# Patient Record
Sex: FEMALE | Race: WHITE | Employment: OTHER | URBAN - METROPOLITAN AREA
[De-identification: names, ages, dates, MRNs, and addresses within clinical notes are randomized per-mention and may not be internally consistent; named-entity substitution may affect disease eponyms.]

---

## 2017-01-26 ENCOUNTER — HOSPITAL ENCOUNTER (OUTPATIENT)
Dept: LAB | Age: 67
Discharge: HOME | End: 2017-01-26

## 2017-01-26 PROCEDURE — 88305 TISSUE EXAM BY PATHOLOGIST: CPT | Performed by: INTERNAL MEDICINE

## 2017-02-01 ENCOUNTER — HOSPITAL ENCOUNTER (OUTPATIENT)
Dept: INFUSION THERAPY | Age: 67
Discharge: HOME | End: 2017-02-01
Payer: MEDICARE

## 2017-02-01 VITALS
RESPIRATION RATE: 18 BRPM | TEMPERATURE: 98.2 F | DIASTOLIC BLOOD PRESSURE: 74 MMHG | OXYGEN SATURATION: 98 % | HEART RATE: 65 BPM | SYSTOLIC BLOOD PRESSURE: 130 MMHG

## 2017-02-01 LAB
CALCIUM SERPL-MCNC: 9.3 MG/DL (ref 8.3–10.4)
CREAT SERPL-MCNC: 1.16 MG/DL (ref 0.6–1)

## 2017-02-01 PROCEDURE — 36415 COLL VENOUS BLD VENIPUNCTURE: CPT | Performed by: PHYSICIAN ASSISTANT

## 2017-02-01 PROCEDURE — 82310 ASSAY OF CALCIUM: CPT | Performed by: PHYSICIAN ASSISTANT

## 2017-02-01 PROCEDURE — 96372 THER/PROPH/DIAG INJ SC/IM: CPT

## 2017-02-01 PROCEDURE — 74011250636 HC RX REV CODE- 250/636: Performed by: PHYSICIAN ASSISTANT

## 2017-02-01 PROCEDURE — 82565 ASSAY OF CREATININE: CPT | Performed by: PHYSICIAN ASSISTANT

## 2017-02-01 RX ADMIN — DENOSUMAB 60 MG: 60 INJECTION SUBCUTANEOUS at 16:51

## 2017-02-01 NOTE — PROGRESS NOTES
Arrived to the [facility]. Prolia completed. Patient tolerated without problems.  Patient had Prolia previously at a different facility  Any issues or concerns during appointment: no  Patient aware of next infusion appointment on to be scheduled after PCP appointment   Discharged ambulatory

## 2017-02-09 ENCOUNTER — HOSPITAL ENCOUNTER (OUTPATIENT)
Dept: SURGERY | Age: 67
Discharge: HOME | End: 2017-02-09

## 2017-02-09 VITALS
BODY MASS INDEX: 22.71 KG/M2 | OXYGEN SATURATION: 95 % | HEIGHT: 64 IN | SYSTOLIC BLOOD PRESSURE: 134 MMHG | RESPIRATION RATE: 16 BRPM | DIASTOLIC BLOOD PRESSURE: 58 MMHG | HEART RATE: 65 BPM | TEMPERATURE: 97.8 F | WEIGHT: 133 LBS

## 2017-02-09 RX ORDER — LEVOMEFOLATE/ALGAL OIL 15-90.314
1 CAPSULE ORAL
COMMUNITY
End: 2017-04-11 | Stop reason: SDUPTHER

## 2017-02-09 RX ORDER — CLONAZEPAM 1 MG/1
1 TABLET ORAL
COMMUNITY
End: 2017-04-11 | Stop reason: SDUPTHER

## 2017-02-09 RX ORDER — CHOLECALCIFEROL TAB 125 MCG (5000 UNIT) 125 MCG
5000 TAB ORAL
COMMUNITY

## 2017-02-09 RX ORDER — CLONAZEPAM 1 MG/1
1 TABLET ORAL AS NEEDED
COMMUNITY
End: 2017-04-11 | Stop reason: ALTCHOICE

## 2017-02-09 RX ORDER — DEXLANSOPRAZOLE 60 MG/1
60 CAPSULE, DELAYED RELEASE ORAL
COMMUNITY
End: 2017-04-11

## 2017-02-09 RX ORDER — GLUCOSAMINE/CHONDR SU A SOD 750-600 MG
1 TABLET ORAL
COMMUNITY

## 2017-02-09 RX ORDER — ASPIRIN 81 MG/1
81 TABLET ORAL
COMMUNITY

## 2017-02-09 RX ORDER — VILAZODONE HYDROCHLORIDE 40 MG/1
40 TABLET ORAL
COMMUNITY
End: 2017-04-11 | Stop reason: SDUPTHER

## 2017-02-09 NOTE — PERIOP NOTES
Patient verified name, , and surgery as listed in [facility]. TYPE  CASE:1B  Orders per surgeon: New Orders required  Labs per surgeon:none  Labs per anesthesia protocol: none   EKG  :  NA      Patient provided with handouts including guide to surgery , transfusions, pain management and hand hygiene for the family and community. Pt verbalizes understanding of all pre-op instructions . Instructed that family must be present in building at all times. Hibiclens and instructions given per hospital policy. Instructed patient to continue  previous medications as prescribed prior to surgery and  to take the following medications the day of surgery according to anesthesia guidelines : Zyrtec,Synthroid,Viibryd,Dexilant       Original medication prescription bottles where not visualized during patient appointment. Medication profile updated and reviewed with patient. Continue all previous medications unless otherwise directed. Instructed patient to hold  the following medications prior to surgery: Wilhelmina Brittle D      Patient verbalized understanding of all instructions and provided all medical/health information to the best of their ability.

## 2017-02-09 NOTE — PERIOP NOTES
Spoke with Yazmin Doll in scheduling, informed that pt states her upcoming surg is on her Left achilles.

## 2017-02-16 ENCOUNTER — HOSPITAL ENCOUNTER (OUTPATIENT)
Age: 67
Setting detail: OUTPATIENT SURGERY
Discharge: HOME | End: 2017-02-16
Attending: ORTHOPAEDIC SURGERY | Admitting: ORTHOPAEDIC SURGERY
Payer: MEDICARE

## 2017-02-16 ENCOUNTER — ANESTHESIA EVENT (OUTPATIENT)
Dept: SURGERY | Age: 67
End: 2017-02-16
Payer: MEDICARE

## 2017-02-16 ENCOUNTER — ANESTHESIA (OUTPATIENT)
Dept: SURGERY | Age: 67
End: 2017-02-16
Payer: MEDICARE

## 2017-02-16 VITALS
HEART RATE: 62 BPM | TEMPERATURE: 97.8 F | SYSTOLIC BLOOD PRESSURE: 116 MMHG | DIASTOLIC BLOOD PRESSURE: 56 MMHG | OXYGEN SATURATION: 100 % | RESPIRATION RATE: 16 BRPM

## 2017-02-16 PROCEDURE — 74011000250 HC RX REV CODE- 250

## 2017-02-16 PROCEDURE — 77030002982 HC SUT POLYSRB J&J -A: Performed by: ORTHOPAEDIC SURGERY

## 2017-02-16 PROCEDURE — 77030025281 HC SPLNT ORTHGLS 1 BSNM -B: Performed by: ORTHOPAEDIC SURGERY

## 2017-02-16 PROCEDURE — 77030020753 HC CUF TRNQT 1BLA STRY -B: Performed by: ORTHOPAEDIC SURGERY

## 2017-02-16 PROCEDURE — 74011250636 HC RX REV CODE- 250/636: Performed by: ANESTHESIOLOGY

## 2017-02-16 PROCEDURE — 76060000033 HC ANESTHESIA 1 TO 1.5 HR: Performed by: ORTHOPAEDIC SURGERY

## 2017-02-16 PROCEDURE — 76942 ECHO GUIDE FOR BIOPSY: CPT | Performed by: ORTHOPAEDIC SURGERY

## 2017-02-16 PROCEDURE — 74011250636 HC RX REV CODE- 250/636

## 2017-02-16 PROCEDURE — 77030003602 HC NDL NRV BLK BBMI -B: Performed by: ANESTHESIOLOGY

## 2017-02-16 PROCEDURE — 76010000160 HC OR TIME 0.5 TO 1 HR INTENSV-TIER 1: Performed by: ORTHOPAEDIC SURGERY

## 2017-02-16 PROCEDURE — 76010010054 HC POST OP PAIN BLOCK: Performed by: ORTHOPAEDIC SURGERY

## 2017-02-16 PROCEDURE — 77030002933 HC SUT MCRYL J&J -A: Performed by: ORTHOPAEDIC SURGERY

## 2017-02-16 PROCEDURE — 77030020754 HC CUF TRNQT 2BLA STRY -B: Performed by: ORTHOPAEDIC SURGERY

## 2017-02-16 PROCEDURE — 77030002916 HC SUT ETHLN J&J -A: Performed by: ORTHOPAEDIC SURGERY

## 2017-02-16 PROCEDURE — 76210000020 HC REC RM PH II FIRST 0.5 HR: Performed by: ORTHOPAEDIC SURGERY

## 2017-02-16 PROCEDURE — 77030013921: Performed by: ORTHOPAEDIC SURGERY

## 2017-02-16 PROCEDURE — 77030011640 HC PAD GRND REM COVD -A: Performed by: ORTHOPAEDIC SURGERY

## 2017-02-16 PROCEDURE — C1713 ANCHOR/SCREW BN/BN,TIS/BN: HCPCS | Performed by: ORTHOPAEDIC SURGERY

## 2017-02-16 PROCEDURE — 77030013789 HC KT REP BIO TEND ARTH -C: Performed by: ORTHOPAEDIC SURGERY

## 2017-02-16 PROCEDURE — 76210000063 HC OR PH I REC FIRST 0.5 HR: Performed by: ORTHOPAEDIC SURGERY

## 2017-02-16 PROCEDURE — 77030019940 HC BLNKT HYPOTHRM STRY -B: Performed by: ANESTHESIOLOGY

## 2017-02-16 PROCEDURE — 74011250636 HC RX REV CODE- 250/636: Performed by: ORTHOPAEDIC SURGERY

## 2017-02-16 DEVICE — SCREW INTFR L15MM DIA6.25MM BIOCOMPOSITE FACILITATE IORT: Type: IMPLANTABLE DEVICE | Site: ACHILLES TENDON | Status: FUNCTIONAL

## 2017-02-16 DEVICE — IMPLANT SYS BIOCOMP ACHILLES SUTRBRIDGE: Type: IMPLANTABLE DEVICE | Site: ACHILLES TENDON | Status: FUNCTIONAL

## 2017-02-16 RX ORDER — FENTANYL CITRATE 50 UG/ML
100 INJECTION, SOLUTION INTRAMUSCULAR; INTRAVENOUS ONCE
Status: DISCONTINUED | OUTPATIENT
Start: 2017-02-16 | End: 2017-02-16 | Stop reason: HOSPADM

## 2017-02-16 RX ORDER — LIDOCAINE HYDROCHLORIDE 10 MG/ML
0.1 INJECTION INFILTRATION; PERINEURAL AS NEEDED
Status: DISCONTINUED | OUTPATIENT
Start: 2017-02-16 | End: 2017-02-16 | Stop reason: HOSPADM

## 2017-02-16 RX ORDER — LIDOCAINE HYDROCHLORIDE 20 MG/ML
INJECTION, SOLUTION EPIDURAL; INFILTRATION; INTRACAUDAL; PERINEURAL AS NEEDED
Status: DISCONTINUED | OUTPATIENT
Start: 2017-02-16 | End: 2017-02-16 | Stop reason: HOSPADM

## 2017-02-16 RX ORDER — SODIUM CHLORIDE, SODIUM LACTATE, POTASSIUM CHLORIDE, CALCIUM CHLORIDE 600; 310; 30; 20 MG/100ML; MG/100ML; MG/100ML; MG/100ML
100 INJECTION, SOLUTION INTRAVENOUS CONTINUOUS
Status: CANCELLED | OUTPATIENT
Start: 2017-02-16

## 2017-02-16 RX ORDER — NALOXONE HYDROCHLORIDE 0.4 MG/ML
0.1 INJECTION, SOLUTION INTRAMUSCULAR; INTRAVENOUS; SUBCUTANEOUS AS NEEDED
Status: CANCELLED | OUTPATIENT
Start: 2017-02-16

## 2017-02-16 RX ORDER — CEFAZOLIN SODIUM IN 0.9 % NACL 2 G/50 ML
2 INTRAVENOUS SOLUTION, PIGGYBACK (ML) INTRAVENOUS ONCE
Status: COMPLETED | OUTPATIENT
Start: 2017-02-16 | End: 2017-02-16

## 2017-02-16 RX ORDER — BUPIVACAINE HYDROCHLORIDE AND EPINEPHRINE 5; 5 MG/ML; UG/ML
INJECTION, SOLUTION EPIDURAL; INTRACAUDAL; PERINEURAL AS NEEDED
Status: DISCONTINUED | OUTPATIENT
Start: 2017-02-16 | End: 2017-02-16 | Stop reason: HOSPADM

## 2017-02-16 RX ORDER — ONDANSETRON 2 MG/ML
4 INJECTION INTRAMUSCULAR; INTRAVENOUS ONCE
Status: CANCELLED | OUTPATIENT
Start: 2017-02-16 | End: 2017-02-16

## 2017-02-16 RX ORDER — ROPIVACAINE HYDROCHLORIDE 5 MG/ML
INJECTION, SOLUTION EPIDURAL; INFILTRATION; PERINEURAL AS NEEDED
Status: DISCONTINUED | OUTPATIENT
Start: 2017-02-16 | End: 2017-02-16 | Stop reason: HOSPADM

## 2017-02-16 RX ORDER — OXYCODONE HYDROCHLORIDE 5 MG/1
10 TABLET ORAL
Status: CANCELLED | OUTPATIENT
Start: 2017-02-16

## 2017-02-16 RX ORDER — SODIUM CHLORIDE, SODIUM LACTATE, POTASSIUM CHLORIDE, CALCIUM CHLORIDE 600; 310; 30; 20 MG/100ML; MG/100ML; MG/100ML; MG/100ML
100 INJECTION, SOLUTION INTRAVENOUS CONTINUOUS
Status: DISCONTINUED | OUTPATIENT
Start: 2017-02-16 | End: 2017-02-16 | Stop reason: HOSPADM

## 2017-02-16 RX ORDER — HYDROMORPHONE HYDROCHLORIDE 2 MG/ML
0.5 INJECTION, SOLUTION INTRAMUSCULAR; INTRAVENOUS; SUBCUTANEOUS
Status: CANCELLED | OUTPATIENT
Start: 2017-02-16

## 2017-02-16 RX ORDER — RANITIDINE HCL 75 MG
75 TABLET ORAL DAILY
COMMUNITY
End: 2017-04-11 | Stop reason: DRUGHIGH

## 2017-02-16 RX ORDER — ALBUTEROL SULFATE 0.83 MG/ML
2.5 SOLUTION RESPIRATORY (INHALATION) AS NEEDED
Status: CANCELLED | OUTPATIENT
Start: 2017-02-16

## 2017-02-16 RX ORDER — PROPOFOL 10 MG/ML
INJECTION, EMULSION INTRAVENOUS AS NEEDED
Status: DISCONTINUED | OUTPATIENT
Start: 2017-02-16 | End: 2017-02-16 | Stop reason: HOSPADM

## 2017-02-16 RX ORDER — PROPOFOL 10 MG/ML
INJECTION, EMULSION INTRAVENOUS
Status: DISCONTINUED | OUTPATIENT
Start: 2017-02-16 | End: 2017-02-16 | Stop reason: HOSPADM

## 2017-02-16 RX ORDER — MIDAZOLAM HYDROCHLORIDE 1 MG/ML
2 INJECTION, SOLUTION INTRAMUSCULAR; INTRAVENOUS ONCE
Status: COMPLETED | OUTPATIENT
Start: 2017-02-16 | End: 2017-02-16

## 2017-02-16 RX ORDER — DIPHENHYDRAMINE HYDROCHLORIDE 50 MG/ML
12.5 INJECTION, SOLUTION INTRAMUSCULAR; INTRAVENOUS
Status: CANCELLED | OUTPATIENT
Start: 2017-02-16

## 2017-02-16 RX ORDER — ONDANSETRON 2 MG/ML
INJECTION INTRAMUSCULAR; INTRAVENOUS AS NEEDED
Status: DISCONTINUED | OUTPATIENT
Start: 2017-02-16 | End: 2017-02-16 | Stop reason: HOSPADM

## 2017-02-16 RX ORDER — MIDAZOLAM HYDROCHLORIDE 1 MG/ML
2 INJECTION, SOLUTION INTRAMUSCULAR; INTRAVENOUS
Status: DISCONTINUED | OUTPATIENT
Start: 2017-02-16 | End: 2017-02-16 | Stop reason: HOSPADM

## 2017-02-16 RX ADMIN — PROPOFOL 120 MCG/KG/MIN: 10 INJECTION, EMULSION INTRAVENOUS at 13:16

## 2017-02-16 RX ADMIN — LIDOCAINE HYDROCHLORIDE 40 MG: 20 INJECTION, SOLUTION EPIDURAL; INFILTRATION; INTRACAUDAL; PERINEURAL at 13:16

## 2017-02-16 RX ADMIN — CEFAZOLIN 2 G: 1 INJECTION, POWDER, FOR SOLUTION INTRAMUSCULAR; INTRAVENOUS; PARENTERAL at 13:12

## 2017-02-16 RX ADMIN — ONDANSETRON 4 MG: 2 INJECTION INTRAMUSCULAR; INTRAVENOUS at 13:59

## 2017-02-16 RX ADMIN — MIDAZOLAM HYDROCHLORIDE 2 MG: 1 INJECTION, SOLUTION INTRAMUSCULAR; INTRAVENOUS at 11:00

## 2017-02-16 RX ADMIN — SODIUM CHLORIDE, SODIUM LACTATE, POTASSIUM CHLORIDE, AND CALCIUM CHLORIDE 100 ML/HR: 600; 310; 30; 20 INJECTION, SOLUTION INTRAVENOUS at 11:00

## 2017-02-16 RX ADMIN — ROPIVACAINE HYDROCHLORIDE 15 ML: 5 INJECTION, SOLUTION EPIDURAL; INFILTRATION; PERINEURAL at 11:16

## 2017-02-16 RX ADMIN — BUPIVACAINE HYDROCHLORIDE AND EPINEPHRINE 30 ML: 5; 5 INJECTION, SOLUTION EPIDURAL; INTRACAUDAL; PERINEURAL at 11:16

## 2017-02-16 RX ADMIN — PROPOFOL 20 MG: 10 INJECTION, EMULSION INTRAVENOUS at 13:16

## 2017-02-16 NOTE — IP AVS SNAPSHOT
Jean Claude Clonts 
 
 
 [address] [facility] 
[phone] Patient: Chuck Tineo MRN: [id number] You are allergic to the following Allergen Reactions Morphine Other (comments) \"I almost checked out\"-Trendelenburg Nitrofurantoin Diarrhea And Nausea Oxycodone-Acetaminophen Diarrhea Propranolol Other (comments) \"Didn't sleep for 2 days and  I saw green splotches on 2 different days \"  
    
 Sulfa (Sulfonamide Antibiotics) Other (comments) As a child/ unknown reactions Recent Documentation OB Status Smoking Status Hysterectomy Never Smoker Emergency Contacts Name Discharge Info Relation Home Work Mobile [address] CAREGIVER [3] Spouse [3] [phone] About your hospitalization You were admitted on:  February 16, 2017 [facility] You were discharged on:  February 16, 2017 Unit phone number:  [phone] Why you were hospitalized Your primary diagnosis was:  Not on File Providers Seen During Your Hospitalizations Provider Role Specialty Primary office phone Catie Barnes MD Attending Provider Orthopedic Surgery [phone] Your Primary Care Physician (PCP) Primary Care Physician Office Phone Office Fax Kareem Scott [phone] Follow-up Information None Your Appointments Monday March 27, 2017  4:10 PM EDT  
DEXA BONE DENSITY STDY AXIAL with SFE DEXA BI GE LUNAR DEXA [address] [facility] ([address] [place] [address]) [address] [address]  
[phone]  MEDICATIONS -  Patient must bring a complete list of all medications currently taking to include prescriptions, over-the-counter meds, herbals, vitamins & any dietary supplements -  Patient must discontinue use of calcium, vitamins, or calcium supplements including antacids (calcium based) 24 hours before scan. GENERAL INSTRUCTIONS - Men should wear a jogging suit with NO metal.  Women should wear a sports bra with NO metal as well as clothes with no metal or buttons. PATIENT ARRIVAL -  Please report 15 minutes early to check in  
  
    
 Monday March 27, 2017  4:45 PM EDT  
[facility] MAMMO SCREENING with SFE [facility] BI ROOM 1 [address] [place] [address] Breast Health ([facility] [address]) [address] [place] [address]  
[phone] ***** NOTE: Appointments for the Mobile Mammography UNIT CANNOT be made on My Chart *****  PATIENT ARRIVAL - Please report 30 minutes early to check in. GENERAL INSTRUCTIONS -  On the day of your exam do not use any bath powder, deodorant or lotions on the chest or armpit area. Wear two-piece outfit for ease of changing. Allow at least 1 hour for test.  
  
    
  
  
 
  
  
  
Current Discharge Medication List  
  
ASK your doctor about these medications Dose & Instructions Dispensing Information Comments Morning Noon Evening Bedtime  
 aspirin delayed-release 81 mg tablet Your next dose is: Today, Tomorrow Other:  _________ Dose:  81 mg Take 81 mg by mouth nightly. Refills:  0 Biotin 2,500 mcg Cap Your next dose is: Today, Tomorrow Other:  _________ Dose:  1 Tab Take 1 Tab by mouth as needed. Refills:  0  
     
   
   
   
  
 CALCIUM 600 + D 600-125 mg-unit Tab Generic drug:  calcium-cholecalciferol (d3) Your next dose is: Today, Tomorrow Other:  _________ Dose:  1 Tab Take 1 Tab by mouth two (2) times a day. Refills:  0 CENTRUM SILVER PO Your next dose is: Today, Tomorrow Other:  _________ Dose:  1 Tab Take 1 Tab by mouth every morning. Refills:  0  
     
   
   
   
  
 cholecalciferol (VITAMIN D3) 5,000 unit Tab tablet Commonly known as:  VITAMIN D3 Your next dose is: Today, Tomorrow Other:  _________ Dose:  5000 Units Take 5,000 Units by mouth every Monday, Wednesday, Friday. Refills:  0  
     
   
   
   
  
 clotrimazole-betamethasone topical cream  
Commonly known as:  Tucker Scriver Your next dose is: Today, Tomorrow Other:  _________ Apply to affected area bid Quantity:  15 g Refills:  0  
     
   
   
   
  
 denosumab 60 mg/mL injection Commonly known as:  Elaina Vishal Your next dose is: Today, Tomorrow Other:  _________ Dose:  60 mg  
1 mL by SubCUTAneous route every 6 months. Quantity:  1 Syringe Refills:  1 DEPLIN (ALGAL OIL) 15-90.314 mg Cap Generic drug:  levomefolate-algal oil Your next dose is: Today, Tomorrow Other:  _________ Dose:  1 Cap Take 1 Cap by mouth every morning. Refills:  0 DEXILANT 60 mg Cpdb Generic drug:  Dexlansoprazole Your next dose is: Today, Tomorrow Other:  _________ Dose:  60 mg Take 60 mg by mouth every morning. Refills:  0  
     
   
   
   
  
 * KlonoPIN 1 mg tablet Generic drug:  clonazePAM  
   
Your next dose is: Today, Tomorrow Other:  _________ Dose:  1 mg Take 1 mg by mouth as needed. Refills:  0  
     
   
   
   
  
 * KlonoPIN 1 mg tablet Generic drug:  clonazePAM  
   
Your next dose is: Today, Tomorrow Other:  _________ Dose:  1 mg Take 1 mg by mouth nightly. Refills:  0 KRILL OIL PO Your next dose is: Today, Tomorrow Other:  _________ Dose:  1 Tab Take 1 Tab by mouth daily (with lunch). Refills:  0  
     
   
   
   
  
 synthroid 50 mcg tablet Generic drug:  levothyroxine Your next dose is: Today, Tomorrow Other:  _________ Dose:  50 mcg Take 1 Tab by mouth Daily (before breakfast). Brand medically necessary. Quantity:  30 Tab Refills:  5 VIIBRYD 40 mg Tab tablet Generic drug:  vilazodone Your next dose is: Today, Tomorrow Other:  _________ Dose:  40 mg Take 40 mg by mouth every morning. Refills:  0  
     
   
   
   
  
 ZANTAC 75 75 mg tablet Generic drug:  raNITIdine Your next dose is: Today, Tomorrow Other:  _________ Dose:  75 mg Take 75 mg by mouth daily. Refills:  0 ZyrTEC 10 mg Cap Generic drug:  Cetirizine Your next dose is: Today, Tomorrow Other:  _________ Dose:  1 Cap Take 1 Cap by mouth every morning. Refills:  0  
     
   
   
   
  
 * Notice: This list has 2 medication(s) that are the same as other medications prescribed for you. Read the directions carefully, and ask your doctor or other care provider to review them with you. Discharge Instructions INSTRUCTIONS FOLLOWING FOOT SURGERY 
 
ACTIVITY Elevate foot. No Ice Get up and out of bed frequently. Move the legs as much as possible while in bed. No weight bearing. Use crutches or knee walker until seen in follow up appointment Take one 325mg aspirin daily if okay with your medical doctor and you have no GI ulcer. Get up and out of bed frequently. While in bed move the legs as much as possible) DIET Clear liquids until no nausea or vomiting; then light diet for the first day. Advance to regular diet on second day, unless your doctor orders otherwise. PAIN Take pain medications as directed by your doctor. Call your doctor if pain is NOT relieved by medication. DRESSING CARE Keep dry and in place until follow up appointment CALL YOUR DOCTOR IF YOU HAVE 
 Excessive bleeding that does not stop after holding mild pressure over the area. Temperature of 101 degrees or above. Redness, excessive swelling or bruising, and/or green or yellow, smelly discharge from incision. Loss of sensation - cold, white or blue toes. AFTER ANESTHESIA For the first 24 hours and while taking narcotics for pain: DO NOT Drive, Drink Alcoholic beverages, or make important Decisions. Be aware of dizziness following anesthesia and while taking pain medication. Preventing Infection at Home We care about preventing infection and avoiding the spread of germs  not only when you are in the hospital but also when you return home. When you return home from the hospital, its important to take the following steps to help prevent infection and avoid spreading germs that could infect you and others. Ask everyone in your home to follow these guidelines, too. Clean Your Hands · Clean your hands whenever your hands are visibly dirty, before you eat, before or after touching your mouth, nose or eyes, and before preparing food. Clean them after contact with body fluids, using the restroom, touching animals or changing diapers. · When washing hands, wet them with warm water and work up a lather. Rub hands for at least 15 seconds, then rinse them and pat them dry with a clean towel or paper towel. · When using hand sanitizers, it should take about 15 seconds to rub your hands dry. If not, you probably didnt apply enough . Cover Your Sneeze or Cough Germs are released into the air whenever you sneeze or cough. To prevent the spread of infection: · Turn away from other people before coughing or sneezing. · Cover your mouth or nose with a tissue when you cough or sneeze. Put the tissue in the trash. · If you dont have a tissue, cough or sneeze into your upper sleeve, not your hands. · Always clean your hands after coughing or sneezing. Care for Wounds Your skin is your bodys first line of defense against germs, but an open wound leaves an easy way for germs to enter your body. To prevent infection: · Clean your hands before and after changing wound dressings, and wear gloves to change dressings if recommended by your doctor. · Take special care with IV lines or other devices inserted into the body. If you must touch them, clean your hands first. 
· Follow any specific instructions from your doctor to care for your wounds. Contact your doctor if you experience any signs of infection, such as fever or increased redness at the surgical or wound site. Keep a [address] [address] · Clean or wipe commonly touched hard surfaces like door handles, sinks, tabletops, phones and TV remotes. · Use products labeled disinfectant to kill harmful bacteria and viruses. · Use a clean cloth or paper towel to clean and dry surfaces. Wiping surfaces with a dirty dishcloth, sponge or towel will only spread germs. · Never share toothbrushes, stern, drinking glasses, utensils, razor blades, face cloths or bath towels to avoid spreading germs. · Be sure that the linens that you sleep on are clean. · Keep pets away from wounds and wash your hands after touching pets, their toys or bedding. We care about you and your health. Remember, preventing infections is a team effort between you, your family, friends and health care providers. APPOINTMENT DATE/TIME Keep as scheduled YOUR DOCTOR'S PHONE NUMBER [phone] DISCHARGE SUMMARY from Nurse PATIENT INSTRUCTIONS: 
 
After general anesthesia or intravenous sedation, for 24 hours or while taking prescription Narcotics: · Limit your activities · Do not drive and operate hazardous machinery · Do not make important personal or business decisions · Do  not drink alcoholic beverages · If you have not urinated within 8 hours after discharge, please contact your surgeon on call. *  Please give a list of your current medications to your Primary Care Provider. *  Please update this list whenever your medications are discontinued, doses are 
    changed, or new medications (including over-the-counter products) are added. *  Please carry medication information at all times in case of emergency situations. These are general instructions for a healthy lifestyle: No smoking/ No tobacco products/ Avoid exposure to second hand smoke Surgeon General's Warning:  Quitting smoking now greatly reduces serious risk to your health. Obesity, smoking, and sedentary lifestyle greatly increases your risk for illness A healthy diet, regular physical exercise & weight monitoring are important for maintaining a healthy lifestyle You may be retaining fluid if you have a history of heart failure or if you experience any of the following symptoms:  Weight gain of 3 pounds or more overnight or 5 pounds in a week, increased swelling in our hands or feet or shortness of breath while lying flat in bed. Please call your doctor as soon as you notice any of these symptoms; do not wait until your next office visit. Recognize signs and symptoms of STROKE: 
 
F-face looks uneven A-arms unable to move or move unevenly S-speech slurred or non-existent T-time-call 911 as soon as signs and symptoms begin-DO NOT go Back to bed or wait to see if you get better-TIME IS BRAIN. Discharge Orders None Introducing [facility]! Yanira Mcgowan introduces [employer] patient portal. Now you can access parts of your medical record, email your doctor's office, and request medication refills online. 1. In your internet browser, go to [email or web address] 2. Click on the First Time User? Click Here link in the Sign In box. You will see the New Member Sign Up page. 3. Enter your [employer] Access Code exactly as it appears below.  You will not need to use this code after youve completed the sign-up process. If you do not sign up before the expiration date, you must request a new code. · [employer] Access Code: [email or web address] Expires: 5/7/2017 10:16 AM 
 
4. Enter the last four digits of your Social Security Number (xxxx) and Date of Birth (mm/dd/yyyy) as indicated and click Submit. You will be taken to the next sign-up page. 5. Create a [employer] ID. This will be your [employer] login ID and cannot be changed, so think of one that is secure and easy to remember. 6. Create a [employer] password. You can change your password at any time. 7. Enter your Password Reset Question and Answer. This can be used at a later time if you forget your password. 8. Enter your e-mail address. You will receive e-mail notification when new information is available in [address] 9. Click Sign Up. You can now view and download portions of your medical record. 10. Click the Download Summary menu link to download a portable copy of your medical information. If you have questions, please visit the Frequently Asked Questions section of the [employer] website. Remember, [employer] is NOT to be used for urgent needs. For medical emergencies, dial 911. Now available from your iPhone and Android! General Information Please provide this summary of care documentation to your next provider. Patient Signature:  ____________________________________________________________ Date:  ____________________________________________________________  
  
Samson Chin Provider Signature:  ____________________________________________________________ Date:  ____________________________________________________________

## 2017-02-16 NOTE — ANESTHESIA POSTPROCEDURE EVALUATION
Post-Anesthesia Evaluation and Assessment    Patient: Mich Raya MRN: [id number]  SSN: [id number]    YOB: 1950  Age: 69 y.o. Sex: female       Cardiovascular Function/Vital Signs  Visit Vitals    /49    Pulse 87    Temp 36.6 °C (97.8 °F)    Resp 12    SpO2 97%       Patient is status post total IV anesthesia anesthesia for Procedure(s):  LEFT  ACHILLES TENDON REPAIR /  FHL TRANSFER / Lavanda People / GASTROC RECESSION . Nausea/Vomiting: None    Postoperative hydration reviewed and adequate. Pain:  Pain Scale 1: (P) Numeric (0 - 10) (02/16/17 1412)  Pain Intensity 1: (P) 0 (02/16/17 1412)   Managed    Neurological Status:   Neuro (WDL): (P) Exceptions to WDL (02/16/17 1412)  Neuro  Neurologic State: (P) Drowsy (02/16/17 1412)  LUE Motor Response: (P) Purposeful (02/16/17 1412)  LLE Motor Response: (P) Pharmacologically paralyzed;Numbness (02/16/17 1412)  RUE Motor Response: (P) Purposeful (02/16/17 1412)  RLE Motor Response: (P) Purposeful (02/16/17 1412)   At baseline    Mental Status and Level of Consciousness: Arousable    Pulmonary Status:   O2 Device: Nasal cannula (02/16/17 1412)   Adequate oxygenation and airway patent    Complications related to anesthesia: None    Post-anesthesia assessment completed.  No concerns    Signed By: Raymundo Gunderson MD     February 16, 2017

## 2017-02-16 NOTE — DISCHARGE INSTRUCTIONS
INSTRUCTIONS FOLLOWING FOOT SURGERY    ACTIVITY  Elevate foot. No Ice  Get up and out of bed frequently. Move the legs as much as possible while in bed. No weight bearing. Use crutches or knee walker until seen in follow up appointment  Take one 325mg aspirin daily if okay with your medical doctor and you have no GI ulcer. Get up and out of bed frequently. While in bed move the legs as much as possible)    DIET  Clear liquids until no nausea or vomiting; then light diet for the first day. Advance to regular diet on second day, unless your doctor orders otherwise. PAIN  Take pain medications as directed by your doctor. Call your doctor if pain is NOT relieved by medication. DRESSING CARE Keep dry and in place until follow up appointment    CALL YOUR DOCTOR IF YOU HAVE  Excessive bleeding that does not stop after holding mild pressure over the area. Temperature of 101 degrees or above. Redness, excessive swelling or bruising, and/or green or yellow, smelly discharge from incision. Loss of sensation - cold, white or blue toes. AFTER ANESTHESIA  For the first 24 hours and while taking narcotics for pain: DO NOT Drive, Drink Alcoholic beverages, or make important Decisions. Be aware of dizziness following anesthesia and while taking pain medication. Preventing Infection at Home  We care about preventing infection and avoiding the spread of germs - not only when you are in the hospital but also when you return home. When you return home from the hospital, its important to take the following steps to help prevent infection and avoid spreading germs that could infect you and others. Ask everyone in your home to follow these guidelines, too. Clean Your Hands  · Clean your hands whenever your hands are visibly dirty, before you eat, before or after touching your mouth, nose or eyes, and before preparing food.  Clean them after contact with body fluids, using the restroom, touching animals or changing diapers. · When washing hands, wet them with warm water and work up a lather. Rub hands for at least 15 seconds, then rinse them and pat them dry with a clean towel or paper towel. · When using hand sanitizers, it should take about 15 seconds to rub your hands dry. If not, you probably didnt apply enough . Cover Your Sneeze or Cough  Germs are released into the air whenever you sneeze or cough. To prevent the spread of infection:  · Turn away from other people before coughing or sneezing. · Cover your mouth or nose with a tissue when you cough or sneeze. Put the tissue in the trash. · If you dont have a tissue, cough or sneeze into your upper sleeve, not your hands. · Always clean your hands after coughing or sneezing. Care for Wounds  Your skin is your bodys first line of defense against germs, but an open wound leaves an easy way for germs to enter your body. To prevent infection:  · Clean your hands before and after changing wound dressings, and wear gloves to change dressings if recommended by your doctor. · Take special care with IV lines or other devices inserted into the body. If you must touch them, clean your hands first.  · Follow any specific instructions from your doctor to care for your wounds. Contact your doctor if you experience any signs of infection, such as fever or increased redness at the surgical or wound site. Keep a Clean Home  · Clean or wipe commonly touched hard surfaces like door handles, sinks, tabletops, phones and TV remotes. · Use products labeled disinfectant to kill harmful bacteria and viruses. · Use a clean cloth or paper towel to clean and dry surfaces. Wiping surfaces with a dirty dishcloth, sponge or towel will only spread germs. · Never share toothbrushes, stern, drinking glasses, utensils, razor blades, face cloths or bath towels to avoid spreading germs. · Be sure that the linens that you sleep on are clean.   · Keep pets away from wounds and wash your hands after touching pets, their toys or bedding. We care about you and your health. Remember, preventing infections is a team effort between you, your family, friends and health care providers. APPOINTMENT DATE/TIME Keep as scheduled    YOUR DOCTOR'S PHONE NUMBER [phone]      DISCHARGE SUMMARY from Nurse    PATIENT INSTRUCTIONS:    After general anesthesia or intravenous sedation, for 24 hours or while taking prescription Narcotics:  · Limit your activities  · Do not drive and operate hazardous machinery  · Do not make important personal or business decisions  · Do  not drink alcoholic beverages  · If you have not urinated within 8 hours after discharge, please contact your surgeon on call. *  Please give a list of your current medications to your Primary Care Provider. *  Please update this list whenever your medications are discontinued, doses are      changed, or new medications (including over-the-counter products) are added. *  Please carry medication information at all times in case of emergency situations. These are general instructions for a healthy lifestyle:    No smoking/ No tobacco products/ Avoid exposure to second hand smoke    Surgeon General's Warning:  Quitting smoking now greatly reduces serious risk to your health. Obesity, smoking, and sedentary lifestyle greatly increases your risk for illness    A healthy diet, regular physical exercise & weight monitoring are important for maintaining a healthy lifestyle    You may be retaining fluid if you have a history of heart failure or if you experience any of the following symptoms:  Weight gain of 3 pounds or more overnight or 5 pounds in a week, increased swelling in our hands or feet or shortness of breath while lying flat in bed. Please call your doctor as soon as you notice any of these symptoms; do not wait until your next office visit.     Recognize signs and symptoms of STROKE:    F-face looks uneven    A-arms unable to move or move unevenly    S-speech slurred or non-existent    T-time-call 911 as soon as signs and symptoms begin-DO NOT go       Back to bed or wait to see if you get better-TIME IS BRAIN.

## 2017-02-16 NOTE — ANESTHESIA PROCEDURE NOTES
Peripheral Block    Start time: 2/16/2017 11:15 AM  End time: 2/16/2017 11:16 AM  Performed by: Deejay Mcclendon  Authorized by: Deejay Mcclendon       Pre-procedure: Indications: at surgeon's request and post-op pain management    Preanesthetic Checklist: patient identified, risks and benefits discussed, site marked, timeout performed, anesthesia consent given and patient being monitored    Timeout Time: 11:15          Block Type:   Block Type:   Adductor canal  Laterality:  Left  Monitoring:  Standard ASA monitoring, continuous pulse ox, frequent vital sign checks, heart rate, oxygen and responsive to questions  Injection Technique:  Single shot  Procedures: ultrasound guided    Patient Position: supine  Prep: chlorhexidine    Location:  Mid thigh  Needle Type:  Stimuplex  Needle Gauge:  21 G  Needle Localization:  Ultrasound guidance and anatomical landmarks  Medication Injected:  0.5%  ropivacaine  Volume (mL):  15    Assessment:  Number of attempts:  1  Injection Assessment:  Incremental injection every 5 mL, local visualized surrounding nerve on ultrasound, negative aspiration for blood, no paresthesia, no intravascular symptoms and ultrasound image on chart  Patient tolerance:  Patient tolerated the procedure well with no immediate complications

## 2017-02-16 NOTE — ANESTHESIA PROCEDURE NOTES
Peripheral Block    Start time: 2/16/2017 11:12 AM  End time: 2/16/2017 11:15 AM  Performed by: Cammie Davila  Authorized by: Cammie Davila       Pre-procedure:    Indications: at surgeon's request and post-op pain management    Preanesthetic Checklist: patient identified, risks and benefits discussed, site marked, timeout performed, anesthesia consent given and patient being monitored    Timeout Time: 11:12          Block Type:   Block Type:  Popliteal  Laterality:  Left  Monitoring:  Standard ASA monitoring, continuous pulse ox, frequent vital sign checks, heart rate, oxygen and responsive to questions  Injection Technique:  Single shot  Procedures: ultrasound guided    Prep: chlorhexidine    Needle Type:  Stimuplex  Needle Gauge:  21 G  Needle Localization:  Ultrasound guidance and anatomical landmarks  Medication Injected:  0.5%  bupivacaine  Adds:  Epi 1:200K  Volume (mL):  30    Assessment:  Number of attempts:  1  Injection Assessment:  Incremental injection every 5 mL, local visualized surrounding nerve on ultrasound, negative aspiration for blood, no paresthesia, no intravascular symptoms and ultrasound image on chart  Patient tolerance:  Patient tolerated the procedure well with no immediate complications

## 2017-02-16 NOTE — BRIEF OP NOTE
BRIEF OPERATIVE NOTE    Date of Procedure: 2/16/2017   Preoperative Diagnosis: Achilles bursitis of left lower extremity [M76.62]  Contracture of ankle, left [M24.572]  Postoperative Diagnosis: Achilles bursitis of left lower extremity [M76.62]  Contracture of ankle, left [M24.572]    Procedure(s):  LEFT  ACHILLES TENDON REPAIR /  FHL TRANSFER / Joshua El / GASTROC RECESSION   Surgeon(s) and Role:     * Alyssa Story MD - Primary            Surgical Staff:  Circ-1: Dottie Stewart RN  Scrub Tech-1: Zaina He  Scrub Tech-2: Klarissa Leonardo  Event Time In   Incision Start 1331   Incision Close 1400     Anesthesia: General   Estimated Blood Loss: min  Specimens: * No specimens in log *   Findings: no  Complications: no  Implants:   Implant Name Type Inv.  Item Serial No.  Lot No. LRB No. Used Action   ANCHOR SUT ACHILLES SUTBRIDGE -- AR[employer]RE[employer] - [id number]  ANCHOR SUT ACHILLES SUTBRIDGE -- Lenoria Latin [id number] Left 1 Implanted   SCR INTFR BIOTENDSIS [id number] --  - [id number]   SCR INTFR BIOTENDSIS [id number] --    ARTHREX [id number] 1 Implanted

## 2017-02-16 NOTE — ANESTHESIA PREPROCEDURE EVALUATION
Anesthetic History     PONV          Review of Systems / Medical History  Patient summary reviewed, nursing notes reviewed and pertinent labs reviewed    Pulmonary  Within defined limits                 Neuro/Psych   Within defined limits           Cardiovascular                  Exercise tolerance: >4 METS     GI/Hepatic/Renal     GERD: well controlled           Endo/Other      Hypothyroidism: well controlled       Other Findings              Physical Exam    Airway  Mallampati: II  TM Distance: 4 - 6 cm  Neck ROM: normal range of motion   Mouth opening: Normal     Cardiovascular    Rhythm: regular  Rate: normal         Dental  No notable dental hx       Pulmonary  Breath sounds clear to auscultation               Abdominal         Other Findings            Anesthetic Plan    ASA: 2  Anesthesia type: total IV anesthesia      Post-op pain plan if not by surgeon: peripheral nerve block single    Induction: Intravenous  Anesthetic plan and risks discussed with: Patient and Spouse

## 2017-02-17 NOTE — OP NOTES
[facility]   OPERATIVE REPORT       Name:  Jazz Nash   MR#:  [id number]   :  1950   Account #:  [de-identified]   Date of Adm:  2017       DATE OF PROCEDURE: 2017     PREOPERATIVE DIAGNOSIS: Left insertional Achilles tendinitis. POSTOPERATIVE DIAGNOSIS: Left insertional Achilles tendinitis. PROCEDURE PERFORMED   1. Left secondary repair of Achilles with debridement. 2. Left flexor hallucis longus tendon transfer. 3. Left gastrocnemius recession. 4. Left Kandice deformity excision of calcaneus. SURGEON: Lily Rivas MD    ANESTHESIA: Popliteal block with monitored anesthesia care. ESTIMATED BLOOD LOSS: Minimal.    TOURNIQUET TIME: Twenty-eight minutes at 250 mmHg. ANTIBIOTIC PROPHYLAXIS: Ancef given prior to procedure. INDICATIONS FOR PROCEDURE: Ms. Roly Bustamante is a 49-year-old, white   female with symptomatic left insertional Achilles tendinitis who   has failed conservative therapy and desires surgical treatment. Risks and benefits of procedure including but not limited to   risk of anesthetic complications including myocardial   infarction, stroke, and death, as well as surgical complications   including damage to nerves and blood vessels, risk of infection,   incomplete pain relief, need for additional surgery have been   discussed at length with the patient. She understands the risks   and wishes to proceed with surgery at this time. DETAILS OF PROCEDURE: The patient's operative site was marked   with indelible ink in the preoperative holding area. A block was   placed by the Department of Anesthesia. The patient was brought   to the operating table and placed prone on well-padded chest   rolls. During a preop surgical timeout, the left lower extremity   was identified as the correct surgical site, prepped and draped   in a standard sterile fashion using ChloraPrep solution.  A   gastrocnemius recession was performed through a posterior approach to the Achilles, taking care to protect the sural   nerve, and then repaired using Monocryl and nylon sutures. A   posterior approach to the heel was then performed at that time. A central splitting approach to the Achilles was performed. Approximately 50% to 60% of the distal Achilles was debrided due   to tendinosis. Kandice deformity of the calcaneus was excised   using an osteotome and a power rasp. The deep fascia of the leg   was then incised. The FHL tendon was identified in the fiber   osseous tunnel and harvested, taking care to protect the   neurovascular bundles, brought down a drill hole in the   calcaneus and secured using an interference screw. An Arthrex   SutureBridge was used to reattach the Achilles to the calcaneus   without difficulty. The wound was then irrigated and closed   using Vicryl in the tendon, followed by Monocryl and nylon   sutures on the skin. A sterile dressing was then applied,   followed by well-padded posterior splint. Anesthesia was   discontinued. The patient was subsequently transferred back to   recovery room bed, taken to the recovery room in satisfactory   condition. She appeared to tolerate the procedure well. There   were no apparent surgical or anesthetic complications. All   needle and sponge counts were correct.         MD WINNIE Copeland Caro / ALIA   D:  02/16/2017   19:03   T:  02/17/2017   09:58   Job #:  [id number]

## 2017-03-27 ENCOUNTER — HOSPITAL ENCOUNTER (OUTPATIENT)
Dept: MAMMOGRAPHY | Age: 67
Discharge: HOME | End: 2017-03-27
Attending: PHYSICIAN ASSISTANT
Payer: MEDICARE

## 2017-03-27 DIAGNOSIS — M89.9 BONE DISORDER: ICD-10-CM

## 2017-03-27 DIAGNOSIS — Z12.31 ENCOUNTER FOR SCREENING MAMMOGRAM FOR BREAST CANCER: ICD-10-CM

## 2017-03-27 DIAGNOSIS — M85.80 OSTEOPENIA: ICD-10-CM

## 2017-03-27 PROCEDURE — 77080 DXA BONE DENSITY AXIAL: CPT

## 2017-03-27 PROCEDURE — 77067 SCR MAMMO BI INCL CAD: CPT

## 2017-03-28 NOTE — PROGRESS NOTES
Bone density results show persistent osteopenia but improving bone density in all areas so Prolia appears to be doing well for her. Continue these injections every 6 months and plan to repeat this test in 2 years.

## 2017-03-29 ENCOUNTER — HOSPITAL ENCOUNTER (OUTPATIENT)
Dept: PHYSICAL THERAPY | Age: 67
Discharge: HOME | End: 2017-03-29
Payer: MEDICARE

## 2017-03-29 PROCEDURE — 97162 PT EVAL MOD COMPLEX 30 MIN: CPT

## 2017-03-29 PROCEDURE — G8979 MOBILITY GOAL STATUS: HCPCS

## 2017-03-29 PROCEDURE — G8978 MOBILITY CURRENT STATUS: HCPCS

## 2017-03-29 PROCEDURE — 97110 THERAPEUTIC EXERCISES: CPT

## 2017-03-29 NOTE — PROGRESS NOTES
Ambulatory/Rehab Services H2 Model Falls Risk Assessment    Risk Factor Pts. ·   Confusion/Disorientation/Impulsivity  []    4 ·   Symptomatic Depression  []   2 ·   Altered Elimination  []   1 ·   Dizziness/Vertigo  []   1 ·   Gender (Male)  []   1 ·   Any administered antiepileptics (anticonvulsants):  []   2 ·   Any administered benzodiazepines:  []   1 ·   Visual Impairment (specify):  []   1 ·   Portable Oxygen Use  []   1 ·   Orthostatic ? BP  []   1 ·   History of Recent Falls (within 3 mos.)  []   5     Ability to Rise from Chair (choose one) Pts. ·   Ability to rise in a single movement  []   0 ·   Pushes up, successful in one attempt  [x]   1 ·   Multiple attempts, but successful  []   3 ·   Unable to rise without assistance  []   4   Total: (5 or greater = High Risk) 1     Falls Prevention Plan:   []                Physical Limitations to Exercise (specify):   []                Mobility Assistance Device (type):   []                Exercise/Equipment Adaptation (specify):    ©2010 [facility] [address] [place] [address] Patent #[id number].  Federal Law prohibits the replication, distribution or use without written permission from [facility] [employer]

## 2017-03-29 NOTE — PROGRESS NOTES
Timru Hodgson  : 1950 [facility]  [address], [address] [address],[address], [address] [facility]  [phone]   [phone]            OUTPATIENT PHYSICAL THERAPY:Initial Assessment 3/29/2017    ICD-10: Treatment Diagnosis: Difficulty in walking, not elsewhere classified (R26.2)  Precautions/Allergies:   Morphine; Nitrofurantoin; Oxycodone-acetaminophen; Propranolol; and Sulfa (sulfonamide antibiotics)   Fall Risk Score: 1 (? 5 = High Risk)  MD Orders: eval and treat MEDICAL/REFERRING DIAGNOSIS:  Achilles tendinitis, left leg [M76.62]  Contracture, left ankle [M24.572]   DATE OF ONSET: 3-16-17  REFERRING PHYSICIAN: Herve Ledesma MD  RETURN PHYSICIAN APPOINTMENT: unsure date     INITIAL ASSESSMENT:  Ms. Jacqui Navarrete presents s/p LEFT ACHILLES TENDON REPAIR / Vincenzo Tinsley / Kirsten Haines / Sunny Rondon . she will benefit from skilled PT to assist with pain control and ease of function. PROBLEM LIST (Impacting functional limitations):  1. Decreased Strength  2. Decreased ADL/Functional Activities  3. Decreased Balance  4. Increased Pain  5. Decreased Flexibility/Joint Mobility  6. Decreased Dickenson with Home Exercise Program INTERVENTIONS PLANNED:  1. Cold  2. Cryotherapy  3. Electrical Stimulation  4. Gait Training  5. Heat  6. Home Exercise Program (HEP)  7. Manual Therapy  8. Range of Motion (ROM)  9. Therapeutic Activites  10. Therapeutic Exercise/Strengthening   TREATMENT PLAN:  Effective Dates: 3-29-17 TO 17. Frequency/Duration: 2 times a week for 8 weeks  GOALS: (Goals have been discussed and agreed upon with patient.)  Short-Term Functional Goals: Time Frame: 4 weeks  1. Pt will be I with phase appropriate HEP to assist with ROM and strength foot. 2. Pt will improve dorsiflexion to 15 degrees to improve length of Achilles involved foot. Discharge Goals: Time Frame: 8 weeks  1.  Pt will improve strength involved foot to demonstrate 10 single leg heel raises in standing. 2. Pt will ambulate with non-antalgic gait pattern. 3. Pt will report ability to walk as long as desired without increased foot pain. Rehabilitation Potential For Stated Goals: Good  Regarding Hollis Araujo's therapy, I certify that the treatment plan above will be carried out by a therapist or under their direction. Thank you for this referral,  Wes Roman, PT     Referring Physician Signature: [place], MD              Date                    The information in this section was collected on 3/29/17 (except where otherwise noted). HISTORY:   History of Present Injury/Illness (Reason for Referral):  LEFT ACHILLES TENDON REPAIR / FHL TRANSFER / Viky Walsh / Patricia Brar. Pt reports this was all from bone spur. She was just able to get out of the boot on 3/28/17 and is walking with the aid of a single crutch. Past Medical History/Comorbidities:   Ms. Mary Ellen Sadler  has a past medical history of Acoustic neuroma ([facility] [address] (2011); Basal cell carcinoma; Benign essential tremor; Depression; Environmental allergies; Epiploic appendagitis (Dec 2014); Exposure to TB; Frequent UTI; GERD (gastroesophageal reflux disease); Hiatal hernia (01/26/2017); Hypothyroidism (acquired); Insomnia; Internal hemorrhoids (01/26/2017); Mixed hyperlipidemia; Neurogenic bladder; Osteopenia (2012); PONV (postoperative nausea and vomiting); Psoriasis; Situational anxiety; Symptomatic menopausal or female climacteric states; and Vitamin D deficiency. She also has no past medical history of Adverse effect of anesthesia; Difficult intubation; Malignant hyperthermia due to anesthesia; or Pseudocholinesterase deficiency. Ms. Mary Ellen Sadler  has a past surgical history that includes colonoscopy (4/2005 & 1/26/17); malignant skin lesion excision (10/2010); orthopaedic (Right, 8/27/15); orthopaedic (Right, 9/8/15); endoscopy (01/26/2017); and holly and bso (1994).   Social History/Living Environment:     lives in private residence  Prior Level of Function/Work/Activity:  I all activities. Like to walk and bike outdoors. Personal Factors:          Sex:  female        Age:  69 y.o. Current Medications:       Current Outpatient Prescriptions:     raNITIdine (ZANTAC 75) 75 mg tablet, Take 75 mg by mouth daily. , Disp: , Rfl:     Biotin 2,500 mcg cap, Take 1 Tab by mouth as needed. , Disp: , Rfl:     calcium-cholecalciferol, d3, (CALCIUM 600 + D) 600-125 mg-unit tab, Take 1 Tab by mouth two (2) times a day., Disp: , Rfl:     cholecalciferol, VITAMIN D3, (VITAMIN D3) 5,000 unit tab tablet, Take 5,000 Units by mouth every Monday, Wednesday, Friday., Disp: , Rfl:     Cetirizine (ZYRTEC) 10 mg cap, Take 1 Cap by mouth every morning., Disp: , Rfl:     clonazePAM (KLONOPIN) 1 mg tablet, Take 1 mg by mouth as needed. , Disp: , Rfl:     clonazePAM (KLONOPIN) 1 mg tablet, Take 1 mg by mouth nightly., Disp: , Rfl:     aspirin delayed-release 81 mg tablet, Take 81 mg by mouth nightly., Disp: , Rfl:     vilazodone (VIIBRYD) 40 mg tab tablet, Take 40 mg by mouth every morning., Disp: , Rfl:     levomefolate-algal oil (DEPLIN, ALGAL OIL,) 15-90.314 mg cap, Take 1 Cap by mouth every morning., Disp: , Rfl:     Dexlansoprazole (DEXILANT) 60 mg CpDB, Take 60 mg by mouth every morning., Disp: , Rfl:     FOLIC ACID/MULTIVIT-MIN/LUTEIN (CENTRUM SILVER PO), Take 1 Tab by mouth every morning., Disp: , Rfl:     KRILL OIL PO, Take 1 Tab by mouth daily (with lunch). , Disp: , Rfl:     SYNTHROID 50 mcg tablet, Take 1 Tab by mouth Daily (before breakfast). Brand medically necessary. , Disp: 30 Tab, Rfl: 5    clotrimazole-betamethasone (LOTRISONE) topical cream, Apply to affected area bid, Disp: 15 g, Rfl: 0    Denosumab (PROLIA) 60 mg/mL injection, 1 mL by SubCUTAneous route every 6 months. (Patient taking differently: 60 mg by SubCUTAneous route every 6 months.  Last injection 1/2017), Disp: 1 Syringe, Rfl: 1   Date Last Reviewed: 3/29/2017    Number of Personal Factors/Comorbidities that affect the Plan of Care: 1-2: MODERATE COMPLEXITY   EXAMINATION:   Observation/Orthostatic Postural Assessment:          notable swelling through L foot and ankle. ROM:  With knee bent         R foot:DF 15, PF 60, inv 45, eversion 20        L foot: DF 5, PF 30, inv 45, eversion 15  Strength:          R foot: 5/5 mid range throughout        L foot 4- to 4/5 throughout mid range  Functional Mobility:         Gait/Ambulation:  Use if single crutch, antalgic gait pattern        Stairs:  Not tested today  Balance:          Single leg balance. Unable test L today   Body Structures Involved:  1. Bones  2. Joints  3. Muscles  4. Ligaments Body Functions Affected:  1. Neuromusculoskeletal Activities and Participation Affected:  1. Learning and Applying Knowledge  2. Communication  3. Mobility  4. Self Care  5. Community, Social and [place]   Number of elements (examined above) that affect the Plan of Care: 3: MODERATE COMPLEXITY   CLINICAL PRESENTATION:   Presentation: Evolving clinical presentation with changing clinical characteristics: MODERATE COMPLEXITY   CLINICAL DECISION MAKING:   Outcome Measure: Tool Used: PT/OT FOOT AND ANKLE ABILITY MEASURE  Score:  Initial: 38 Most Recent: X (Date: -- )   Interpretation of Score: For the \"Activities of Daily Living\", there are 21 questions each scored on a 5 point scale with 0 representing \"Unable to do\" and 4 representing \"No difficulty\". The lower the score, the greater the functional disability. 84/84 represents no disability. Minimal detectable change is 5.7 points. With the addition of the 8 questions in the \"Sports Subscale,\" there are 29 questions, each scored on a 5 point scale with 0 representing \"Unable to do\" and 4 representing \"No difficulty\". The lower the score, the greater the functional disability. 116/116 represents no disability. Minimal detectable change is 12.3 points.     Activities of Daily Living:  Score 84 83-68 67-51 50-34 33-18 17-1 0   Modifier CH CI CJ CK CL CM CN     Activities of Daily Living + Sports Subscale:  Score 116 115-94 93-71 70-47 46-24 23-1 0   Modifier CH CI CJ CK CL CM CN     ? Mobility - Walking and Moving Around:     - CURRENT STATUS: CK - 40%-59% impaired, limited or restricted    - GOAL STATUS: CI - 1%-19% impaired, limited or restricted    - D/C STATUS:  ---------------To be determined---------------    Medical Necessity:   · Patient is expected to demonstrate progress in strength, range of motion and balance to increase ease of functional mobility. Reason for Services/Other Comments:  · Patient continues to require skilled intervention due to achillies tendonitis. Use of outcome tool(s) and clinical judgement create a POC that gives a: Questionable prediction of patient's progress: MODERATE COMPLEXITY            TREATMENT:   (In addition to Assessment/Re-Assessment sessions the following treatments were rendered)  Pre-treatment Symptoms/Complaints:  5/10 L foot pain and stiffness  Pain: Initial:     5/10 L foot Post Session:  5/10 L foot     THERAPEUTIC EXERCISE: (15 minutes):  Exercises per grid below to improve mobility, strength and balance. Required minimal verbal and tactile cues to promote proper body alignment. Progressed resistance, range and repetitions as indicated. Date:  3-29-17 Date:   Date:     Activity/Exercise Parameters Parameters Parameters   alphabet x1     Towel scrunch x10     Seated dorsiflexion/plantarflexion x20                                  Treatment/Session Assessment:    · Response to Treatment:  Tolerated well. Verbalizes understanding HEP. · Compliance with Program/Exercises: Will assess as treatment progresses. · Recommendations/Intent for next treatment session: \"Next visit will focus on advancements to more challenging activities\".   Total Treatment Duration:  PT Patient Time In/Time Out  Time In: 1030  Time Out: [address] [place]

## 2017-04-04 ENCOUNTER — HOSPITAL ENCOUNTER (OUTPATIENT)
Dept: PHYSICAL THERAPY | Age: 67
Discharge: HOME | End: 2017-04-04
Payer: MEDICARE

## 2017-04-04 PROCEDURE — 97016 VASOPNEUMATIC DEVICE THERAPY: CPT

## 2017-04-04 PROCEDURE — 97110 THERAPEUTIC EXERCISES: CPT

## 2017-04-04 PROCEDURE — 97140 MANUAL THERAPY 1/> REGIONS: CPT

## 2017-04-04 NOTE — PROGRESS NOTES
Larry Neal  : 1950 [facility] [place] [address], [place] [address].  [phone]   [phone]            OUTPATIENT PHYSICAL THERAPY:Daily Note 2017    ICD-10: Treatment Diagnosis: Difficulty in walking, not elsewhere classified (R26.2)  Precautions/Allergies:   Morphine; Nitrofurantoin; Oxycodone-acetaminophen; Propranolol; and Sulfa (sulfonamide antibiotics)   Fall Risk Score: 1 (? 5 = High Risk)  MD Orders: eval and treat MEDICAL/REFERRING DIAGNOSIS:  Achilles tendinitis, left leg [M76.62]  Contracture, left ankle [M24.572]   DATE OF ONSET: 3-16-17  REFERRING PHYSICIAN: Dominga Patterson MD  RETURN PHYSICIAN APPOINTMENT: unsure date     INITIAL ASSESSMENT:  Ms. Servando Ortiz presents s/p LEFT ACHILLES TENDON REPAIR / Linda Yadiel / Remi Innocent / [facility] . she will benefit from skilled PT to assist with pain control and ease of function. PROBLEM LIST (Impacting functional limitations):  1. Decreased Strength  2. Decreased ADL/Functional Activities  3. Decreased Balance  4. Increased Pain  5. Decreased Flexibility/Joint Mobility  6. Decreased [place] with Home Exercise Program INTERVENTIONS PLANNED:  1. Cold  2. Cryotherapy  3. Electrical Stimulation  4. Gait Training  5. Heat  6. Home Exercise Program (HEP)  7. Manual Therapy  8. Range of Motion (ROM)  9. Therapeutic Activites  10. Therapeutic Exercise/Strengthening   TREATMENT PLAN:  Effective Dates: 3-29-17 TO 17. Frequency/Duration: 2 times a week for 8 weeks  GOALS: (Goals have been discussed and agreed upon with patient.)  Short-Term Functional Goals: Time Frame: 4 weeks  1. Pt will be I with phase appropriate HEP to assist with ROM and strength foot. 2. Pt will improve dorsiflexion to 15 degrees to improve length of Achilles involved foot. Discharge Goals: Time Frame: 8 weeks  1.  Pt will improve strength involved foot to demonstrate 10 single leg heel raises in standing. 2. Pt will ambulate with non-antalgic gait pattern. 3. Pt will report ability to walk as long as desired without increased foot pain. Rehabilitation Potential For Stated Goals: Good  Regarding Eliecer Araujo's therapy, I certify that the treatment plan above will be carried out by a therapist or under their direction. Thank you for this referral,  Brian Lerma PTA     Referring Physician Signature: Hamida Morel MD              Date                    The information in this section was collected on 3/29/17 (except where otherwise noted). HISTORY:   History of Present Injury/Illness (Reason for Referral):  LEFT ACHILLES TENDON REPAIR / FHL TRANSFER / [place] / Triston Gent. Pt reports this was all from bone spur. She was just able to get out of the boot on 3/28/17 and is walking with the aid of a single crutch. Past Medical History/Comorbidities:   Ms. Angie Reddy  has a past medical history of Acoustic neuroma ([facility] [address] (2011); Basal cell carcinoma; Benign essential tremor; Depression; Environmental allergies; Epiploic appendagitis (Dec 2014); Exposure to TB; Frequent UTI; GERD (gastroesophageal reflux disease); Hiatal hernia (01/26/2017); Hypothyroidism (acquired); Insomnia; Internal hemorrhoids (01/26/2017); Mixed hyperlipidemia; Neurogenic bladder; Osteopenia (2012); PONV (postoperative nausea and vomiting); Psoriasis; Situational anxiety; Symptomatic menopausal or female climacteric states; and Vitamin D deficiency. She also has no past medical history of Adverse effect of anesthesia; Difficult intubation; Malignant hyperthermia due to anesthesia; or Pseudocholinesterase deficiency. Ms. Angie Reddy  has a past surgical history that includes colonoscopy (4/2005 & 1/26/17); malignant skin lesion excision (10/2010); orthopaedic (Right, 8/27/15); orthopaedic (Right, 9/8/15); endoscopy (01/26/2017); and holly and bso (1994).   Social History/Living Environment:     lives in private residence  Prior Level of Function/Work/Activity:  I all activities. Like to walk and bike outdoors. Personal Factors:          Sex:  female        Age:  69 y.o. Current Medications:       Current Outpatient Prescriptions:     raNITIdine (ZANTAC 75) 75 mg tablet, Take 75 mg by mouth daily. , Disp: , Rfl:     Biotin 2,500 mcg cap, Take 1 Tab by mouth as needed. , Disp: , Rfl:     calcium-cholecalciferol, d3, (CALCIUM 600 + D) 600-125 mg-unit tab, Take 1 Tab by mouth two (2) times a day., Disp: , Rfl:     cholecalciferol, VITAMIN D3, (VITAMIN D3) 5,000 unit tab tablet, Take 5,000 Units by mouth every Monday, Wednesday, Friday., Disp: , Rfl:     Cetirizine (ZYRTEC) 10 mg cap, Take 1 Cap by mouth every morning., Disp: , Rfl:     clonazePAM (KLONOPIN) 1 mg tablet, Take 1 mg by mouth as needed. , Disp: , Rfl:     clonazePAM (KLONOPIN) 1 mg tablet, Take 1 mg by mouth nightly., Disp: , Rfl:     aspirin delayed-release 81 mg tablet, Take 81 mg by mouth nightly., Disp: , Rfl:     vilazodone (VIIBRYD) 40 mg tab tablet, Take 40 mg by mouth every morning., Disp: , Rfl:     levomefolate-algal oil (DEPLIN, ALGAL OIL,) 15-90.314 mg cap, Take 1 Cap by mouth every morning., Disp: , Rfl:     Dexlansoprazole (DEXILANT) 60 mg CpDB, Take 60 mg by mouth every morning., Disp: , Rfl:     FOLIC ACID/MULTIVIT-MIN/LUTEIN (CENTRUM SILVER PO), Take 1 Tab by mouth every morning., Disp: , Rfl:     KRILL OIL PO, Take 1 Tab by mouth daily (with lunch). , Disp: , Rfl:     SYNTHROID 50 mcg tablet, Take 1 Tab by mouth Daily (before breakfast). Brand medically necessary. , Disp: 30 Tab, Rfl: 5    clotrimazole-betamethasone (LOTRISONE) topical cream, Apply to affected area bid, Disp: 15 g, Rfl: 0    Denosumab (PROLIA) 60 mg/mL injection, 1 mL by SubCUTAneous route every 6 months. (Patient taking differently: 60 mg by SubCUTAneous route every 6 months.  Last injection 1/2017), Disp: 1 Syringe, Rfl: 1   Date Last Reviewed:  4/4/2017 Number of Personal Factors/Comorbidities that affect the Plan of Care: 1-2: MODERATE COMPLEXITY   EXAMINATION:   Observation/Orthostatic Postural Assessment:          notable swelling through L foot and ankle. ROM:  With knee bent         R foot:DF 15, PF 60, inv 45, eversion 20        L foot: DF 5, PF 30, inv 45, eversion 15  Strength:          R foot: 5/5 mid range throughout        L foot 4- to 4/5 throughout mid range  Functional Mobility:         Gait/Ambulation:  Use if single crutch, antalgic gait pattern        Stairs:  Not tested today  Balance:          Single leg balance. Unable test L today   Body Structures Involved:  1. Bones  2. Joints  3. Muscles  4. Ligaments Body Functions Affected:  1. Neuromusculoskeletal Activities and Participation Affected:  1. Learning and Applying Knowledge  2. Communication  3. Mobility  4. Self Care  5. Community, Social and [place]   Number of elements (examined above) that affect the Plan of Care: 3: MODERATE COMPLEXITY   CLINICAL PRESENTATION:   Presentation: Evolving clinical presentation with changing clinical characteristics: MODERATE COMPLEXITY   CLINICAL DECISION MAKING:   Outcome Measure: Tool Used: PT/OT FOOT AND ANKLE ABILITY MEASURE  Score:  Initial: 38 Most Recent: X (Date: -- )   Interpretation of Score: For the \"Activities of Daily Living\", there are 21 questions each scored on a 5 point scale with 0 representing \"Unable to do\" and 4 representing \"No difficulty\". The lower the score, the greater the functional disability. 84/84 represents no disability. Minimal detectable change is 5.7 points. With the addition of the 8 questions in the \"Sports Subscale,\" there are 29 questions, each scored on a 5 point scale with 0 representing \"Unable to do\" and 4 representing \"No difficulty\". The lower the score, the greater the functional disability. 116/116 represents no disability. Minimal detectable change is 12.3 points.     Activities of Daily Living:  Score 84 83-68 67-51 50-34 33-18 17-1 0   Modifier CH CI CJ CK CL CM CN     Activities of Daily Living + Sports Subscale:  Score 116 115-94 93-71 70-47 46-24 23-1 0   Modifier CH CI CJ CK CL CM CN     ? Mobility - Walking and Moving Around:     - CURRENT STATUS: CK - 40%-59% impaired, limited or restricted    - GOAL STATUS: CI - 1%-19% impaired, limited or restricted    - D/C STATUS:  ---------------To be determined---------------    Medical Necessity:   · Patient is expected to demonstrate progress in strength, range of motion and balance to increase ease of functional mobility. Reason for Services/Other Comments:  · Patient continues to require skilled intervention due to achillies tendonitis. Use of outcome tool(s) and clinical judgement create a POC that gives a: Questionable prediction of patient's progress: MODERATE COMPLEXITY            TREATMENT:         (In addition to Assessment/Re-Assessment sessions the following treatments were rendered)  Pre-treatment Symptoms/Complaints: I have been doing my exercises  Pain: Initial:     5/10 L foot Post Session:  3/10 L foot     THERAPEUTIC EXERCISE: (30 minutes):  Exercises per grid below to improve mobility. Required minimal verbal and tactile cues to promote proper body alignment. Progressed resistance, range and repetitions as indicated. MANUAL THERAPY: (10 effluarage massage for swelling-light to Left foot minutes): Soft tissue mobilization was utilized and necessary because of the patient's restricted motion of soft tissue.    MODALITIES: (vasocompression ice machine to Left foot for swelling x 10 minutes minutes):      Ice-machine    Date:  4-4-17 Date:   Date:     Activity/Exercise Parameters Parameters Parameters   alphabet x1     Towel scrunch x10     Seated dorsiflexion/plantarflexion x20     Nu-Step  X 8 min Level 4     [place]  One set     Yellow Tband all directions Yellow x 15 reps each way     Light Stretch off step 10 sec hold x 3 reps HEP (gentle)     Toe stretch HEP     Rocker Board Fwd -bwd x 15 reps     DF-PF  IV ER X 10 each          Treatment/Session Assessment:       · Response to Treatment:   Patient did well today, added a few more exercises and stretching on today. Encouraged patient to ambulate heel toe push off. She is not able to wear a regular shoe at this time secondary to rubbing on achillies. · Compliance with Program/Exercises: Compliant  · Recommendations/Intent for next treatment session: \"Next visit will focus on advancements to more challenging activities\".   Total Treatment Duration:  PT Patient Time In/Time Out  Time In: 1025  Time Out: [address], PTA

## 2017-04-06 ENCOUNTER — HOSPITAL ENCOUNTER (OUTPATIENT)
Dept: PHYSICAL THERAPY | Age: 67
Discharge: HOME | End: 2017-04-06
Payer: MEDICARE

## 2017-04-06 PROCEDURE — 97110 THERAPEUTIC EXERCISES: CPT

## 2017-04-06 PROCEDURE — 97140 MANUAL THERAPY 1/> REGIONS: CPT

## 2017-04-06 PROCEDURE — 97016 VASOPNEUMATIC DEVICE THERAPY: CPT

## 2017-04-06 NOTE — PROGRESS NOTES
Stephanie Hughes  : 1950 [facility] [place] [address], [place] [address].  [phone]   [phone]            OUTPATIENT PHYSICAL THERAPY:Daily Note 2017    ICD-10: Treatment Diagnosis: Difficulty in walking, not elsewhere classified (R26.2)  Precautions/Allergies:   Morphine; Nitrofurantoin; Oxycodone-acetaminophen; Propranolol; and Sulfa (sulfonamide antibiotics)   Fall Risk Score: 1 (? 5 = High Risk)  MD Orders: eval and treat MEDICAL/REFERRING DIAGNOSIS:  Achilles tendinitis, left leg [M76.62]  Contracture, left ankle [M24.572]   DATE OF ONSET: 3-16-17  REFERRING PHYSICIAN: Romulo Barron MD  RETURN PHYSICIAN APPOINTMENT: unsure date     INITIAL ASSESSMENT:  Ms. Miller Lopez presents s/p LEFT ACHILLES TENDON REPAIR / Aubery [place] / Martha Garsia / Yony Interiano . she will benefit from skilled PT to assist with pain control and ease of function. PROBLEM LIST (Impacting functional limitations):  1. Decreased Strength  2. Decreased ADL/Functional Activities  3. Decreased Balance  4. Increased Pain  5. Decreased Flexibility/Joint Mobility  6. Decreased [place] with Home Exercise Program INTERVENTIONS PLANNED:  1. Cold  2. Cryotherapy  3. Electrical Stimulation  4. Gait Training  5. Heat  6. Home Exercise Program (HEP)  7. Manual Therapy  8. Range of Motion (ROM)  9. Therapeutic Activites  10. Therapeutic Exercise/Strengthening   TREATMENT PLAN:  Effective Dates: 3-29-17 TO 17. Frequency/Duration: 2 times a week for 8 weeks  GOALS: (Goals have been discussed and agreed upon with patient.)  Short-Term Functional Goals: Time Frame: 4 weeks  1. Pt will be I with phase appropriate HEP to assist with ROM and strength foot. 2. Pt will improve dorsiflexion to 15 degrees to improve length of Achilles involved foot. Discharge Goals: Time Frame: 8 weeks  1.  Pt will improve strength involved foot to demonstrate 10 single leg heel raises in standing. 2. Pt will ambulate with non-antalgic gait pattern. 3. Pt will report ability to walk as long as desired without increased foot pain. Rehabilitation Potential For Stated Goals: Good  Regarding Maria Guadalupe Araujo's therapy, I certify that the treatment plan above will be carried out by a therapist or under their direction. Thank you for this referral,  Marino Ackerman PTA     Referring Physician Signature: Marianela Brown MD              Date                    The information in this section was collected on 3/29/17 (except where otherwise noted). HISTORY:   History of Present Injury/Illness (Reason for Referral):  LEFT ACHILLES TENDON REPAIR / FHL TRANSFER / Barbra Viramontes / Julius Xiong. Pt reports this was all from bone spur. She was just able to get out of the boot on 3/28/17 and is walking with the aid of a single crutch. Past Medical History/Comorbidities:   Ms. Isabella Peterson  has a past medical history of Acoustic neuroma ([facility] [address] (2011); Basal cell carcinoma; Benign essential tremor; Depression; Environmental allergies; Epiploic appendagitis (Dec 2014); Exposure to TB; Frequent UTI; GERD (gastroesophageal reflux disease); Hiatal hernia (01/26/2017); Hypothyroidism (acquired); Insomnia; Internal hemorrhoids (01/26/2017); Mixed hyperlipidemia; Neurogenic bladder; Osteopenia (2012); PONV (postoperative nausea and vomiting); Psoriasis; Situational anxiety; Symptomatic menopausal or female climacteric states; and Vitamin D deficiency. She also has no past medical history of Adverse effect of anesthesia; Difficult intubation; Malignant hyperthermia due to anesthesia; or Pseudocholinesterase deficiency. Ms. Isabella Peterson  has a past surgical history that includes colonoscopy (4/2005 & 1/26/17); malignant skin lesion excision (10/2010); orthopaedic (Right, 8/27/15); orthopaedic (Right, 9/8/15); endoscopy (01/26/2017); and holly and bso (1994).   Social History/Living Environment:     lives in private residence  Prior Level of Function/Work/Activity:  I all activities. Like to walk and bike outdoors. Personal Factors:          Sex:  female        Age:  69 y.o. Current Medications:       Current Outpatient Prescriptions:     raNITIdine (ZANTAC 75) 75 mg tablet, Take 75 mg by mouth daily. , Disp: , Rfl:     Biotin 2,500 mcg cap, Take 1 Tab by mouth as needed. , Disp: , Rfl:     calcium-cholecalciferol, d3, (CALCIUM 600 + D) 600-125 mg-unit tab, Take 1 Tab by mouth two (2) times a day., Disp: , Rfl:     cholecalciferol, VITAMIN D3, (VITAMIN D3) 5,000 unit tab tablet, Take 5,000 Units by mouth every Monday, Wednesday, Friday., Disp: , Rfl:     Cetirizine (ZYRTEC) 10 mg cap, Take 1 Cap by mouth every morning., Disp: , Rfl:     clonazePAM (KLONOPIN) 1 mg tablet, Take 1 mg by mouth as needed. , Disp: , Rfl:     clonazePAM (KLONOPIN) 1 mg tablet, Take 1 mg by mouth nightly., Disp: , Rfl:     aspirin delayed-release 81 mg tablet, Take 81 mg by mouth nightly., Disp: , Rfl:     vilazodone (VIIBRYD) 40 mg tab tablet, Take 40 mg by mouth every morning., Disp: , Rfl:     levomefolate-algal oil (DEPLIN, ALGAL OIL,) 15-90.314 mg cap, Take 1 Cap by mouth every morning., Disp: , Rfl:     Dexlansoprazole (DEXILANT) 60 mg CpDB, Take 60 mg by mouth every morning., Disp: , Rfl:     FOLIC ACID/MULTIVIT-MIN/LUTEIN (CENTRUM SILVER PO), Take 1 Tab by mouth every morning., Disp: , Rfl:     KRILL OIL PO, Take 1 Tab by mouth daily (with lunch). , Disp: , Rfl:     SYNTHROID 50 mcg tablet, Take 1 Tab by mouth Daily (before breakfast). Brand medically necessary. , Disp: 30 Tab, Rfl: 5    clotrimazole-betamethasone (LOTRISONE) topical cream, Apply to affected area bid, Disp: 15 g, Rfl: 0    Denosumab (PROLIA) 60 mg/mL injection, 1 mL by SubCUTAneous route every 6 months. (Patient taking differently: 60 mg by SubCUTAneous route every 6 months.  Last injection 1/2017), Disp: 1 Syringe, Rfl: 1   Date Last Reviewed:  4/6/2017 Number of Personal Factors/Comorbidities that affect the Plan of Care: 1-2: MODERATE COMPLEXITY   EXAMINATION:   Observation/Orthostatic Postural Assessment:          notable swelling through L foot and ankle. ROM:  With knee bent         R foot:DF 15, PF 60, inv 45, eversion 20        L foot: DF 5, PF 30, inv 45, eversion 15  Strength:          R foot: 5/5 mid range throughout        L foot 4- to 4/5 throughout mid range  Functional Mobility:         Gait/Ambulation:  Use if single crutch, antalgic gait pattern        Stairs:  Not tested today  Balance:          Single leg balance. Unable test L today   Body Structures Involved:  1. Bones  2. Joints  3. Muscles  4. Ligaments Body Functions Affected:  1. Neuromusculoskeletal Activities and Participation Affected:  1. Learning and Applying Knowledge  2. Communication  3. Mobility  4. Self Care  5. Community, Social and [place]   Number of elements (examined above) that affect the Plan of Care: 3: MODERATE COMPLEXITY   CLINICAL PRESENTATION:   Presentation: Evolving clinical presentation with changing clinical characteristics: MODERATE COMPLEXITY   CLINICAL DECISION MAKING:   Outcome Measure: Tool Used: PT/OT FOOT AND ANKLE ABILITY MEASURE  Score:  Initial: 38 Most Recent: X (Date: -- )   Interpretation of Score: For the \"Activities of Daily Living\", there are 21 questions each scored on a 5 point scale with 0 representing \"Unable to do\" and 4 representing \"No difficulty\". The lower the score, the greater the functional disability. 84/84 represents no disability. Minimal detectable change is 5.7 points. With the addition of the 8 questions in the \"Sports Subscale,\" there are 29 questions, each scored on a 5 point scale with 0 representing \"Unable to do\" and 4 representing \"No difficulty\". The lower the score, the greater the functional disability. 116/116 represents no disability. Minimal detectable change is 12.3 points.     Activities of Daily Living:  Score 84 83-68 67-51 50-34 33-18 17-1 0   Modifier CH CI CJ CK CL CM CN     Activities of Daily Living + Sports Subscale:  Score 116 115-94 93-71 70-47 46-24 23-1 0   Modifier CH CI CJ CK CL CM CN     ? Mobility - Walking and Moving Around:     - CURRENT STATUS: CK - 40%-59% impaired, limited or restricted    - GOAL STATUS: CI - 1%-19% impaired, limited or restricted    - D/C STATUS:  ---------------To be determined---------------    Medical Necessity:   · Patient is expected to demonstrate progress in strength, range of motion and balance to increase ease of functional mobility. Reason for Services/Other Comments:  · Patient continues to require skilled intervention due to achillies tendonitis. Use of outcome tool(s) and clinical judgement create a POC that gives a: Questionable prediction of patient's progress: MODERATE COMPLEXITY            TREATMENT:         (In addition to Assessment/Re-Assessment sessions the following treatments were rendered)  Pre-treatment Symptoms/Complaints \" Look, no crutch\"  Pain: Initial:     3/10 L foot Post Session:  3/10 L foot     THERAPEUTIC EXERCISE: (30 minutes):  Exercises per grid below to improve strength. Required minimal verbal and tactile cues to promote proper body alignment. Progressed resistance, range and repetitions as indicated. MANUAL THERAPY: (10 effluarage massage for swelling-light to Left foot and scar tissue massage  minutes): Soft tissue mobilization was utilized and necessary because of the patient's restricted motion of soft tissue.    MODALITIES: (vasocompression ice machine to Left foot for swelling x 10 minutes minutes):      Ice-machine    Date:  4-6-17 Date:   Date:     Activity/Exercise Parameters Parameters Parameters   alphabet x1     Towel scrunch x10     Seated dorsiflexion/plantarflexion x20     Nu-Step  X 8 min Level 4     [place]  One set     RedTband all directions Red x 15 reps each way     Light Stretch off step 10 sec hold x 3 reps HEP (gentle)     Toe stretch HEP     Rocker Board Fwd -bwd x 15 reps     DF-PF  IV ER X 10 each          Treatment/Session Assessment:       · Response to Treatment:   Patient ambulated into clinic without a crutch, slow cadance but feeling better. She continue's to be sensitive in the achillie's region. Unable to wear regular shoe with a back because of tenderness in heel region. Patient seeing improvement overall. ·   · Compliance with Program/Exercises: Compliant  · Recommendations/Intent for next treatment session: \"Next visit will focus on advancements to more challenging activities\".   Total Treatment Duration:  PT Patient Time In/Time Out  Time In: 1300  Time Out: [address] [facility], [facility]

## 2017-04-10 ENCOUNTER — HOSPITAL ENCOUNTER (OUTPATIENT)
Dept: PHYSICAL THERAPY | Age: 67
Discharge: HOME | End: 2017-04-10
Payer: MEDICARE

## 2017-04-10 PROCEDURE — 97110 THERAPEUTIC EXERCISES: CPT

## 2017-04-10 PROCEDURE — 97140 MANUAL THERAPY 1/> REGIONS: CPT

## 2017-04-10 PROCEDURE — 97016 VASOPNEUMATIC DEVICE THERAPY: CPT

## 2017-04-10 NOTE — PROGRESS NOTES
Isaac Mcfarlane  : 1950 [facility] [place] [address], [place] [address].  [phone]   [phone]            OUTPATIENT PHYSICAL THERAPY:Daily Note 4/10/2017    ICD-10: Treatment Diagnosis: Difficulty in walking, not elsewhere classified (R26.2)  Precautions/Allergies:   Morphine; Nitrofurantoin; Oxycodone-acetaminophen; Propranolol; and Sulfa (sulfonamide antibiotics)   Fall Risk Score: 1 (? 5 = High Risk)  MD Orders: eval and treat MEDICAL/REFERRING DIAGNOSIS:  Achilles tendinitis, left leg [M76.62]  Contracture, left ankle [M24.572]   DATE OF ONSET: 3-16-17  REFERRING PHYSICIAN: Rosaline Hodges MD  RETURN PHYSICIAN APPOINTMENT: unsure date     INITIAL ASSESSMENT:  Ms. Cinda Tejeda presents s/p LEFT ACHILLES TENDON REPAIR / Dinh Bliss / [facility] / Bharati Stovall . she will benefit from skilled PT to assist with pain control and ease of function. PROBLEM LIST (Impacting functional limitations):  1. Decreased Strength  2. Decreased ADL/Functional Activities  3. Decreased Balance  4. Increased Pain  5. Decreased Flexibility/Joint Mobility  6. Decreased [place] with Home Exercise Program INTERVENTIONS PLANNED:  1. Cold  2. Cryotherapy  3. Electrical Stimulation  4. Gait Training  5. Heat  6. Home Exercise Program (HEP)  7. Manual Therapy  8. Range of Motion (ROM)  9. Therapeutic Activites  10. Therapeutic Exercise/Strengthening   TREATMENT PLAN:  Effective Dates: 3-29-17 TO 17. Frequency/Duration: 2 times a week for 8 weeks  GOALS: (Goals have been discussed and agreed upon with patient.)  Short-Term Functional Goals: Time Frame: 4 weeks  1. Pt will be I with phase appropriate HEP to assist with ROM and strength foot. 2. Pt will improve dorsiflexion to 15 degrees to improve length of Achilles involved foot. Discharge Goals: Time Frame: 8 weeks  1.  Pt will improve strength involved foot to demonstrate 10 single leg heel raises in standing. 2. Pt will ambulate with non-antalgic gait pattern. 3. Pt will report ability to walk as long as desired without increased foot pain. Rehabilitation Potential For Stated Goals: Good  Regarding Eliecer Araujo's therapy, I certify that the treatment plan above will be carried out by a therapist or under their direction. Thank you for this referral,  Isaac Sun PT     Referring Physician Signature: Hamida Morel MD              Date                    The information in this section was collected on 3/29/17 (except where otherwise noted). HISTORY:   History of Present Injury/Illness (Reason for Referral):  LEFT ACHILLES TENDON REPAIR / FHL TRANSFER / [place] Sago / Triston Gent. Pt reports this was all from bone spur. She was just able to get out of the boot on 3/28/17 and is walking with the aid of a single crutch. Past Medical History/Comorbidities:   Ms. Angie Reddy  has a past medical history of Acoustic neuroma ([facility] [address] (2011); Basal cell carcinoma; Benign essential tremor; Depression; Environmental allergies; Epiploic appendagitis (Dec 2014); Exposure to TB; Frequent UTI; GERD (gastroesophageal reflux disease); Hiatal hernia (01/26/2017); Hypothyroidism (acquired); Insomnia; Internal hemorrhoids (01/26/2017); Mixed hyperlipidemia; Neurogenic bladder; Osteopenia (2012); PONV (postoperative nausea and vomiting); Psoriasis; Situational anxiety; Symptomatic menopausal or female climacteric states; and Vitamin D deficiency. She also has no past medical history of Adverse effect of anesthesia; Difficult intubation; Malignant hyperthermia due to anesthesia; or Pseudocholinesterase deficiency. Ms. Angie Reddy  has a past surgical history that includes colonoscopy (4/2005 & 1/26/17); malignant skin lesion excision (10/2010); orthopaedic (Right, 8/27/15); orthopaedic (Right, 9/8/15); endoscopy (01/26/2017); and holly and bso (1994).   Social History/Living Environment:     lives in private residence  Prior Level of Function/Work/Activity:  I all activities. Like to walk and bike outdoors. Personal Factors:          Sex:  female        Age:  69 y.o. Current Medications:       Current Outpatient Prescriptions:     raNITIdine (ZANTAC 75) 75 mg tablet, Take 75 mg by mouth daily. , Disp: , Rfl:     Biotin 2,500 mcg cap, Take 1 Tab by mouth as needed. , Disp: , Rfl:     calcium-cholecalciferol, d3, (CALCIUM 600 + D) 600-125 mg-unit tab, Take 1 Tab by mouth two (2) times a day., Disp: , Rfl:     cholecalciferol, VITAMIN D3, (VITAMIN D3) 5,000 unit tab tablet, Take 5,000 Units by mouth every Monday, Wednesday, Friday., Disp: , Rfl:     Cetirizine (ZYRTEC) 10 mg cap, Take 1 Cap by mouth every morning., Disp: , Rfl:     clonazePAM (KLONOPIN) 1 mg tablet, Take 1 mg by mouth as needed. , Disp: , Rfl:     clonazePAM (KLONOPIN) 1 mg tablet, Take 1 mg by mouth nightly., Disp: , Rfl:     aspirin delayed-release 81 mg tablet, Take 81 mg by mouth nightly., Disp: , Rfl:     vilazodone (VIIBRYD) 40 mg tab tablet, Take 40 mg by mouth every morning., Disp: , Rfl:     levomefolate-algal oil (DEPLIN, ALGAL OIL,) 15-90.314 mg cap, Take 1 Cap by mouth every morning., Disp: , Rfl:     Dexlansoprazole (DEXILANT) 60 mg CpDB, Take 60 mg by mouth every morning., Disp: , Rfl:     FOLIC ACID/MULTIVIT-MIN/LUTEIN (CENTRUM SILVER PO), Take 1 Tab by mouth every morning., Disp: , Rfl:     KRILL OIL PO, Take 1 Tab by mouth daily (with lunch). , Disp: , Rfl:     SYNTHROID 50 mcg tablet, Take 1 Tab by mouth Daily (before breakfast). Brand medically necessary. , Disp: 30 Tab, Rfl: 5    clotrimazole-betamethasone (LOTRISONE) topical cream, Apply to affected area bid, Disp: 15 g, Rfl: 0    Denosumab (PROLIA) 60 mg/mL injection, 1 mL by SubCUTAneous route every 6 months. (Patient taking differently: 60 mg by SubCUTAneous route every 6 months.  Last injection 1/2017), Disp: 1 Syringe, Rfl: 1   Date Last Reviewed:  4/10/2017 Number of Personal Factors/Comorbidities that affect the Plan of Care: 1-2: MODERATE COMPLEXITY   EXAMINATION:   Observation/Orthostatic Postural Assessment:          notable swelling through L foot and ankle. ROM:  With knee bent         R foot:DF 15, PF 60, inv 45, eversion 20        L foot: DF 5, PF 30, inv 45, eversion 15  Strength:          R foot: 5/5 mid range throughout        L foot 4- to 4/5 throughout mid range  Functional Mobility:         Gait/Ambulation:  Use if single crutch, antalgic gait pattern        Stairs:  Not tested today  Balance:          Single leg balance. Unable test L today   Body Structures Involved:  1. Bones  2. Joints  3. Muscles  4. Ligaments Body Functions Affected:  1. Neuromusculoskeletal Activities and Participation Affected:  1. Learning and Applying Knowledge  2. Communication  3. Mobility  4. Self Care  5. Community, Social and [place]   Number of elements (examined above) that affect the Plan of Care: 3: MODERATE COMPLEXITY   CLINICAL PRESENTATION:   Presentation: Evolving clinical presentation with changing clinical characteristics: MODERATE COMPLEXITY   CLINICAL DECISION MAKING:   Outcome Measure: Tool Used: PT/OT FOOT AND ANKLE ABILITY MEASURE  Score:  Initial: 38 Most Recent: X (Date: -- )   Interpretation of Score: For the \"Activities of Daily Living\", there are 21 questions each scored on a 5 point scale with 0 representing \"Unable to do\" and 4 representing \"No difficulty\". The lower the score, the greater the functional disability. 84/84 represents no disability. Minimal detectable change is 5.7 points. With the addition of the 8 questions in the \"Sports Subscale,\" there are 29 questions, each scored on a 5 point scale with 0 representing \"Unable to do\" and 4 representing \"No difficulty\". The lower the score, the greater the functional disability. 116/116 represents no disability. Minimal detectable change is 12.3 points.     Activities of Daily Living:  Score 84 83-68 67-51 50-34 33-18 17-1 0   Modifier CH CI CJ CK CL CM CN     Activities of Daily Living + Sports Subscale:  Score 116 115-94 93-71 70-47 46-24 23-1 0   Modifier CH CI CJ CK CL CM CN     ? Mobility - Walking and Moving Around:     - CURRENT STATUS: CK - 40%-59% impaired, limited or restricted    - GOAL STATUS: CI - 1%-19% impaired, limited or restricted    - D/C STATUS:  ---------------To be determined---------------    Medical Necessity:   · Patient is expected to demonstrate progress in strength, range of motion and balance to increase ease of functional mobility. Reason for Services/Other Comments:  · Patient continues to require skilled intervention due to achillies tendonitis. Use of outcome tool(s) and clinical judgement create a POC that gives a: Questionable prediction of patient's progress: MODERATE COMPLEXITY            TREATMENT:         (In addition to Assessment/Re-Assessment sessions the following treatments were rendered)  Pre-treatment Symptoms/Complaints \"\"im sore and swollen today\" 4-5/10  Pain: Initial:     3/10 L foot Post Session:  3/10 L foot     THERAPEUTIC EXERCISE: (20 minutes):  Exercises per grid below to improve strength. Required minimal verbal and tactile cues to promote proper body alignment. Progressed resistance, range and repetitions as indicated. MANUAL THERAPY: (10 effluarage massage for swelling-light to Left foot and scar tissue massage  minutes): Soft tissue mobilization was utilized and necessary because of the patient's restricted motion of soft tissue.    MODALITIES: (vasocompression ice machine to Left foot for swelling x 10 minutes minutes):      Ice-machine    Date:  4-6-17 Date:  4-10-17 Date:     Activity/Exercise Parameters Parameters Parameters   alphabet x1 x1    Towel scrunch x10 Seated toe flexion instead today    Seated dorsiflexion/plantarflexion x20 x20    Nu-Step  X 8 min Level 4 8 min L4    [place]  One set -- RedTband all directions Red x 15 reps each way AROM all directions today    Light Stretch off step 10 sec hold x 3 reps HEP (gentle) Incline stretch 15sx2    Toe stretch HEP --    Rocker Board Fwd -bwd x 15 reps --    DF-PF  IV ER X 10 each AROM all planes         Treatment/Session Assessment:       · Response to Treatment:   Patient no longer using crutch. She walked around and had grand kids yesterday. Her foot is really swollen and pretty sore today. We performed a modified exercise routine today with plans to return to regular one next visit. ·   · Compliance with Program/Exercises: Compliant  · Recommendations/Intent for next treatment session: \"Next visit will focus on advancements to more challenging activities\".   Total Treatment Duration:  PT Patient Time In/Time Out  Time In: 1115  Time Out: 1200    Mariah Navarrete PT

## 2017-04-12 ENCOUNTER — HOSPITAL ENCOUNTER (OUTPATIENT)
Dept: PHYSICAL THERAPY | Age: 67
Discharge: HOME | End: 2017-04-12
Payer: MEDICARE

## 2017-04-12 PROCEDURE — 97016 VASOPNEUMATIC DEVICE THERAPY: CPT

## 2017-04-12 PROCEDURE — 97110 THERAPEUTIC EXERCISES: CPT

## 2017-04-12 PROCEDURE — 97140 MANUAL THERAPY 1/> REGIONS: CPT

## 2017-04-12 NOTE — PROGRESS NOTES
Therese Andino  : 1950 [facility] [place] [address] [facility], [place] [address].  [phone]   [phone]            OUTPATIENT PHYSICAL THERAPY:Daily Note 2017    ICD-10: Treatment Diagnosis: Difficulty in walking, not elsewhere classified (R26.2)  Precautions/Allergies:   Morphine; Nitrofurantoin; Oxycodone-acetaminophen; Propranolol; and Sulfa (sulfonamide antibiotics)   Fall Risk Score: 1 (? 5 = High Risk)  MD Orders: eval and treat MEDICAL/REFERRING DIAGNOSIS:  Achilles tendinitis, left leg [M76.62]  Contracture, left ankle [M24.572]   DATE OF ONSET: 3-16-17  REFERRING PHYSICIAN: Belgica Pruitt MD  RETURN PHYSICIAN APPOINTMENT: unsure date     INITIAL ASSESSMENT:  Ms. Micheal Morris presents s/p LEFT ACHILLES TENDON REPAIR / Laine Salts / Jacqulyne Pedro / Glendia Blind . she will benefit from skilled PT to assist with pain control and ease of function. PROBLEM LIST (Impacting functional limitations):  1. Decreased Strength  2. Decreased ADL/Functional Activities  3. Decreased Balance  4. Increased Pain  5. Decreased Flexibility/Joint Mobility  6. Decreased [place] with Home Exercise Program INTERVENTIONS PLANNED:  1. Cold  2. Cryotherapy  3. Electrical Stimulation  4. Gait Training  5. Heat  6. Home Exercise Program (HEP)  7. Manual Therapy  8. Range of Motion (ROM)  9. Therapeutic Activites  10. Therapeutic Exercise/Strengthening   TREATMENT PLAN:  Effective Dates: 3-29-17 TO 17. Frequency/Duration: 2 times a week for 8 weeks  GOALS: (Goals have been discussed and agreed upon with patient.)  Short-Term Functional Goals: Time Frame: 4 weeks  1. Pt will be I with phase appropriate HEP to assist with ROM and strength foot. 2. Pt will improve dorsiflexion to 15 degrees to improve length of Achilles involved foot. Discharge Goals: Time Frame: 8 weeks  1.  Pt will improve strength involved foot to demonstrate 10 single leg heel raises in standing. 2. Pt will ambulate with non-antalgic gait pattern. 3. Pt will report ability to walk as long as desired without increased foot pain. Rehabilitation Potential For Stated Goals: Good  Regarding Meme Araujo's therapy, I certify that the treatment plan above will be carried out by a therapist or under their direction. Thank you for this referral,  Rebeca Isbell PT     Referring Physician Signature: Tracey Rodriguez MD              Date                    The information in this section was collected on 3/29/17 (except where otherwise noted). HISTORY:   History of Present Injury/Illness (Reason for Referral):  LEFT ACHILLES TENDON REPAIR / FHL TRANSFER / Lonnie Meyer / Jennifer Sahu. Pt reports this was all from bone spur. She was just able to get out of the boot on 3/28/17 and is walking with the aid of a single crutch. Past Medical History/Comorbidities:   Ms. Ramiro Kaplan  has a past medical history of Acoustic neuroma ([facility] [address] (2011); Basal cell carcinoma; Benign essential tremor; Depression; Environmental allergies; Epiploic appendagitis (Dec 2014); Exposure to TB; Frequent UTI; GERD (gastroesophageal reflux disease); Hiatal hernia (01/26/2017); Hypothyroidism (acquired); Insomnia; Internal hemorrhoids (01/26/2017); Mixed hyperlipidemia; Neurogenic bladder; Osteopenia (2012); PONV (postoperative nausea and vomiting); Psoriasis; Situational anxiety; Symptomatic menopausal or female climacteric states; and Vitamin D deficiency. She also has no past medical history of Adverse effect of anesthesia; Difficult intubation; Malignant hyperthermia due to anesthesia; or Pseudocholinesterase deficiency. Ms. Ramiro Kaplan  has a past surgical history that includes colonoscopy (4/2005 & 1/26/17); malignant skin lesion excision (10/2010); orthopaedic (Right, 8/27/15); orthopaedic (Right, 9/8/15); endoscopy (01/26/2017); and holly and bso (1994).   Social History/Living Environment:     lives in private residence  Prior Level of Function/Work/Activity:  I all activities. Like to walk and bike outdoors. Personal Factors:          Sex:  female        Age:  69 y.o. Current Medications:       Current Outpatient Prescriptions:     [START ON 6/1/2017] clonazePAM (KLONOPIN) 1 mg tablet, Take 1 Tab by mouth nightly. Max Daily Amount: 1 mg. Take 1 tablet po q AM as needed for anxiety & 1.5 tabets po q hs for sleep. Do not fill until 6/1/17 or after., Disp: 75 Tab, Rfl: 2    vilazodone (VIIBRYD) 40 mg tab tablet, Take 1 Tab by mouth every morning., Disp: 30 Tab, Rfl: 5    levomefolate-algal oil (DEPLIN, ALGAL OIL,) 15-90.314 mg cap, Take 1 Cap by mouth daily. , Disp: 90 Cap, Rfl: 3    raNITIdine (ZANTAC) 300 mg tablet, Take 1 Tab by mouth two (2) times daily (after meals). , Disp: 60 Tab, Rfl: 5    Biotin 2,500 mcg cap, Take 1 Tab by mouth as needed. , Disp: , Rfl:     calcium-cholecalciferol, d3, (CALCIUM 600 + D) 600-125 mg-unit tab, Take 1 Tab by mouth two (2) times a day., Disp: , Rfl:     cholecalciferol, VITAMIN D3, (VITAMIN D3) 5,000 unit tab tablet, Take 5,000 Units by mouth every Monday, Wednesday, Friday., Disp: , Rfl:     Cetirizine (ZYRTEC) 10 mg cap, Take 1 Cap by mouth every morning., Disp: , Rfl:     aspirin delayed-release 81 mg tablet, Take 81 mg by mouth nightly., Disp: , Rfl:     FOLIC ACID/MULTIVIT-MIN/LUTEIN (CENTRUM SILVER PO), Take 1 Tab by mouth every morning., Disp: , Rfl:     KRILL OIL PO, Take 1 Tab by mouth daily (with lunch). , Disp: , Rfl:     SYNTHROID 50 mcg tablet, Take 1 Tab by mouth Daily (before breakfast). Brand medically necessary. , Disp: 30 Tab, Rfl: 5    clotrimazole-betamethasone (LOTRISONE) topical cream, Apply to affected area bid, Disp: 15 g, Rfl: 0    Denosumab (PROLIA) 60 mg/mL injection, 1 mL by SubCUTAneous route every 6 months. (Patient taking differently: 60 mg by SubCUTAneous route every 6 months.  Last injection 1/2017), Disp: 1 Syringe, Rfl: 1   Date Last Reviewed:  4/12/2017    Number of Personal Factors/Comorbidities that affect the Plan of Care: 1-2: MODERATE COMPLEXITY   EXAMINATION:   Observation/Orthostatic Postural Assessment:          notable swelling through L foot and ankle. ROM:  With knee bent         R foot:DF 15, PF 60, inv 45, eversion 20        L foot: DF 5, PF 30, inv 45, eversion 15  Strength:          R foot: 5/5 mid range throughout        L foot 4- to 4/5 throughout mid range  Functional Mobility:         Gait/Ambulation:  Use if single crutch, antalgic gait pattern        Stairs:  Not tested today  Balance:          Single leg balance. Unable test L today   Body Structures Involved:  1. Bones  2. Joints  3. Muscles  4. Ligaments Body Functions Affected:  1. Neuromusculoskeletal Activities and Participation Affected:  1. Learning and Applying Knowledge  2. Communication  3. Mobility  4. Self Care  5. Community, Social and [place] Left [place]   Number of elements (examined above) that affect the Plan of Care: 3: MODERATE COMPLEXITY   CLINICAL PRESENTATION:   Presentation: Evolving clinical presentation with changing clinical characteristics: MODERATE COMPLEXITY   CLINICAL DECISION MAKING:   Outcome Measure: Tool Used: PT/OT FOOT AND ANKLE ABILITY MEASURE  Score:  Initial: 38 Most Recent: X (Date: -- )   Interpretation of Score: For the \"Activities of Daily Living\", there are 21 questions each scored on a 5 point scale with 0 representing \"Unable to do\" and 4 representing \"No difficulty\". The lower the score, the greater the functional disability. 84/84 represents no disability. Minimal detectable change is 5.7 points. With the addition of the 8 questions in the \"Sports Subscale,\" there are 29 questions, each scored on a 5 point scale with 0 representing \"Unable to do\" and 4 representing \"No difficulty\". The lower the score, the greater the functional disability. 116/116 represents no disability.   Minimal detectable change is 12.3 points. Activities of Daily Living:  Score 84 83-68 67-51 50-34 33-18 17-1 0   Modifier CH CI CJ CK CL CM CN     Activities of Daily Living + Sports Subscale:  Score 116 115-94 93-71 70-47 46-24 23-1 0   Modifier CH CI CJ CK CL CM CN     ? Mobility - Walking and Moving Around:     - CURRENT STATUS: CK - 40%-59% impaired, limited or restricted    - GOAL STATUS: CI - 1%-19% impaired, limited or restricted    - D/C STATUS:  ---------------To be determined---------------    Medical Necessity:   · Patient is expected to demonstrate progress in strength, range of motion and balance to increase ease of functional mobility. Reason for Services/Other Comments:  · Patient continues to require skilled intervention due to achillies tendonitis. Use of outcome tool(s) and clinical judgement create a POC that gives a: Questionable prediction of patient's progress: MODERATE COMPLEXITY            TREATMENT:         (In addition to Assessment/Re-Assessment sessions the following treatments were rendered)  Pre-treatment Symptoms/Complaints Pt states her L foot is swollen today and she is having increased pain from the swelling. Pain: Initial:     4-5/10 L foot Post Session:  3/10 L foot     THERAPEUTIC EXERCISE: (20 minutes):  Exercises per grid below to improve strength. Required minimal verbal and tactile cues to promote proper body alignment. Progressed resistance, range and repetitions as indicated. MANUAL THERAPY: (10 effluarage massage for swelling-light to Left foot and scar tissue massage  minutes): Soft tissue mobilization was utilized and necessary because of the patient's restricted motion of soft tissue.    MODALITIES: (vasocompression ice machine to Left foot for swelling x 10 minutes minutes):      Ice-machine    Date:  4-6-17 Date:  4-10-17 Date:  04-12-17   Activity/Exercise Parameters Parameters Parameters   alphabet x1 x1 1 rep  A-Z   Towel scrunch x10 Seated toe flexion instead today Seated dorsiflexion/plantarflexion x20 x20    Nu-Step  X 8 min Level 4 8 min L4 Level 4  8 minutes   [employer]  One set -- 20 reps   RedTband all directions Red x 15 reps each way AROM all directions today AROM all directions  20 reps each   Light Stretch off step 10 sec hold x 3 reps HEP (gentle) Incline stretch 15sx2 Gastroc Stretch with Towel  3 reps  20 sec holds   Toe stretch HEP --    Rocker Board Fwd -bwd x 15 reps --    DF-PF  IV ER X 10 each AROM all planes         Treatment/Session Assessment:       · Response to Treatment:   Pt tolerated session fairly well. She is going to space her therapy sessions apart (2 days in between) because she feels her swelling may be due to therapy sessions being so close together. I also issued pt a piece of \"D\" tubigrip to wear during the day to see if this helps decrease her swelling. Pt stated the tubigrip \"felt good. \"  ·   · Compliance with Program/Exercises: Compliant  · Recommendations/Intent for next treatment session: \"Next visit will focus on advancements to more challenging activities\".   Total Treatment Duration:  40 minutes  PT Patient Time In/Time Out  Time In: 4875  Time Out: [facility] [address] Denise Champion, PT

## 2017-04-17 ENCOUNTER — HOSPITAL ENCOUNTER (OUTPATIENT)
Dept: PHYSICAL THERAPY | Age: 67
End: 2017-04-17
Payer: MEDICARE

## 2017-04-19 ENCOUNTER — APPOINTMENT (OUTPATIENT)
Dept: PHYSICAL THERAPY | Age: 67
End: 2017-04-19
Payer: MEDICARE

## 2017-04-20 ENCOUNTER — HOSPITAL ENCOUNTER (OUTPATIENT)
Dept: PHYSICAL THERAPY | Age: 67
Discharge: HOME | End: 2017-04-20
Payer: MEDICARE

## 2017-04-20 PROCEDURE — 97016 VASOPNEUMATIC DEVICE THERAPY: CPT

## 2017-04-20 PROCEDURE — 97140 MANUAL THERAPY 1/> REGIONS: CPT

## 2017-04-20 PROCEDURE — 97110 THERAPEUTIC EXERCISES: CPT

## 2017-04-20 NOTE — PROGRESS NOTES
Nichole Londono  : 1950 [facility] [place] [address], [place] [address].  [phone]   [phone]            OUTPATIENT PHYSICAL THERAPY:Daily Note 2017    ICD-10: Treatment Diagnosis: Difficulty in walking, not elsewhere classified (R26.2)  Precautions/Allergies:   Morphine; Nitrofurantoin; Oxycodone-acetaminophen; Propranolol; and Sulfa (sulfonamide antibiotics)   Fall Risk Score: 1 (? 5 = High Risk)  MD Orders: eval and treat MEDICAL/REFERRING DIAGNOSIS:  Achilles tendinitis, left leg [M76.62]  Contracture, left ankle [M24.572]   DATE OF ONSET: 3-16-17  REFERRING PHYSICIAN: Melanie Brewster MD  RETURN PHYSICIAN APPOINTMENT: unsure date     INITIAL ASSESSMENT:  Ms. Leonor Goldberg presents s/p LEFT ACHILLES TENDON REPAIR / Orobdulio LopezTangirnaq / Priti Saha / Scott Huitron . she will benefit from skilled PT to assist with pain control and ease of function. PROBLEM LIST (Impacting functional limitations):  1. Decreased Strength  2. Decreased ADL/Functional Activities  3. Decreased Balance  4. Increased Pain  5. Decreased Flexibility/Joint Mobility  6. Decreased [place] with Home Exercise Program INTERVENTIONS PLANNED:  1. Cold  2. Cryotherapy  3. Electrical Stimulation  4. Gait Training  5. Heat  6. Home Exercise Program (HEP)  7. Manual Therapy  8. Range of Motion (ROM)  9. Therapeutic Activites  10. Therapeutic Exercise/Strengthening   TREATMENT PLAN:  Effective Dates: 3-29-17 TO 17. Frequency/Duration: 2 times a week for 8 weeks  GOALS: (Goals have been discussed and agreed upon with patient.)  Short-Term Functional Goals: Time Frame: 4 weeks  1. Pt will be I with phase appropriate HEP to assist with ROM and strength foot. 2. Pt will improve dorsiflexion to 15 degrees to improve length of Achilles involved foot. Discharge Goals: Time Frame: 8 weeks  1.  Pt will improve strength involved foot to demonstrate 10 single leg heel raises in standing. 2. Pt will ambulate with non-antalgic gait pattern. 3. Pt will report ability to walk as long as desired without increased foot pain. Rehabilitation Potential For Stated Goals: Good  Regarding Ivonne Araujo's therapy, I certify that the treatment plan above will be carried out by a therapist or under their direction. Thank you for this referral,  Didi Alexander PT     Referring Physician Signature: Sandra Lee MD              Date                    The information in this section was collected on 3/29/17 (except where otherwise noted). HISTORY:   History of Present Injury/Illness (Reason for Referral):  LEFT ACHILLES TENDON REPAIR / FHL TRANSFER / Sade Bowl / Kwame Saravia. Pt reports this was all from bone spur. She was just able to get out of the boot on 3/28/17 and is walking with the aid of a single crutch. Past Medical History/Comorbidities:   Ms. Mruali Daugherty  has a past medical history of Acoustic neuroma ([facility] [address] (2011); Basal cell carcinoma; Benign essential tremor; Depression; Environmental allergies; Epiploic appendagitis (Dec 2014); Exposure to TB; Frequent UTI; GERD (gastroesophageal reflux disease); Hiatal hernia (01/26/2017); Hypothyroidism (acquired); Insomnia; Internal hemorrhoids (01/26/2017); Mixed hyperlipidemia; Neurogenic bladder; Osteopenia (2012); PONV (postoperative nausea and vomiting); Psoriasis; Situational anxiety; Symptomatic menopausal or female climacteric states; and Vitamin D deficiency. She also has no past medical history of Adverse effect of anesthesia; Difficult intubation; Malignant hyperthermia due to anesthesia; or Pseudocholinesterase deficiency. Ms. Murali Daugherty  has a past surgical history that includes colonoscopy (4/2005 & 1/26/17); malignant skin lesion excision (10/2010); orthopaedic (Right, 8/27/15); orthopaedic (Right, 9/8/15); endoscopy (01/26/2017); and holly and bso (1994).   Social History/Living Environment:     lives in private residence  Prior Level of Function/Work/Activity:  I all activities. Like to walk and bike outdoors. Personal Factors:          Sex:  female        Age:  69 y.o. Current Medications:       Current Outpatient Prescriptions:     [START ON 6/1/2017] clonazePAM (KLONOPIN) 1 mg tablet, Take 1 Tab by mouth nightly. Max Daily Amount: 1 mg. Take 1 tablet po q AM as needed for anxiety & 1.5 tabets po q hs for sleep. Do not fill until 6/1/17 or after., Disp: 75 Tab, Rfl: 2    vilazodone (VIIBRYD) 40 mg tab tablet, Take 1 Tab by mouth every morning., Disp: 30 Tab, Rfl: 5    levomefolate-algal oil (DEPLIN, ALGAL OIL,) 15-90.314 mg cap, Take 1 Cap by mouth daily. , Disp: 90 Cap, Rfl: 3    raNITIdine (ZANTAC) 300 mg tablet, Take 1 Tab by mouth two (2) times daily (after meals). , Disp: 60 Tab, Rfl: 5    Biotin 2,500 mcg cap, Take 1 Tab by mouth as needed. , Disp: , Rfl:     calcium-cholecalciferol, d3, (CALCIUM 600 + D) 600-125 mg-unit tab, Take 1 Tab by mouth two (2) times a day., Disp: , Rfl:     cholecalciferol, VITAMIN D3, (VITAMIN D3) 5,000 unit tab tablet, Take 5,000 Units by mouth every Monday, Wednesday, Friday., Disp: , Rfl:     Cetirizine (ZYRTEC) 10 mg cap, Take 1 Cap by mouth every morning., Disp: , Rfl:     aspirin delayed-release 81 mg tablet, Take 81 mg by mouth nightly., Disp: , Rfl:     FOLIC ACID/MULTIVIT-MIN/LUTEIN (CENTRUM SILVER PO), Take 1 Tab by mouth every morning., Disp: , Rfl:     KRILL OIL PO, Take 1 Tab by mouth daily (with lunch). , Disp: , Rfl:     SYNTHROID 50 mcg tablet, Take 1 Tab by mouth Daily (before breakfast). Brand medically necessary. , Disp: 30 Tab, Rfl: 5    clotrimazole-betamethasone (LOTRISONE) topical cream, Apply to affected area bid, Disp: 15 g, Rfl: 0    Denosumab (PROLIA) 60 mg/mL injection, 1 mL by SubCUTAneous route every 6 months. (Patient taking differently: 60 mg by SubCUTAneous route every 6 months.  Last injection 1/2017), Disp: 1 Syringe, Rfl: 1   Date Last Reviewed:  4/20/2017    Number of Personal Factors/Comorbidities that affect the Plan of Care: 1-2: MODERATE COMPLEXITY   EXAMINATION:   Observation/Orthostatic Postural Assessment:          notable swelling through L foot and ankle. ROM:  With knee bent         R foot:DF 15, PF 60, inv 45, eversion 20        L foot: DF 5, PF 30, inv 45, eversion 15  Strength:          R foot: 5/5 mid range throughout        L foot 4- to 4/5 throughout mid range  Functional Mobility:         Gait/Ambulation:  Use if single crutch, antalgic gait pattern        Stairs:  Not tested today  Balance:          Single leg balance. Unable test L today   Body Structures Involved:  1. Bones  2. Joints  3. Muscles  4. Ligaments Body Functions Affected:  1. Neuromusculoskeletal Activities and Participation Affected:  1. Learning and Applying Knowledge  2. Communication  3. Mobility  4. Self Care  5. Community, Social and [place]   Number of elements (examined above) that affect the Plan of Care: 3: MODERATE COMPLEXITY   CLINICAL PRESENTATION:   Presentation: Evolving clinical presentation with changing clinical characteristics: MODERATE COMPLEXITY   CLINICAL DECISION MAKING:   Outcome Measure: Tool Used: PT/OT FOOT AND ANKLE ABILITY MEASURE  Score:  Initial: 38 Most Recent: X (Date: -- )   Interpretation of Score: For the \"Activities of Daily Living\", there are 21 questions each scored on a 5 point scale with 0 representing \"Unable to do\" and 4 representing \"No difficulty\". The lower the score, the greater the functional disability. 84/84 represents no disability. Minimal detectable change is 5.7 points. With the addition of the 8 questions in the \"Sports Subscale,\" there are 29 questions, each scored on a 5 point scale with 0 representing \"Unable to do\" and 4 representing \"No difficulty\". The lower the score, the greater the functional disability. 116/116 represents no disability.   Minimal detectable change is 12.3 points. Activities of Daily Living:  Score 84 83-68 67-51 50-34 33-18 17-1 0   Modifier CH CI CJ CK CL CM CN     Activities of Daily Living + Sports Subscale:  Score 116 115-94 93-71 70-47 46-24 23-1 0   Modifier CH CI CJ CK CL CM CN     ? Mobility - Walking and Moving Around:     - CURRENT STATUS: CK - 40%-59% impaired, limited or restricted    - GOAL STATUS: CI - 1%-19% impaired, limited or restricted    - D/C STATUS:  ---------------To be determined---------------    Medical Necessity:   · Patient is expected to demonstrate progress in strength, range of motion and balance to increase ease of functional mobility. Reason for Services/Other Comments:  · Patient continues to require skilled intervention due to achillies tendonitis. Use of outcome tool(s) and clinical judgement create a POC that gives a: Questionable prediction of patient's progress: MODERATE COMPLEXITY            TREATMENT:         (In addition to Assessment/Re-Assessment sessions the following treatments were rendered)  Pre-treatment Symptoms/Complaints Pt states her L foot continues to be swollen. She states the Tubigrip given to her last session helped. Pain: Initial:     4-5/10 L foot Post Session:  3/10 L foot     THERAPEUTIC EXERCISE: (20 minutes):  Exercises per grid below to improve strength. Required minimal verbal and tactile cues to promote proper body alignment. Progressed resistance, range and repetitions as indicated. MANUAL THERAPY: (10 effluarage massage for swelling-light to Left foot and scar tissue massage  minutes): Soft tissue mobilization was utilized and necessary because of the patient's restricted motion of soft tissue.    MODALITIES: (vasocompression ice machine to Left foot for swelling x 10 minutes minutes):      Ice-machine    Date:  4-6-17 Date:  4-10-17 Date:  04-12-17 Date  04-20-17   Activity/Exercise Parameters Parameters Parameters    alphabet x1 x1 1 rep  A-Z 1 rep  A-Z   Towel scrunch x10 Seated toe flexion instead today     Seated dorsiflexion/plantarflexion x20 x20     Nu-Step  X 8 min Level 4 8 min L4 Level 4  8 minutes Level 4  8 minutes   [place]  One set -- 20 reps 20 reps   RedTband all directions Red x 15 reps each way AROM all directions today AROM all directions  20 reps each Red  20 reps each   Light Stretch off step 10 sec hold x 3 reps HEP (gentle) Incline stretch 15sx2 Gastroc Stretch with Towel  3 reps  20 sec holds Incline Stretch  3 reps  20 sec holds   Toe stretch HEP --     Rocker Board Fwd -bwd x 15 reps --     DF-PF  IV ER X 10 each AROM all planes          Treatment/Session Assessment:       · Response to Treatment:   Pt tolerated treatments fairly well. She stated Tubigrip helps, but wasn't wearing it today. Pt encouraged to wear the Tubigrip more during the day if it helps with the swelling. ·   · Compliance with Program/Exercises: Compliant  · Recommendations/Intent for next treatment session: \"Next visit will focus on advancements to more challenging activities\".   Total Treatment Duration:  40 minutes  PT Patient Time In/Time Out  Time In: 0800  Time Out: Adam Mchugh, PT

## 2017-04-24 ENCOUNTER — HOSPITAL ENCOUNTER (OUTPATIENT)
Dept: PHYSICAL THERAPY | Age: 67
Discharge: HOME | End: 2017-04-24
Payer: MEDICARE

## 2017-04-24 PROCEDURE — 97014 ELECTRIC STIMULATION THERAPY: CPT

## 2017-04-24 PROCEDURE — 97110 THERAPEUTIC EXERCISES: CPT

## 2017-04-24 NOTE — PROGRESS NOTES
Jessica Blanchard  : 1950 [facility] [address], [address] [place] [address],[address], [place] [address].  [phone]   [phone]            OUTPATIENT PHYSICAL THERAPY:Daily Note 2017    ICD-10: Treatment Diagnosis: Difficulty in walking, not elsewhere classified (R26.2)  Precautions/Allergies:   Morphine; Nitrofurantoin; Oxycodone-acetaminophen; Propranolol; and Sulfa (sulfonamide antibiotics)   Fall Risk Score: 1 (? 5 = High Risk)  MD Orders: eval and treat MEDICAL/REFERRING DIAGNOSIS:  Achilles tendinitis, left leg [M76.62]  Contracture, left ankle [M24.572]   DATE OF ONSET: 3-16-17  REFERRING PHYSICIAN: Callie Swain MD  RETURN PHYSICIAN APPOINTMENT: unsure date     INITIAL ASSESSMENT:  Ms. Lois De Jesus presents s/p LEFT ACHILLES TENDON REPAIR / Wash Coyk / Sarah Gonzalez / [employer] . she will benefit from skilled PT to assist with pain control and ease of function. PROBLEM LIST (Impacting functional limitations):  1. Decreased Strength  2. Decreased ADL/Functional Activities  3. Decreased Balance  4. Increased Pain  5. Decreased Flexibility/Joint Mobility  6. Decreased [place] with Home Exercise Program INTERVENTIONS PLANNED:  1. Cold  2. Cryotherapy  3. Electrical Stimulation  4. Gait Training  5. Heat  6. Home Exercise Program (HEP)  7. Manual Therapy  8. Range of Motion (ROM)  9. Therapeutic Activites  10. Therapeutic Exercise/Strengthening   TREATMENT PLAN:  Effective Dates: 3-29-17 TO 17. Frequency/Duration: 2 times a week for 8 weeks  GOALS: (Goals have been discussed and agreed upon with patient.)  Short-Term Functional Goals: Time Frame: 4 weeks  1. Pt will be I with phase appropriate HEP to assist with ROM and strength foot. 2. Pt will improve dorsiflexion to 15 degrees to improve length of Achilles involved foot. Discharge Goals: Time Frame: 8 weeks  1.  Pt will improve strength involved foot to demonstrate 10 single leg heel raises in standing. 2. Pt will ambulate with non-antalgic gait pattern. 3. Pt will report ability to walk as long as desired without increased foot pain. Rehabilitation Potential For Stated Goals: Good  Regarding Patric Araujo's therapy, I certify that the treatment plan above will be carried out by a therapist or under their direction. Thank you for this referral,  Adrianna Tavares PTA     Referring Physician Signature: Wilma Ortiz MD              Date                    The information in this section was collected on 3/29/17 (except where otherwise noted). HISTORY:   History of Present Injury/Illness (Reason for Referral):  LEFT ACHILLES TENDON REPAIR / FHL TRANSFER / Mccoy Oscar / Cristine Tomlinson. Pt reports this was all from bone spur. She was just able to get out of the boot on 3/28/17 and is walking now without AD as of 4/24/17. Past Medical History/Comorbidities:   Ms. Juanjo Arboleda  has a past medical history of Acoustic neuroma ([facility] [address] (2011); Basal cell carcinoma; Benign essential tremor; Depression; Environmental allergies; Epiploic appendagitis (Dec 2014); Exposure to TB; Frequent UTI; GERD (gastroesophageal reflux disease); Hiatal hernia (01/26/2017); Hypothyroidism (acquired); Insomnia; Internal hemorrhoids (01/26/2017); Mixed hyperlipidemia; Neurogenic bladder; Osteopenia (2012); PONV (postoperative nausea and vomiting); Psoriasis; Situational anxiety; Symptomatic menopausal or female climacteric states; and Vitamin D deficiency. She also has no past medical history of Adverse effect of anesthesia; Difficult intubation; Malignant hyperthermia due to anesthesia; or Pseudocholinesterase deficiency. Ms. Juanjo Arboleda  has a past surgical history that includes colonoscopy (4/2005 & 1/26/17); malignant skin lesion excision (10/2010); orthopaedic (Right, 8/27/15); orthopaedic (Right, 9/8/15); endoscopy (01/26/2017); and holly and bso (1994).   Social History/Living Environment:     lives in private residence  Prior Level of Function/Work/Activity:  I all activities. Like to walk and bike outdoors. Personal Factors:          Sex:  female        Age:  69 y.o. Current Medications:       Current Outpatient Prescriptions:     [START ON 6/1/2017] clonazePAM (KLONOPIN) 1 mg tablet, Take 1 Tab by mouth nightly. Max Daily Amount: 1 mg. Take 1 tablet po q AM as needed for anxiety & 1.5 tabets po q hs for sleep. Do not fill until 6/1/17 or after., Disp: 75 Tab, Rfl: 2    vilazodone (VIIBRYD) 40 mg tab tablet, Take 1 Tab by mouth every morning., Disp: 30 Tab, Rfl: 5    levomefolate-algal oil (DEPLIN, ALGAL OIL,) 15-90.314 mg cap, Take 1 Cap by mouth daily. , Disp: 90 Cap, Rfl: 3    raNITIdine (ZANTAC) 300 mg tablet, Take 1 Tab by mouth two (2) times daily (after meals). , Disp: 60 Tab, Rfl: 5    Biotin 2,500 mcg cap, Take 1 Tab by mouth as needed. , Disp: , Rfl:     calcium-cholecalciferol, d3, (CALCIUM 600 + D) 600-125 mg-unit tab, Take 1 Tab by mouth two (2) times a day., Disp: , Rfl:     cholecalciferol, VITAMIN D3, (VITAMIN D3) 5,000 unit tab tablet, Take 5,000 Units by mouth every Monday, Wednesday, Friday., Disp: , Rfl:     Cetirizine (ZYRTEC) 10 mg cap, Take 1 Cap by mouth every morning., Disp: , Rfl:     aspirin delayed-release 81 mg tablet, Take 81 mg by mouth nightly., Disp: , Rfl:     FOLIC ACID/MULTIVIT-MIN/LUTEIN (CENTRUM SILVER PO), Take 1 Tab by mouth every morning., Disp: , Rfl:     KRILL OIL PO, Take 1 Tab by mouth daily (with lunch). , Disp: , Rfl:     SYNTHROID 50 mcg tablet, Take 1 Tab by mouth Daily (before breakfast). Brand medically necessary. , Disp: 30 Tab, Rfl: 5    clotrimazole-betamethasone (LOTRISONE) topical cream, Apply to affected area bid, Disp: 15 g, Rfl: 0    Denosumab (PROLIA) 60 mg/mL injection, 1 mL by SubCUTAneous route every 6 months. (Patient taking differently: 60 mg by SubCUTAneous route every 6 months.  Last injection 1/2017), Disp: 1 Syringe, Rfl: 1   Date Last Reviewed:  4/24/2017    Number of Personal Factors/Comorbidities that affect the Plan of Care: 1-2: MODERATE COMPLEXITY   EXAMINATION:   Observation/Orthostatic Postural Assessment:          notable swelling through L foot and ankle. ROM:  With knee bent         R foot:DF 15, PF 60, inv 45, eversion 20        L foot: DF 5, PF 30, inv 45, eversion 15  Strength:          R foot: 5/5 mid range throughout        L foot 4- to 4/5 throughout mid range  Functional Mobility:         Gait/Ambulation:  Use if single crutch, antalgic gait pattern        Stairs:  Not tested today  Balance:          Single leg balance. Unable test L today   Body Structures Involved:  1. Bones  2. Joints  3. Muscles  4. Ligaments Body Functions Affected:  1. Neuromusculoskeletal Activities and Participation Affected:  1. Learning and Applying Knowledge  2. Communication  3. Mobility  4. Self Care  5. Community, Social and [place]   Number of elements (examined above) that affect the Plan of Care: 3: MODERATE COMPLEXITY   CLINICAL PRESENTATION:   Presentation: Evolving clinical presentation with changing clinical characteristics: MODERATE COMPLEXITY   CLINICAL DECISION MAKING:   Outcome Measure: Tool Used: PT/OT FOOT AND ANKLE ABILITY MEASURE  Score:  Initial: 38 Most Recent: X (Date: -- )   Interpretation of Score: For the \"Activities of Daily Living\", there are 21 questions each scored on a 5 point scale with 0 representing \"Unable to do\" and 4 representing \"No difficulty\". The lower the score, the greater the functional disability. 84/84 represents no disability. Minimal detectable change is 5.7 points. With the addition of the 8 questions in the \"Sports Subscale,\" there are 29 questions, each scored on a 5 point scale with 0 representing \"Unable to do\" and 4 representing \"No difficulty\". The lower the score, the greater the functional disability. 116/116 represents no disability.   Minimal detectable change is 12.3 points. Activities of Daily Living:  Score 84 83-68 67-51 50-34 33-18 17-1 0   Modifier CH CI CJ CK CL CM CN     Activities of Daily Living + Sports Subscale:  Score 116 115-94 93-71 70-47 46-24 23-1 0   Modifier CH CI CJ CK CL CM CN     ? Mobility - Walking and Moving Around:     - CURRENT STATUS: CK - 40%-59% impaired, limited or restricted    - GOAL STATUS: CI - 1%-19% impaired, limited or restricted    - D/C STATUS:  ---------------To be determined---------------    Medical Necessity:   · Patient is expected to demonstrate progress in strength, range of motion and balance to increase ease of functional mobility. Reason for Services/Other Comments:  · Patient continues to require skilled intervention due to achillies tendonitis. Use of outcome tool(s) and clinical judgement create a POC that gives a: Questionable prediction of patient's progress: MODERATE COMPLEXITY            TREATMENT:         (In addition to Assessment/Re-Assessment sessions the following treatments were rendered)  Pre-treatment Symptoms/Complaints Same complaints \"swelling\"  Pain: Initial:     4-5/10 L foot Post Session:  3/10 L foot     THERAPEUTIC EXERCISE: (25 minutes):  Exercises per grid below to improve strength. Required minimal verbal and tactile cues to promote proper body alignment. Progressed resistance, range and repetitions as indicated. MODALITIES: (vasocompression ice machine to Left foot for swelling x 10 minutes minutes):      estimulation x 15 minutes for swelling after tx.     Date:  4-6-17 Date:  4-10-17 Date:  04-12-17 Date  04-20-17   Activity/Exercise Parameters Parameters Parameters    alphabet x1 x1 1 rep  A-Z 1 rep  A-Z   Towel scrunch x10 Seated toe flexion instead today  HEP   Standing heel toe raises x20 x20  X 15 reps B   Nu-Step  X 8 min Level 4 8 min L4 Level 4  8 minutes Level 4  8 minutes   [place]  One set -- 20 reps 20 reps   RedTband all directions Red x 15 reps each way AROM all directions today AROM all directions  20 reps each Red  20 reps each   Light Stretch off step 10 sec hold x 3 reps HEP (gentle) Incline stretch 15sx2 Gastroc Stretch with Towel  3 reps  20 sec holds Incline Stretch  3 reps  20 sec holds   Toe stretch HEP --  HEP   Rocker Board Fwd -bwd x 15 reps --  X 20 reps each way   DF-PF  IV ER X 10 each AROM all planes  Against bands x 20 reps        Treatment/Session Assessment:     Patient doing well, swelling noted. She is wearing compression sleeve on Ankle. ROM improved and strength improving overall. Continue plan of care. · Response to Treatment:    · Compliance with Program/Exercises: Compliant  · Recommendations/Intent for next treatment session: \"Next visit will focus on advancements to more challenging activities\".   Total Treatment Duration:  40 minutes  PT Patient Time In/Time Out  Time In: 1115  Time Out: [facility]

## 2017-04-26 ENCOUNTER — APPOINTMENT (OUTPATIENT)
Dept: PHYSICAL THERAPY | Age: 67
End: 2017-04-26
Payer: MEDICARE

## 2017-04-28 ENCOUNTER — HOSPITAL ENCOUNTER (OUTPATIENT)
Dept: PHYSICAL THERAPY | Age: 67
Discharge: HOME | End: 2017-04-28
Payer: MEDICARE

## 2017-04-28 NOTE — PROGRESS NOTES
Jessica Blanchard  : 1950 [facility] [place] [address],[address], [address] [facility]  [phone]   [phone]            OUTPATIENT PHYSICAL THERAPY:Daily Note 2017            Pt cx appointment for today

## 2017-05-01 ENCOUNTER — HOSPITAL ENCOUNTER (OUTPATIENT)
Dept: PHYSICAL THERAPY | Age: 67
Discharge: HOME | End: 2017-05-01
Payer: MEDICARE

## 2017-05-01 PROCEDURE — 97140 MANUAL THERAPY 1/> REGIONS: CPT

## 2017-05-01 PROCEDURE — 97110 THERAPEUTIC EXERCISES: CPT

## 2017-05-01 NOTE — PROGRESS NOTES
Mili Gaytan  : 1950 [facility] at [facility]  [address], [address] [address], [place] [address].  [phone]   [phone]            OUTPATIENT PHYSICAL THERAPY:Progress Report 2017    ICD-10: Treatment Diagnosis: Difficulty in walking, not elsewhere classified (R26.2)  Precautions/Allergies:   Morphine; Nitrofurantoin; Oxycodone-acetaminophen; Propranolol; and Sulfa (sulfonamide antibiotics)   Fall Risk Score: 1 (? 5 = High Risk)  MD Orders: eval and treat MEDICAL/REFERRING DIAGNOSIS:  Achilles tendinitis, left leg [M76.62]  Contracture, left ankle [M24.572]   DATE OF ONSET: 3-16-17  REFERRING PHYSICIAN: Ronald Porter MD  RETURN PHYSICIAN APPOINTMENT: unsure date     INITIAL ASSESSMENT:  Ms. Lili Weinberg presents s/p LEFT ACHILLES TENDON REPAIR / Glenice Paci / Awa Clarissa / Judith Horowitz . she will benefit from continued skilled PT to assist with pain control and ease of function. She is making good progress toward goals. PROBLEM LIST (Impacting functional limitations):  1. Decreased Strength  2. Decreased ADL/Functional Activities  3. Decreased Balance  4. Increased Pain  5. Decreased Flexibility/Joint Mobility  6. Decreased [place] with Home Exercise Program INTERVENTIONS PLANNED:  1. Cold  2. Cryotherapy  3. Electrical Stimulation  4. Gait Training  5. Heat  6. Home Exercise Program (HEP)  7. Manual Therapy  8. Range of Motion (ROM)  9. Therapeutic Activites  10. Therapeutic Exercise/Strengthening   TREATMENT PLAN:  Effective Dates: 3-29-17 TO 17. Frequency/Duration: 2 times a week for 8 weeks  GOALS: (Goals have been discussed and agreed upon with patient.)  Short-Term Functional Goals: Time Frame: 4 weeks  1. Pt will be I with phase appropriate HEP to assist with ROM and strength foot. MET but ONGOING as progresses  2. Pt will improve dorsiflexion to 15 degrees to improve length of Achilles involved foot.  GOAL MET  Discharge Goals: Time Frame: 8 weeks  1. Pt will improve strength involved foot to demonstrate 10 single leg heel raises in standing. ONGOING  2. Pt will ambulate with non-antalgic gait pattern. Still antalgic on occassion  3. Pt will report ability to walk as long as desired without increased foot pain. ONGOING  Rehabilitation Potential For Stated Goals: Good  Regarding Maris Araujo's therapy, I certify that the treatment plan above will be carried out by a therapist or under their direction. Thank you for this referral,  Alejandra Sheriff PT     Referring Physician Signature: Garcia Roy MD              Date                    The information in this section was collected on 3/29/17 (except where otherwise noted). HISTORY:   History of Present Injury/Illness (Reason for Referral):  LEFT ACHILLES TENDON REPAIR / [facility]HL TRANSFER / Kristyn Dawson / Aniyah Soriano. Pt reports this was all from bone spur. She was just able to get out of the boot on 3/28/17 and is walking now without AD as of 4/24/17. Past Medical History/Comorbidities:   Ms. Aruna Dejesus  has a past medical history of Acoustic neuroma ([facility] [address] (2011); Basal cell carcinoma; Benign essential tremor; Depression; Environmental allergies; Epiploic appendagitis (Dec 2014); Exposure to TB; Frequent UTI; GERD (gastroesophageal reflux disease); Hiatal hernia (01/26/2017); Hypothyroidism (acquired); Insomnia; Internal hemorrhoids (01/26/2017); Mixed hyperlipidemia; Neurogenic bladder; Osteopenia (2012); PONV (postoperative nausea and vomiting); Psoriasis; Situational anxiety; Symptomatic menopausal or female climacteric states; and Vitamin D deficiency. She also has no past medical history of Adverse effect of anesthesia; Difficult intubation; Malignant hyperthermia due to anesthesia; or Pseudocholinesterase deficiency.   Ms. Aruna Dejesus  has a past surgical history that includes colonoscopy (4/2005 & 1/26/17); malignant skin lesion excision (10/2010); orthopaedic (Right, 8/27/15); orthopaedic (Right, 9/8/15); endoscopy (01/26/2017); and holly and bso (1994). Social History/Living Environment:     lives in private residence  Prior Level of Function/Work/Activity:  I all activities. Like to walk and bike outdoors. Personal Factors:          Sex:  female        Age:  69 y.o. Current Medications:       Current Outpatient Prescriptions:     [START ON 6/1/2017] clonazePAM (KLONOPIN) 1 mg tablet, Take 1 Tab by mouth nightly. Max Daily Amount: 1 mg. Take 1 tablet po q AM as needed for anxiety & 1.5 tabets po q hs for sleep. Do not fill until 6/1/17 or after., Disp: 75 Tab, Rfl: 2    vilazodone (VIIBRYD) 40 mg tab tablet, Take 1 Tab by mouth every morning., Disp: 30 Tab, Rfl: 5    levomefolate-algal oil (DEPLIN, ALGAL OIL,) 15-90.314 mg cap, Take 1 Cap by mouth daily. , Disp: 90 Cap, Rfl: 3    raNITIdine (ZANTAC) 300 mg tablet, Take 1 Tab by mouth two (2) times daily (after meals). , Disp: 60 Tab, Rfl: 5    Biotin 2,500 mcg cap, Take 1 Tab by mouth as needed. , Disp: , Rfl:     calcium-cholecalciferol, d3, (CALCIUM 600 + D) 600-125 mg-unit tab, Take 1 Tab by mouth two (2) times a day., Disp: , Rfl:     cholecalciferol, VITAMIN D3, (VITAMIN D3) 5,000 unit tab tablet, Take 5,000 Units by mouth every Monday, Wednesday, Friday., Disp: , Rfl:     Cetirizine (ZYRTEC) 10 mg cap, Take 1 Cap by mouth every morning., Disp: , Rfl:     aspirin delayed-release 81 mg tablet, Take 81 mg by mouth nightly., Disp: , Rfl:     FOLIC ACID/MULTIVIT-MIN/LUTEIN (CENTRUM SILVER PO), Take 1 Tab by mouth every morning., Disp: , Rfl:     KRILL OIL PO, Take 1 Tab by mouth daily (with lunch). , Disp: , Rfl:     SYNTHROID 50 mcg tablet, Take 1 Tab by mouth Daily (before breakfast). Brand medically necessary. , Disp: 30 Tab, Rfl: 5    clotrimazole-betamethasone (LOTRISONE) topical cream, Apply to affected area bid, Disp: 15 g, Rfl: 0    Denosumab (PROLIA) 60 mg/mL injection, 1 mL by SubCUTAneous route every 6 months. (Patient taking differently: 60 mg by SubCUTAneous route every 6 months. Last injection 1/2017), Disp: 1 Syringe, Rfl: 1   Date Last Reviewed:  5/1/2017    Number of Personal Factors/Comorbidities that affect the Plan of Care: 1-2: MODERATE COMPLEXITY   EXAMINATION:   Observation/Orthostatic Postural Assessment:          notable swelling through L foot and ankle. ROM:  With knee bent         R foot:DF 15, PF 60, inv 45, eversion 20        L foot: DF 15, PF 50, inv 45, eversion 20  Strength:          R foot: 5/5 mid range throughout        L foot  4/5 throughout mid range  Functional Mobility:         Gait/Ambulation:  Use if single crutch, antalgic gait pattern        Stairs:  Not tested today  Balance:          Single leg balance. Unable test L today   Body Structures Involved:  1. Bones  2. Joints  3. Muscles  4. Ligaments Body Functions Affected:  1. Neuromusculoskeletal Activities and Participation Affected:  1. Learning and Applying Knowledge  2. Communication  3. Mobility  4. Self Care  5. Community, Social and [place]   Number of elements (examined above) that affect the Plan of Care: 3: MODERATE COMPLEXITY   CLINICAL PRESENTATION:   Presentation: Evolving clinical presentation with changing clinical characteristics: MODERATE COMPLEXITY   CLINICAL DECISION MAKING:   Outcome Measure: Tool Used: PT/OT FOOT AND ANKLE ABILITY MEASURE  Score:  Initial: 38 Most Recent: X (Date: -- )   Interpretation of Score: For the \"Activities of Daily Living\", there are 21 questions each scored on a 5 point scale with 0 representing \"Unable to do\" and 4 representing \"No difficulty\". The lower the score, the greater the functional disability. 84/84 represents no disability. Minimal detectable change is 5.7 points. With the addition of the 8 questions in the \"Sports Subscale,\" there are 29 questions, each scored on a 5 point scale with 0 representing \"Unable to do\" and 4 representing \"No difficulty\".   The lower the score, the greater the functional disability. 116/116 represents no disability. Minimal detectable change is 12.3 points. Activities of Daily Living:  Score 84 83-68 67-51 50-34 33-18 17-1 0   Modifier CH CI CJ CK CL CM CN     Activities of Daily Living + Sports Subscale:  Score 116 115-94 93-71 70-47 46-24 23-1 0   Modifier CH CI CJ CK CL CM CN     ? Mobility - Walking and Moving Around:     - CURRENT STATUS: CK - 40%-59% impaired, limited or restricted    - GOAL STATUS: CI - 1%-19% impaired, limited or restricted    - D/C STATUS:  ---------------To be determined---------------    Medical Necessity:   · Patient is expected to demonstrate progress in strength, range of motion and balance to increase ease of functional mobility. Reason for Services/Other Comments:  · Patient continues to require skilled intervention due to achillies tendonitis. Use of outcome tool(s) and clinical judgement create a POC that gives a: Questionable prediction of patient's progress: MODERATE COMPLEXITY            TREATMENT:         (In addition to Assessment/Re-Assessment sessions the following treatments were rendered)  Pre-treatment Symptoms/Complaints Same complaints \"swelling\"  Pain: Initial:     4-5/10 L foot Post Session:  3/10 L foot     THERAPEUTIC EXERCISE: (25 minutes):  Exercises per grid below to improve strength. Required minimal verbal and tactile cues to promote proper body alignment. Progressed resistance, range and repetitions as indicated. MODALITIES: (vasocompression ice machine to Left foot for swelling x 10 minutes minutes):      estimulation x 15 minutes for swelling after tx.     Date:  4-6-17 Date:  4-10-17 Date:  04-12-17 Date  04-20-17   Activity/Exercise Parameters Parameters Parameters    alphabet x1 x1 1 rep  A-Z 1 rep  A-Z   Towel scrunch x10 Seated toe flexion instead today  HEP   Standing heel toe raises x20 x20  X 15 reps B   Nu-Step  X 8 min Level 4 8 min L4 Level 4  8 minutes Level 4  8 minutes   [place]  One set -- 20 reps 20 reps   RedTband all directions Red x 15 reps each way AROM all directions today AROM all directions  20 reps each Red  20 reps each   Light Stretch off step 10 sec hold x 3 reps HEP (gentle) Incline stretch 15sx2 Gastroc Stretch with Towel  3 reps  20 sec holds Incline Stretch  3 reps  20 sec holds   Toe stretch HEP --  HEP   Rocker Board Fwd -bwd x 15 reps --  X 20 reps each way   DF-PF  IV ER X 10 each AROM all planes  Against bands x 20 reps        Treatment/Session Assessment:     Patient doing well, swelling noted. She is wearing compression sleeve on Ankle. ROM improved and strength improving overall. Continue plan of care. · Response to Treatment:    · Compliance with Program/Exercises: Compliant  · Recommendations/Intent for next treatment session: \"Next visit will focus on advancements to more challenging activities\".   Total Treatment Duration:  40 minutes       Isaac Sun, PT

## 2017-05-04 ENCOUNTER — HOSPITAL ENCOUNTER (OUTPATIENT)
Dept: PHYSICAL THERAPY | Age: 67
Discharge: HOME | End: 2017-05-04
Payer: MEDICARE

## 2017-05-08 ENCOUNTER — APPOINTMENT (OUTPATIENT)
Dept: PHYSICAL THERAPY | Age: 67
End: 2017-05-08
Payer: MEDICARE

## 2017-05-11 ENCOUNTER — HOSPITAL ENCOUNTER (OUTPATIENT)
Dept: PHYSICAL THERAPY | Age: 67
Discharge: HOME | End: 2017-05-11
Payer: MEDICARE

## 2017-05-17 ENCOUNTER — HOSPITAL ENCOUNTER (OUTPATIENT)
Dept: PHYSICAL THERAPY | Age: 67
Discharge: HOME | End: 2017-05-17
Payer: MEDICARE

## 2017-05-17 PROCEDURE — 97110 THERAPEUTIC EXERCISES: CPT

## 2017-05-17 PROCEDURE — 97016 VASOPNEUMATIC DEVICE THERAPY: CPT

## 2017-05-17 NOTE — PROGRESS NOTES
Thompson Middleton  : 1950 [facility] at [address] [place] [address], [address] [facility]  [phone]   [phone]            OUTPATIENT PHYSICAL THERAPY:Daily Note and Progress Report 2017    ICD-10: Treatment Diagnosis: Difficulty in walking, not elsewhere classified (R26.2)  Precautions/Allergies:   Morphine; Nitrofurantoin; Oxycodone-acetaminophen; Propranolol; and Sulfa (sulfonamide antibiotics)   Fall Risk Score: 1 (? 5 = High Risk)  MD Orders: eval and treat MEDICAL/REFERRING DIAGNOSIS:  Achilles tendinitis, left leg [M76.62]  Contracture, left ankle [M24.572]   DATE OF ONSET: 3-16-17  REFERRING PHYSICIAN: Jairo Key MD  RETURN PHYSICIAN APPOINTMENT: unsure date     INITIAL ASSESSMENT:  Ms. Alma Reyes presents s/p LEFT ACHILLES TENDON REPAIR / Jj Alas / Mau Iqbal / Bree Jiménez . she will benefit from skilled PT to assist with pain control and ease of function. PROBLEM LIST (Impacting functional limitations):  1. Decreased Strength  2. Decreased ADL/Functional Activities  3. Decreased Balance  4. Increased Pain  5. Decreased Flexibility/Joint Mobility  6. Decreased [place] with Home Exercise Program INTERVENTIONS PLANNED:  1. Cold  2. Cryotherapy  3. Electrical Stimulation  4. Gait Training  5. Heat  6. Home Exercise Program (HEP)  7. Manual Therapy  8. Range of Motion (ROM)  9. Therapeutic Activites  10. Therapeutic Exercise/Strengthening   TREATMENT PLAN:  Effective Dates: 3-29-17 TO 17. Frequency/Duration: 2 times a week for 8 weeks  GOALS: (Goals have been discussed and agreed upon with patient.)  Short-Term Functional Goals: Time Frame: 4 weeks  1. Pt will be I with phase appropriate HEP to assist with ROM and strength foot. 2. Pt will improve dorsiflexion to 15 degrees to improve length of Achilles involved foot. Discharge Goals: Time Frame: 8 weeks  1.  Pt will improve strength involved foot to demonstrate 10 single leg heel raises in standing. 2. Pt will ambulate with non-antalgic gait pattern. 3. Pt will report ability to walk as long as desired without increased foot pain. Rehabilitation Potential For Stated Goals: Good  Regarding Danielle Araujo's therapy, I certify that the treatment plan above will be carried out by a therapist or under their direction. Thank you for this referral,  Salazar Hopson, PT     Referring Physician Signature: Anayeli Tran MD              Date                    The information in this section was collected on 3/29/17 (except where otherwise noted). HISTORY:   History of Present Injury/Illness (Reason for Referral):  LEFT ACHILLES TENDON REPAIR / FHL TRANSFER / Josette Gutiérrez / Dg Saldana. Pt reports this was all from bone spur. She was just able to get out of the boot on 3/28/17 and is walking now without AD as of 4/24/17. Past Medical History/Comorbidities:   Ms. Donna Glasgow  has a past medical history of Acoustic neuroma ([facility] [address] (2011); Basal cell carcinoma; Benign essential tremor; Depression; Environmental allergies; Epiploic appendagitis (Dec 2014); Exposure to TB; Frequent UTI; GERD (gastroesophageal reflux disease); Hiatal hernia (01/26/2017); Hypothyroidism (acquired); Insomnia; Internal hemorrhoids (01/26/2017); Mixed hyperlipidemia; Neurogenic bladder; Osteopenia (2012); PONV (postoperative nausea and vomiting); Psoriasis; Situational anxiety; Symptomatic menopausal or female climacteric states; and Vitamin D deficiency. She also has no past medical history of Adverse effect of anesthesia; Difficult intubation; Malignant hyperthermia due to anesthesia; or Pseudocholinesterase deficiency. Ms. Donna Glasgow  has a past surgical history that includes colonoscopy (4/2005 & 1/26/17); malignant skin lesion excision (10/2010); orthopaedic (Right, 8/27/15); orthopaedic (Right, 9/8/15); endoscopy (01/26/2017); and holly and bso (1994).   Social History/Living Environment: lives in private residence  Prior Level of Function/Work/Activity:  I all activities. Like to walk and bike outdoors. Personal Factors:          Sex:  female        Age:  69 y.o. Current Medications:       Current Outpatient Prescriptions:     [START ON 6/1/2017] clonazePAM (KLONOPIN) 1 mg tablet, Take 1 Tab by mouth nightly. Max Daily Amount: 1 mg. Take 1 tablet po q AM as needed for anxiety & 1.5 tabets po q hs for sleep. Do not fill until 6/1/17 or after., Disp: 75 Tab, Rfl: 2    vilazodone (VIIBRYD) 40 mg tab tablet, Take 1 Tab by mouth every morning., Disp: 30 Tab, Rfl: 5    levomefolate-algal oil (DEPLIN, ALGAL OIL,) 15-90.314 mg cap, Take 1 Cap by mouth daily. , Disp: 90 Cap, Rfl: 3    raNITIdine (ZANTAC) 300 mg tablet, Take 1 Tab by mouth two (2) times daily (after meals). , Disp: 60 Tab, Rfl: 5    Biotin 2,500 mcg cap, Take 1 Tab by mouth as needed. , Disp: , Rfl:     calcium-cholecalciferol, d3, (CALCIUM 600 + D) 600-125 mg-unit tab, Take 1 Tab by mouth two (2) times a day., Disp: , Rfl:     cholecalciferol, VITAMIN D3, (VITAMIN D3) 5,000 unit tab tablet, Take 5,000 Units by mouth every Monday, Wednesday, Friday., Disp: , Rfl:     Cetirizine (ZYRTEC) 10 mg cap, Take 1 Cap by mouth every morning., Disp: , Rfl:     aspirin delayed-release 81 mg tablet, Take 81 mg by mouth nightly., Disp: , Rfl:     FOLIC ACID/MULTIVIT-MIN/LUTEIN (CENTRUM SILVER PO), Take 1 Tab by mouth every morning., Disp: , Rfl:     KRILL OIL PO, Take 1 Tab by mouth daily (with lunch). , Disp: , Rfl:     SYNTHROID 50 mcg tablet, Take 1 Tab by mouth Daily (before breakfast). Brand medically necessary. , Disp: 30 Tab, Rfl: 5    clotrimazole-betamethasone (LOTRISONE) topical cream, Apply to affected area bid, Disp: 15 g, Rfl: 0    Denosumab (PROLIA) 60 mg/mL injection, 1 mL by SubCUTAneous route every 6 months. (Patient taking differently: 60 mg by SubCUTAneous route every 6 months.  Last injection 1/2017), Disp: 1 Syringe, Rfl: 1 Date Last Reviewed:  5/17/2017    Number of Personal Factors/Comorbidities that affect the Plan of Care: 1-2: MODERATE COMPLEXITY   EXAMINATION:   Observation/Orthostatic Postural Assessment:          notable swelling through L foot and ankle. ROM:  With knee bent         R foot:DF 15, PF 60, inv 45, eversion 20        L foot: DF 5, PF 30, inv 45, eversion 15  Strength:          R foot: 5/5 mid range throughout        L foot 4- to 4/5 throughout mid range  Functional Mobility:         Gait/Ambulation:  Use if single crutch, antalgic gait pattern        Stairs:  Not tested today  Balance:          Single leg balance. Unable test L today   Body Structures Involved:  1. Bones  2. Joints  3. Muscles  4. Ligaments Body Functions Affected:  1. Neuromusculoskeletal Activities and Participation Affected:  1. Learning and Applying Knowledge  2. Communication  3. Mobility  4. Self Care  5. Community, Social and [place]   Number of elements (examined above) that affect the Plan of Care: 3: MODERATE COMPLEXITY   CLINICAL PRESENTATION:   Presentation: Evolving clinical presentation with changing clinical characteristics: MODERATE COMPLEXITY   CLINICAL DECISION MAKING:   Outcome Measure: Tool Used: PT/OT FOOT AND ANKLE ABILITY MEASURE  Score:  Initial: 38 Most Recent: X (Date: -- )   Interpretation of Score: For the \"Activities of Daily Living\", there are 21 questions each scored on a 5 point scale with 0 representing \"Unable to do\" and 4 representing \"No difficulty\". The lower the score, the greater the functional disability. 84/84 represents no disability. Minimal detectable change is 5.7 points. With the addition of the 8 questions in the \"Sports Subscale,\" there are 29 questions, each scored on a 5 point scale with 0 representing \"Unable to do\" and 4 representing \"No difficulty\". The lower the score, the greater the functional disability. 116/116 represents no disability.   Minimal detectable change is 12.3 points. Activities of Daily Living:  Score 84 83-68 67-51 50-34 33-18 17-1 0   Modifier CH CI CJ CK CL CM CN     Activities of Daily Living + Sports Subscale:  Score 116 115-94 93-71 70-47 46-24 23-1 0   Modifier CH CI CJ CK CL CM CN     ? Mobility - Walking and Moving Around:     - CURRENT STATUS: CK - 40%-59% impaired, limited or restricted    - GOAL STATUS: CI - 1%-19% impaired, limited or restricted    - D/C STATUS:  ---------------To be determined---------------    Medical Necessity:   · Patient is expected to demonstrate progress in strength, range of motion and balance to increase ease of functional mobility. Reason for Services/Other Comments:  · Patient continues to require skilled intervention due to achillies tendonitis. Use of outcome tool(s) and clinical judgement create a POC that gives a: Questionable prediction of patient's progress: MODERATE COMPLEXITY            TREATMENT:         (In addition to Assessment/Re-Assessment sessions the following treatments were rendered)  Pre-treatment Symptoms/Complaints  Pt saw MD for follow up and he told her that her heel pain is normal.  Pain: Initial:     5/10 L foot Post Session:  3/10 L foot     THERAPEUTIC EXERCISE: (30 minutes):  Exercises per grid below to improve strength. Required minimal verbal and tactile cues to promote proper body alignment. Progressed resistance, range and repetitions as indicated. MODALITIES: (10 minutes):      Game Ready Vasopneumatic Compression with Ice to L foot/ankle x10 minutes to decrease pain and swelling. Skin clear afterwards.     Date:  4-6-17 Date:  4-10-17 Date:  04-12-17 Date  04-20-17 Date  05-17-17   Activity/Exercise Parameters Parameters Parameters     alphabet x1 x1 1 rep  A-Z 1 rep  A-Z    Towel scrunch x10 Seated toe flexion instead today  HEP    Standing heel toe raises x20 x20  X 15 reps B 20 reps   Nu-Step  X 8 min Level 4 8 min L4 Level 4  8 minutes Level 4  8 minutes Level 4  8 minutes   [place]  One set -- 20 reps 20 reps    RedTband all directions Red x 15 reps each way AROM all directions today AROM all directions  20 reps each Red  20 reps each Green  20 reps each   Light Stretch off step 10 sec hold x 3 reps HEP (gentle) Incline stretch 15sx2 Gastroc Stretch with Towel  3 reps  20 sec holds Incline Stretch  3 reps  20 sec holds On Slantboard  3 reps  20 sec holds   Toe stretch HEP --  HEP    Rocker Board Fwd -bwd x 15 reps --  X 20 reps each way FW/BW and Sideways  20 reps each   DF-PF  IV ER X 10 each AROM all planes  Against bands x 20 reps    SLS L LE     5 reps to Fatigue   Heel-to-Toe Walking     4 Laps        Treatment/Session Assessment:     Pt tolerated treatments well. Added some additional exercises today to work on standing balance. Pt doing well overall. Will decrease frequency to once weekly for a couple of weeks, then possibly discharge if still doing well. · Response to Treatment:    · Compliance with Program/Exercises: Compliant  · Recommendations/Intent for next treatment session: \"Next visit will focus on advancements to more challenging activities\".   Total Treatment Duration:  40 minutes  PT Patient Time In/Time Out  Time In: 1030  Time Out: Oseas Blake, PT

## 2017-05-19 ENCOUNTER — APPOINTMENT (OUTPATIENT)
Dept: PHYSICAL THERAPY | Age: 67
End: 2017-05-19
Payer: MEDICARE

## 2017-05-22 ENCOUNTER — APPOINTMENT (OUTPATIENT)
Dept: PHYSICAL THERAPY | Age: 67
End: 2017-05-22
Payer: MEDICARE

## 2017-05-25 ENCOUNTER — HOSPITAL ENCOUNTER (OUTPATIENT)
Dept: PHYSICAL THERAPY | Age: 67
Discharge: HOME | End: 2017-05-25
Payer: MEDICARE

## 2017-05-25 PROCEDURE — 97110 THERAPEUTIC EXERCISES: CPT

## 2017-05-25 PROCEDURE — 97016 VASOPNEUMATIC DEVICE THERAPY: CPT

## 2017-05-25 NOTE — PROGRESS NOTES
Jessica Santo  : 1950 [facility]  [address], [address] [address],[address], [address] [facility]  [phone]   [phone]            OUTPATIENT PHYSICAL THERAPY:Daily Note 2017    ICD-10: Treatment Diagnosis: Difficulty in walking, not elsewhere classified (R26.2)  Precautions/Allergies:   Morphine; Nitrofurantoin; Oxycodone-acetaminophen; Propranolol; and Sulfa (sulfonamide antibiotics)   Fall Risk Score: 1 (? 5 = High Risk)  MD Orders: eval and treat MEDICAL/REFERRING DIAGNOSIS:  Achilles tendinitis, left leg [M76.62]  Contracture, left ankle [M24.572]   DATE OF ONSET: 3-16-17  REFERRING PHYSICIAN: Callie Swain MD  RETURN PHYSICIAN APPOINTMENT: unsure date     INITIAL ASSESSMENT:  Ms. Lois De Jesus presents s/p LEFT ACHILLES TENDON REPAIR / Wash Coyk / Sarah Gonzalez / [employer] . she will benefit from skilled PT to assist with pain control and ease of function. PROBLEM LIST (Impacting functional limitations):  1. Decreased Strength  2. Decreased ADL/Functional Activities  3. Decreased Balance  4. Increased Pain  5. Decreased Flexibility/Joint Mobility  6. Decreased [place] with Home Exercise Program INTERVENTIONS PLANNED:  1. Cold  2. Cryotherapy  3. Electrical Stimulation  4. Gait Training  5. Heat  6. Home Exercise Program (HEP)  7. Manual Therapy  8. Range of Motion (ROM)  9. Therapeutic Activites  10. Therapeutic Exercise/Strengthening   TREATMENT PLAN:  Effective Dates: 3-29-17 TO 17. Frequency/Duration: 2 times a week for 8 weeks  GOALS: (Goals have been discussed and agreed upon with patient.)  Short-Term Functional Goals: Time Frame: 4 weeks  1. Pt will be I with phase appropriate HEP to assist with ROM and strength foot. GOAL MET 17  2. Pt will improve dorsiflexion to 15 degrees to improve length of Achilles involved foot. GOAL MET 17  Discharge Goals: Time Frame: 8 weeks  1.  Pt will improve strength involved foot to demonstrate 10 single leg heel raises in standing. ONOING  2. Pt will ambulate with non-antalgic gait pattern. ONGOING  3. Pt will report ability to walk as long as desired without increased foot pain. ONGOING  Rehabilitation Potential For Stated Goals: Good  Regarding Francine Araujo's therapy, I certify that the treatment plan above will be carried out by a therapist or under their direction. Thank you for this referral,  Isa Peace PT     Referring Physician Signature: Herve Ledesma MD              Date                    The information in this section was collected on 3/29/17 (except where otherwise noted). HISTORY:   History of Present Injury/Illness (Reason for Referral):  LEFT ACHILLES TENDON REPAIR / [employer] TRANSFER / Benetta Shield / Sunny Rondon. Pt reports this was all from bone spur. She was just able to get out of the boot on 3/28/17 and is walking now without AD as of 4/24/17. Past Medical History/Comorbidities:   Ms. Jacqui Navarrete  has a past medical history of Acoustic neuroma ([facility] [address] (2011); Basal cell carcinoma; Benign essential tremor; Depression; Environmental allergies; Epiploic appendagitis (Dec 2014); Exposure to TB; Frequent UTI; GERD (gastroesophageal reflux disease); Hiatal hernia (01/26/2017); Hypothyroidism (acquired); Insomnia; Internal hemorrhoids (01/26/2017); Mixed hyperlipidemia; Neurogenic bladder; Osteopenia (2012); PONV (postoperative nausea and vomiting); Psoriasis; Situational anxiety; Symptomatic menopausal or female climacteric states; and Vitamin D deficiency. She also has no past medical history of Adverse effect of anesthesia; Difficult intubation; Malignant hyperthermia due to anesthesia; or Pseudocholinesterase deficiency.   Ms. Jacqui Navarrete  has a past surgical history that includes colonoscopy (4/2005 & 1/26/17); malignant skin lesion excision (10/2010); orthopaedic (Right, 8/27/15); orthopaedic (Right, 9/8/15); endoscopy (01/26/2017); and holly and bso (1994). Social History/Living Environment:     lives in private residence  Prior Level of Function/Work/Activity:  I all activities. Like to walk and bike outdoors. Personal Factors:          Sex:  female        Age:  69 y.o. Current Medications:       Current Outpatient Prescriptions:     [START ON 6/1/2017] clonazePAM (KLONOPIN) 1 mg tablet, Take 1 Tab by mouth nightly. Max Daily Amount: 1 mg. Take 1 tablet po q AM as needed for anxiety & 1.5 tabets po q hs for sleep. Do not fill until 6/1/17 or after., Disp: 75 Tab, Rfl: 2    vilazodone (VIIBRYD) 40 mg tab tablet, Take 1 Tab by mouth every morning., Disp: 30 Tab, Rfl: 5    levomefolate-algal oil (DEPLIN, ALGAL OIL,) 15-90.314 mg cap, Take 1 Cap by mouth daily. , Disp: 90 Cap, Rfl: 3    raNITIdine (ZANTAC) 300 mg tablet, Take 1 Tab by mouth two (2) times daily (after meals). , Disp: 60 Tab, Rfl: 5    Biotin 2,500 mcg cap, Take 1 Tab by mouth as needed. , Disp: , Rfl:     calcium-cholecalciferol, d3, (CALCIUM 600 + D) 600-125 mg-unit tab, Take 1 Tab by mouth two (2) times a day., Disp: , Rfl:     cholecalciferol, VITAMIN D3, (VITAMIN D3) 5,000 unit tab tablet, Take 5,000 Units by mouth every Monday, Wednesday, Friday., Disp: , Rfl:     Cetirizine (ZYRTEC) 10 mg cap, Take 1 Cap by mouth every morning., Disp: , Rfl:     aspirin delayed-release 81 mg tablet, Take 81 mg by mouth nightly., Disp: , Rfl:     FOLIC ACID/MULTIVIT-MIN/LUTEIN (CENTRUM SILVER PO), Take 1 Tab by mouth every morning., Disp: , Rfl:     KRILL OIL PO, Take 1 Tab by mouth daily (with lunch). , Disp: , Rfl:     SYNTHROID 50 mcg tablet, Take 1 Tab by mouth Daily (before breakfast). Brand medically necessary. , Disp: 30 Tab, Rfl: 5    clotrimazole-betamethasone (LOTRISONE) topical cream, Apply to affected area bid, Disp: 15 g, Rfl: 0    Denosumab (PROLIA) 60 mg/mL injection, 1 mL by SubCUTAneous route every 6 months. (Patient taking differently: 60 mg by SubCUTAneous route every 6 months. Last injection 1/2017), Disp: 1 Syringe, Rfl: 1   Date Last Reviewed:  5/25/2017    Number of Personal Factors/Comorbidities that affect the Plan of Care: 1-2: MODERATE COMPLEXITY   EXAMINATION:   Observation/Orthostatic Postural Assessment:          notable swelling through L foot and ankle. ROM:  With knee bent         R foot:DF 15, PF 60, inv 45, eversion 20        L foot: DF 5, PF 30, inv 45, eversion 15  Strength:          R foot: 5/5 mid range throughout        L foot 4- to 4/5 throughout mid range  Functional Mobility:         Gait/Ambulation:  Use if single crutch, antalgic gait pattern        Stairs:  Not tested today  Balance:          Single leg balance. Unable test L today   Body Structures Involved:  1. Bones  2. Joints  3. Muscles  4. Ligaments Body Functions Affected:  1. Neuromusculoskeletal Activities and Participation Affected:  1. Learning and Applying Knowledge  2. Communication  3. Mobility  4. Self Care  5. Community, Social and [place]   Number of elements (examined above) that affect the Plan of Care: 3: MODERATE COMPLEXITY   CLINICAL PRESENTATION:   Presentation: Evolving clinical presentation with changing clinical characteristics: MODERATE COMPLEXITY   CLINICAL DECISION MAKING:   Outcome Measure: Tool Used: PT/OT FOOT AND ANKLE ABILITY MEASURE  Score:  Initial: 38 Most Recent: X (Date: -- )   Interpretation of Score: For the \"Activities of Daily Living\", there are 21 questions each scored on a 5 point scale with 0 representing \"Unable to do\" and 4 representing \"No difficulty\". The lower the score, the greater the functional disability. 84/84 represents no disability. Minimal detectable change is 5.7 points. With the addition of the 8 questions in the \"Sports Subscale,\" there are 29 questions, each scored on a 5 point scale with 0 representing \"Unable to do\" and 4 representing \"No difficulty\". The lower the score, the greater the functional disability.  116/116 represents no disability. Minimal detectable change is 12.3 points. Activities of Daily Living:  Score 84 83-68 67-51 50-34 33-18 17-1 0   Modifier CH CI CJ CK CL CM CN     Activities of Daily Living + Sports Subscale:  Score 116 115-94 93-71 70-47 46-24 23-1 0   Modifier CH CI CJ CK CL CM CN     ? Mobility - Walking and Moving Around:     - CURRENT STATUS: CK - 40%-59% impaired, limited or restricted    - GOAL STATUS: CI - 1%-19% impaired, limited or restricted    - D/C STATUS:  ---------------To be determined---------------    Medical Necessity:   · Patient is expected to demonstrate progress in strength, range of motion and balance to increase ease of functional mobility. Reason for Services/Other Comments:  · Patient continues to require skilled intervention due to achillies tendonitis. Use of outcome tool(s) and clinical judgement create a POC that gives a: Questionable prediction of patient's progress: MODERATE COMPLEXITY            TREATMENT:         (In addition to Assessment/Re-Assessment sessions the following treatments were rendered)  Pre-treatment Symptoms/Complaints  Pt saw MD for follow up and he told her that her heel pain is normal.  Pain: Initial:     5/10 L foot Post Session:  3/10 L foot     THERAPEUTIC EXERCISE: (30 minutes):  Exercises per grid below to improve strength. Required minimal verbal and tactile cues to promote proper body alignment. Progressed resistance, range and repetitions as indicated. MODALITIES: (10 minutes):      Game Ready Vasopneumatic Compression with Ice to L foot/ankle x10 minutes to decrease pain and swelling. Skin clear afterwards.     Date  05-25-17      Activity/Exercise       alphabet       Towel scrunch       Standing heel toe raises 20 reps      Nu-Step  Level 4  8 minutes      [place]        RedTband all directions Green  20 reps each      Light Stretch off step On Slantboard  3 reps  20 sec holds      Toe stretch       Rocker Board FW/BW and Sideways  20 reps each      DF-PF  IV ER       SLS L LE 5 reps to Fatigue      Heel-to-Toe Walking 4 Laps           Treatment/Session Assessment:     Pt tolerated treatments well. Continuing to work on these new exercise added last visit. · Response to Treatment:    · Compliance with Program/Exercises: Compliant  · Recommendations/Intent for next treatment session: \"Next visit will focus on advancements to more challenging activities\".   Total Treatment Duration:  45 minutes  PT Patient Time In/Time Out  Time In: 1215  Time Out: 1300    Jonnie Barr, PT

## 2017-05-30 ENCOUNTER — APPOINTMENT (OUTPATIENT)
Dept: PHYSICAL THERAPY | Age: 67
End: 2017-05-30
Payer: MEDICARE

## 2017-05-31 ENCOUNTER — HOSPITAL ENCOUNTER (OUTPATIENT)
Dept: PHYSICAL THERAPY | Age: 67
Discharge: HOME | End: 2017-05-31
Payer: MEDICARE

## 2017-05-31 PROCEDURE — 97016 VASOPNEUMATIC DEVICE THERAPY: CPT

## 2017-05-31 PROCEDURE — G8979 MOBILITY GOAL STATUS: HCPCS

## 2017-05-31 PROCEDURE — 97110 THERAPEUTIC EXERCISES: CPT

## 2017-05-31 PROCEDURE — G8978 MOBILITY CURRENT STATUS: HCPCS

## 2017-05-31 NOTE — PROGRESS NOTES
Blas Whitman  : 1950 [facility] at [place] [address], [place] [address].  [phone]   [phone]            OUTPATIENT PHYSICAL THERAPY:Daily Note and Recertification 2591    ICD-10: Treatment Diagnosis: Difficulty in walking, not elsewhere classified (R26.2)  Precautions/Allergies:   Morphine; Nitrofurantoin; Oxycodone-acetaminophen; Propranolol; and Sulfa (sulfonamide antibiotics)   Fall Risk Score: 1 (? 5 = High Risk)  MD Orders: eval and treat MEDICAL/REFERRING DIAGNOSIS:  Achilles tendinitis, left leg [M76.62]  Contracture, left ankle [M24.572]   DATE OF ONSET: 3-16-17  REFERRING PHYSICIAN: Giovanny Guevara MD  RETURN PHYSICIAN APPOINTMENT: unsure date     INITIAL ASSESSMENT:  Ms. Vivian Rashid presents s/p LEFT ACHILLES TENDON REPAIR / Zaina Rodriguez / Denise Bryan / Alma Anderson . she will benefit from skilled PT to assist with pain control and ease of function. PROBLEM LIST (Impacting functional limitations):  1. Decreased Strength  2. Decreased ADL/Functional Activities  3. Decreased Balance  4. Increased Pain  5. Decreased Flexibility/Joint Mobility  6. Decreased [place] with Home Exercise Program INTERVENTIONS PLANNED:  1. Cold  2. Cryotherapy  3. Electrical Stimulation  4. Gait Training  5. Heat  6. Home Exercise Program (HEP)  7. Manual Therapy  8. Range of Motion (ROM)  9. Therapeutic Activites  10. Therapeutic Exercise/Strengthening   TREATMENT PLAN:  Effective Dates: 17 TO 17. Frequency/Duration: 2 times a week for 4 weeks  GOALS: (Goals have been discussed and agreed upon with patient.)  Short-Term Functional Goals: Time Frame: 4 weeks  1. Pt will be I with phase appropriate HEP to assist with ROM and strength foot. GOAL MET 17  2. Pt will improve dorsiflexion to 15 degrees to improve length of Achilles involved foot. GOAL MET 17  Discharge Goals: Time Frame: 8 weeks  1.  Pt will improve strength involved foot to demonstrate 10 single leg heel raises in standing. ONOING  2. Pt will ambulate with non-antalgic gait pattern. ONGOING  3. Pt will report ability to walk as long as desired without increased foot pain. ONGOING  Rehabilitation Potential For Stated Goals: Good  Regarding Marylen Flicker Murray's therapy, I certify that the treatment plan above will be carried out by a therapist or under their direction. Thank you for this referral,  Melinda Trinidad, PT     Referring Physician Signature: Carmen Last MD              Date                    The information in this section was collected on 3/29/17 (except where otherwise noted). HISTORY:   History of Present Injury/Illness (Reason for Referral):  LEFT ACHILLES TENDON REPAIR / FHL TRANSFER / Alex Rai / Haven Truong. Pt reports this was all from bone spur. She was just able to get out of the boot on 3/28/17 and is walking now without AD as of 4/24/17. Past Medical History/Comorbidities:   Ms. Theresa Rollins  has a past medical history of Acoustic neuroma ([facility] [address] (2011); Basal cell carcinoma; Benign essential tremor; Depression; Environmental allergies; Epiploic appendagitis (Dec 2014); Exposure to TB; Frequent UTI; GERD (gastroesophageal reflux disease); Hiatal hernia (01/26/2017); Hypothyroidism (acquired); Insomnia; Internal hemorrhoids (01/26/2017); Mixed hyperlipidemia; Neurogenic bladder; Osteopenia (2012); PONV (postoperative nausea and vomiting); Psoriasis; Situational anxiety; Symptomatic menopausal or female climacteric states; and Vitamin D deficiency. She also has no past medical history of Adverse effect of anesthesia; Difficult intubation; Malignant hyperthermia due to anesthesia; or Pseudocholinesterase deficiency.   Ms. Theresa Rollins  has a past surgical history that includes colonoscopy (4/2005 & 1/26/17); malignant skin lesion excision (10/2010); orthopaedic (Right, 8/27/15); orthopaedic (Right, 9/8/15); endoscopy (01/26/2017); and holly and migdalia (1994). Social History/Living Environment:     lives in private residence  Prior Level of Function/Work/Activity:  I all activities. Like to walk and bike outdoors. Personal Factors:          Sex:  female        Age:  69 y.o. Current Medications:       Current Outpatient Prescriptions:     [START ON 6/1/2017] clonazePAM (KLONOPIN) 1 mg tablet, Take 1 Tab by mouth nightly. Max Daily Amount: 1 mg. Take 1 tablet po q AM as needed for anxiety & 1.5 tabets po q hs for sleep. Do not fill until 6/1/17 or after., Disp: 75 Tab, Rfl: 2    vilazodone (VIIBRYD) 40 mg tab tablet, Take 1 Tab by mouth every morning., Disp: 30 Tab, Rfl: 5    levomefolate-algal oil (DEPLIN, ALGAL OIL,) 15-90.314 mg cap, Take 1 Cap by mouth daily. , Disp: 90 Cap, Rfl: 3    raNITIdine (ZANTAC) 300 mg tablet, Take 1 Tab by mouth two (2) times daily (after meals). , Disp: 60 Tab, Rfl: 5    Biotin 2,500 mcg cap, Take 1 Tab by mouth as needed. , Disp: , Rfl:     calcium-cholecalciferol, d3, (CALCIUM 600 + D) 600-125 mg-unit tab, Take 1 Tab by mouth two (2) times a day., Disp: , Rfl:     cholecalciferol, VITAMIN D3, (VITAMIN D3) 5,000 unit tab tablet, Take 5,000 Units by mouth every Monday, Wednesday, Friday., Disp: , Rfl:     Cetirizine (ZYRTEC) 10 mg cap, Take 1 Cap by mouth every morning., Disp: , Rfl:     aspirin delayed-release 81 mg tablet, Take 81 mg by mouth nightly., Disp: , Rfl:     FOLIC ACID/MULTIVIT-MIN/LUTEIN (CENTRUM SILVER PO), Take 1 Tab by mouth every morning., Disp: , Rfl:     KRILL OIL PO, Take 1 Tab by mouth daily (with lunch). , Disp: , Rfl:     SYNTHROID 50 mcg tablet, Take 1 Tab by mouth Daily (before breakfast). Brand medically necessary. , Disp: 30 Tab, Rfl: 5    clotrimazole-betamethasone (LOTRISONE) topical cream, Apply to affected area bid, Disp: 15 g, Rfl: 0    Denosumab (PROLIA) 60 mg/mL injection, 1 mL by SubCUTAneous route every 6 months.  (Patient taking differently: 60 mg by SubCUTAneous route every 6 months. Last injection 1/2017), Disp: 1 Syringe, Rfl: 1   Date Last Reviewed:  5/31/2017    Number of Personal Factors/Comorbidities that affect the Plan of Care: 1-2: MODERATE COMPLEXITY   EXAMINATION:   Observation/Orthostatic Postural Assessment:          notable swelling through L foot and ankle. ROM:  With knee bent         R foot:DF 15, PF 60, inv 45, eversion 20        L foot: DF 8, PF 40, inv 45, eversion 15  Strength:          R foot: 5/5 mid range throughout        L foot 4/5 throughout mid range  Functional Mobility:         Gait/Ambulation: No assistive device        Stairs:  Not tested today  Balance:          SLS Fair on L   Body Structures Involved:  1. Bones  2. Joints  3. Muscles  4. Ligaments Body Functions Affected:  1. Neuromusculoskeletal Activities and Participation Affected:  1. Learning and Applying Knowledge  2. Communication  3. Mobility  4. Self Care  5. Community, Social and [place]   Number of elements (examined above) that affect the Plan of Care: 3: MODERATE COMPLEXITY   CLINICAL PRESENTATION:   Presentation: Evolving clinical presentation with changing clinical characteristics: MODERATE COMPLEXITY   CLINICAL DECISION MAKING:   Outcome Measure: Tool Used: PT/OT FOOT AND ANKLE ABILITY MEASURE  Score:  Initial: 38 Most Recent: 86 (Date: 05-31-17)   Interpretation of Score: For the \"Activities of Daily Living\", there are 21 questions each scored on a 5 point scale with 0 representing \"Unable to do\" and 4 representing \"No difficulty\". The lower the score, the greater the functional disability. 84/84 represents no disability. Minimal detectable change is 5.7 points. With the addition of the 8 questions in the \"Sports Subscale,\" there are 29 questions, each scored on a 5 point scale with 0 representing \"Unable to do\" and 4 representing \"No difficulty\". The lower the score, the greater the functional disability. 116/116 represents no disability.   Minimal detectable change is 12.3 points. Activities of Daily Living:  Score 84 83-68 67-51 50-34 33-18 17-1 0   Modifier CH CI CJ CK CL CM CN     Activities of Daily Living + Sports Subscale:  Score 116 115-94 93-71 70-47 46-24 23-1 0   Modifier CH CI CJ CK CL CM CN     ? Mobility - Walking and Moving Around:     - CURRENT STATUS: CJ - 20%-39% impaired, limited or restricted    - GOAL STATUS: CI - 1%-19% impaired, limited or restricted    - D/C STATUS:  ---------------To be determined---------------    Medical Necessity:   · Patient is expected to demonstrate progress in strength, range of motion and balance to increase ease of functional mobility. Reason for Services/Other Comments:  · Patient continues to require skilled intervention due to achillies tendonitis. Use of outcome tool(s) and clinical judgement create a POC that gives a: Questionable prediction of patient's progress: MODERATE COMPLEXITY            TREATMENT:         (In addition to Assessment/Re-Assessment sessions the following treatments were rendered)  Pre-treatment Symptoms/Complaints  Pt reports continued swelling, especially later in the day. Pain: Initial:     3/10 L foot Post Session:  3/10 L foot     THERAPEUTIC EXERCISE: (30 minutes):  Exercises per grid below to improve strength. Required minimal verbal and tactile cues to promote proper body alignment. Progressed resistance, range and repetitions as indicated. MODALITIES: (10 minutes):      Game Ready Vasopneumatic Compression with Ice to L foot/ankle x10 minutes to decrease pain and swelling. Skin clear afterwards.     Date  05-25-17 Date  05-31-17     Activity/Exercise       alphabet       Towel scrunch       Standing heel toe raises 20 reps 20 reps     Nu-Step  Level 4  8 minutes Level 4  8 minutes     [place]        RedTband all directions Green  20 reps each Green  20 reps each     Light Stretch off step On Slantboard  3 reps  20 sec holds On Slantboard  3 reps  20 sec holds Toe stretch       Rocker Board FW/BW and Sideways  20 reps each FW/BW and Sideways  20 reps each     DF-PF  IV ER       SLS L LE 5 reps to Fatigue 5 reps to Fatigue     Heel-to-Toe Walking 4 Laps 4 Laps          Treatment/Session Assessment:     Pt tolerated treatments well. Plan to continue for a couple more visits, then discharge to independent [facility]. · Response to Treatment:    · Compliance with Program/Exercises: Compliant  · Recommendations/Intent for next treatment session: \"Next visit will focus on advancements to more challenging activities\".   Total Treatment Duration:  40 minutes  PT Patient Time In/Time Out  Time In: 0930  Time Out: [address]  Renny Jennings, PT

## 2017-06-01 ENCOUNTER — APPOINTMENT (OUTPATIENT)
Dept: PHYSICAL THERAPY | Age: 67
End: 2017-06-01
Payer: MEDICARE

## 2017-06-15 ENCOUNTER — HOSPITAL ENCOUNTER (OUTPATIENT)
Dept: PHYSICAL THERAPY | Age: 67
Discharge: HOME | End: 2017-06-15
Payer: MEDICARE

## 2017-06-15 PROCEDURE — 97110 THERAPEUTIC EXERCISES: CPT

## 2017-06-15 PROCEDURE — G8978 MOBILITY CURRENT STATUS: HCPCS

## 2017-06-15 PROCEDURE — G8980 MOBILITY D/C STATUS: HCPCS

## 2017-06-15 PROCEDURE — 97016 VASOPNEUMATIC DEVICE THERAPY: CPT

## 2017-06-15 PROCEDURE — G8979 MOBILITY GOAL STATUS: HCPCS

## 2017-06-15 PROCEDURE — 97140 MANUAL THERAPY 1/> REGIONS: CPT

## 2017-06-15 NOTE — PROGRESS NOTES
Elsi Garciau  : 1950 [facility]  [address], [address] [address],[address], [facility] 91.  [phone]   [phone]            OUTPATIENT PHYSICAL THERAPY:Daily Note and Discharge 6/15/2017    ICD-10: Treatment Diagnosis: Difficulty in walking, not elsewhere classified (R26.2)  Precautions/Allergies:   Morphine; Nitrofurantoin; Oxycodone-acetaminophen; Propranolol; and Sulfa (sulfonamide antibiotics)   Fall Risk Score: 1 (? 5 = High Risk)  MD Orders: eval and treat MEDICAL/REFERRING DIAGNOSIS:  Achilles tendinitis, left leg [M76.62]  Contracture, left ankle [M24.572]   DATE OF ONSET: 3-16-17  REFERRING PHYSICIAN: Marianela Brown MD  RETURN PHYSICIAN APPOINTMENT: unsure date     INITIAL ASSESSMENT:  Ms. Isabella Peterson presents s/p LEFT ACHILLES TENDON REPAIR / Shah Spruce / Barbra Wander / Julius Xiong . She has great recovery of ROM and strength L foot. She is lacking a little gastroc strength still, but is performing exercise to dress this one her own daily. She is still having some pain and swelling L achillies region, but shes been educated on icing, wearing compression brace, activity pacing. She will DC today I with HEP. PROBLEM LIST (Impacting functional limitations):  1. Decreased Strength  2. Decreased ADL/Functional Activities  3. Decreased Balance  4. Increased Pain  5. Decreased Flexibility/Joint Mobility  6. Decreased Lamar with Home Exercise Program INTERVENTIONS PLANNED:  1. Cold  2. Cryotherapy  3. Electrical Stimulation  4. Gait Training  5. Heat  6. Home Exercise Program (HEP)  7. Manual Therapy  8. Range of Motion (ROM)  9. Therapeutic Activites  10. Therapeutic Exercise/Strengthening   TREATMENT PLAN:  Effective Dates: 17 TO 17. Frequency/Duration: DC today  GOALS: (Goals have been discussed and agreed upon with patient.)  Short-Term Functional Goals: Time Frame: 4 weeks  1.  Pt will be I with phase appropriate HEP to assist with ROM and strength foot. GOAL MET 5-25-17  2. Pt will improve dorsiflexion to 15 degrees to improve length of Achilles involved foot. GOAL MET 5-25-17  Discharge Goals: Time Frame: 8 weeks  1. Pt will improve strength involved foot to demonstrate 10 single leg heel raises in standing. UNMET, but I with HEP  2. Pt will ambulate with non-antalgic gait pattern. GOAL MET  3. Pt will report ability to walk as long as desired without increased foot pain. UNMET, but improving still  Rehabilitation Potential For Stated Goals: Good  Regarding Shantell Araujo's therapy, I certify that the treatment plan above will be carried out by a therapist or under their direction. Thank you for this referral,  Emmanuel Burks, PT     Referring Physician Signature: Yasmine Manjarrez MD              Date                    The information in this section was collected on 3/29/17 (except where otherwise noted). HISTORY:   History of Present Injury/Illness (Reason for Referral):  LEFT ACHILLES TENDON REPAIR / [employer] TRANSFER / Dandre Vance / Lm Sweeney. Pt reports this was all from bone spur. She was just able to get out of the boot on 3/28/17 and is walking now without AD as of 4/24/17. Past Medical History/Comorbidities:   Ms. Jovanni Sam  has a past medical history of Acoustic neuroma ([facility] [address] (2011); Basal cell carcinoma; Benign essential tremor; Depression; Environmental allergies; Epiploic appendagitis (Dec 2014); Exposure to TB; Frequent UTI; GERD (gastroesophageal reflux disease); Hiatal hernia (01/26/2017); Hypothyroidism (acquired); Insomnia; Internal hemorrhoids (01/26/2017); Mixed hyperlipidemia; Neurogenic bladder; Osteopenia (2012); PONV (postoperative nausea and vomiting); Psoriasis; Situational anxiety; Symptomatic menopausal or female climacteric states; and Vitamin D deficiency. She also has no past medical history of Adverse effect of anesthesia;  Difficult intubation; Malignant hyperthermia due to anesthesia; or Pseudocholinesterase deficiency. Ms. Murali Daugherty  has a past surgical history that includes colonoscopy (4/2005 & 1/26/17); malignant skin lesion excision (10/2010); orthopaedic (Right, 8/27/15); orthopaedic (Right, 9/8/15); endoscopy (01/26/2017); and holly and bso (1994). Social History/Living Environment:     lives in private residence  Prior Level of Function/Work/Activity:  I all activities. Like to walk and bike outdoors. Personal Factors:          Sex:  female        Age:  69 y.o. Current Medications:       Current Outpatient Prescriptions:     clonazePAM (KLONOPIN) 1 mg tablet, Take 1 Tab by mouth nightly. Max Daily Amount: 1 mg. Take 1 tablet po q AM as needed for anxiety & 1.5 tabets po q hs for sleep. Do not fill until 6/1/17 or after., Disp: 75 Tab, Rfl: 2    vilazodone (VIIBRYD) 40 mg tab tablet, Take 1 Tab by mouth every morning., Disp: 30 Tab, Rfl: 5    levomefolate-algal oil (DEPLIN, ALGAL OIL,) 15-90.314 mg cap, Take 1 Cap by mouth daily. , Disp: 90 Cap, Rfl: 3    raNITIdine (ZANTAC) 300 mg tablet, Take 1 Tab by mouth two (2) times daily (after meals). , Disp: 60 Tab, Rfl: 5    Biotin 2,500 mcg cap, Take 1 Tab by mouth as needed. , Disp: , Rfl:     calcium-cholecalciferol, d3, (CALCIUM 600 + D) 600-125 mg-unit tab, Take 1 Tab by mouth two (2) times a day., Disp: , Rfl:     cholecalciferol, VITAMIN D3, (VITAMIN D3) 5,000 unit tab tablet, Take 5,000 Units by mouth every Monday, Wednesday, Friday., Disp: , Rfl:     Cetirizine (ZYRTEC) 10 mg cap, Take 1 Cap by mouth every morning., Disp: , Rfl:     aspirin delayed-release 81 mg tablet, Take 81 mg by mouth nightly., Disp: , Rfl:     FOLIC ACID/MULTIVIT-MIN/LUTEIN (CENTRUM SILVER PO), Take 1 Tab by mouth every morning., Disp: , Rfl:     KRILL OIL PO, Take 1 Tab by mouth daily (with lunch). , Disp: , Rfl:     SYNTHROID 50 mcg tablet, Take 1 Tab by mouth Daily (before breakfast). Brand medically necessary. , Disp: 30 Tab, Rfl: 5   clotrimazole-betamethasone (LOTRISONE) topical cream, Apply to affected area bid, Disp: 15 g, Rfl: 0    Denosumab (PROLIA) 60 mg/mL injection, 1 mL by SubCUTAneous route every 6 months. (Patient taking differently: 60 mg by SubCUTAneous route every 6 months. Last injection 1/2017), Disp: 1 Syringe, Rfl: 1   Date Last Reviewed:  6/15/2017    Number of Personal Factors/Comorbidities that affect the Plan of Care: 1-2: MODERATE COMPLEXITY   EXAMINATION:   Observation/Orthostatic Postural Assessment:          notable swelling through L foot and ankle. ROM:  With knee bent         R foot:DF 15, PF 60, inv 45, eversion 20        L foot: DF 15, PF 60, inv 45, eversion 20  Strength:          R foot: 5/5 mid range throughout        L foot 5/5 throughout mid range  Functional Mobility:         Gait/Ambulation: No assistive device        Stairs:  reciprocal stairs with 1 HR  Balance:          SLS Fair on L   Body Structures Involved:  1. Bones  2. Joints  3. Muscles  4. Ligaments Body Functions Affected:  1. Neuromusculoskeletal Activities and Participation Affected:  1. Learning and Applying Knowledge  2. Communication  3. Mobility  4. Self Care  5. Community, Social and [place]   Number of elements (examined above) that affect the Plan of Care: 3: MODERATE COMPLEXITY   CLINICAL PRESENTATION:   Presentation: Evolving clinical presentation with changing clinical characteristics: MODERATE COMPLEXITY   CLINICAL DECISION MAKING:   Outcome Measure: Tool Used: PT/OT FOOT AND ANKLE ABILITY MEASURE  Score:  Initial: 38 Most Recent: 86 (Date: 06-15-17)   Interpretation of Score: For the \"Activities of Daily Living\", there are 21 questions each scored on a 5 point scale with 0 representing \"Unable to do\" and 4 representing \"No difficulty\". The lower the score, the greater the functional disability. 84/84 represents no disability. Minimal detectable change is 5.7 points.   With the addition of the 8 questions in the \"Sports Subscale,\" there are 29 questions, each scored on a 5 point scale with 0 representing \"Unable to do\" and 4 representing \"No difficulty\". The lower the score, the greater the functional disability. 116/116 represents no disability. Minimal detectable change is 12.3 points. Activities of Daily Living:  Score 84 83-68 67-51 50-34 33-18 17-1 0   Modifier CH CI CJ CK CL CM CN     Activities of Daily Living + Sports Subscale:  Score 116 115-94 93-71 70-47 46-24 23-1 0   Modifier CH CI CJ CK CL CM CN     ? Mobility - Walking and Moving Around:     - CURRENT STATUS: CJ - 20%-39% impaired, limited or restricted    - GOAL STATUS: CI - 1%-19% impaired, limited or restricted    - D/C STATUS:  CJ - 20%-39% impaired, limited or restricted       Use of outcome tool(s) and clinical judgement create a POC that gives a: Questionable prediction of patient's progress: MODERATE COMPLEXITY            TREATMENT:         (In addition to Assessment/Re-Assessment sessions the following treatments were rendered)  Pre-treatment Symptoms/Complaints  Pt reports continued swelling, especially later in the day. Pain: Initial:     3/10 L foot Post Session:  3/10 L foot     THERAPEUTIC EXERCISE: (20 minutes):  Exercises per grid below to improve mobility and strength. Required minimal verbal and tactile cues to promote proper body alignment. Progressed resistance, range and repetitions as indicated. MANUAL THERAPY: (10 minutes): Joint mobilization and Soft tissue mobilization was utilized and necessary because of the patient's swelling. MODALITIES: (10 minutes):      Game Ready Vasopneumatic Compression with Ice to L foot/ankle x10 minutes to decrease pain and swelling. Skin clear afterwards.     Date  06-15-17   Activity/Exercise    Towel scrunch L x20   Standing heel toe raises 20 reps   Nu-Step  Level 4  8 minutes   [place]     RedTband all directions Green  20 reps each   Light Stretch off step On Slantboard  3 reps  20 sec holds   Heel raise with R toes only touch L foot flat x20   SLS L LE 5 reps to Fatigue   Heel-to-Toe Walking 4 Laps        Treatment/Session Assessment:     Pt tolerated treatments well.   DC from PT today       Dimas Rick, PT

## 2017-08-02 ENCOUNTER — APPOINTMENT (OUTPATIENT)
Dept: INFUSION THERAPY | Age: 67
End: 2017-08-02

## 2017-08-14 ENCOUNTER — APPOINTMENT (OUTPATIENT)
Dept: INFUSION THERAPY | Age: 67
End: 2017-08-14

## 2017-11-28 ENCOUNTER — HOSPITAL ENCOUNTER (OUTPATIENT)
Dept: GENERAL RADIOLOGY | Age: 67
Discharge: HOME | End: 2017-11-28
Attending: PHYSICIAN ASSISTANT
Payer: MEDICARE

## 2017-11-28 DIAGNOSIS — R05.3 PERSISTENT COUGH FOR 3 WEEKS OR LONGER: ICD-10-CM

## 2017-11-28 PROCEDURE — 71020 XR CHEST PA LAT: CPT

## 2018-02-06 ENCOUNTER — HOSPITAL ENCOUNTER (OUTPATIENT)
Dept: GENERAL RADIOLOGY | Age: 68
Discharge: HOME | End: 2018-02-06
Attending: PHYSICIAN ASSISTANT
Payer: MEDICARE

## 2018-02-06 DIAGNOSIS — M53.3 TAIL BONE PAIN: ICD-10-CM

## 2018-02-06 PROCEDURE — 72220 X-RAY EXAM SACRUM TAILBONE: CPT

## 2018-02-06 NOTE — PROGRESS NOTES
There appears to be an area of her spine at L5 that is butting up against the sacrum which is likely causing her pain.   I'm going to refer her to ortho spine and see what they think and/or suggest.

## 2018-02-12 ENCOUNTER — HOSPITAL ENCOUNTER (OUTPATIENT)
Dept: INFUSION THERAPY | Age: 68
Discharge: HOME | End: 2018-02-12
Payer: MEDICARE

## 2018-02-12 VITALS
SYSTOLIC BLOOD PRESSURE: 121 MMHG | BODY MASS INDEX: 19.97 KG/M2 | WEIGHT: 135.2 LBS | DIASTOLIC BLOOD PRESSURE: 53 MMHG | OXYGEN SATURATION: 95 % | HEART RATE: 69 BPM | RESPIRATION RATE: 18 BRPM | TEMPERATURE: 98.1 F

## 2018-02-12 LAB
CALCIUM SERPL-MCNC: 9.2 MG/DL (ref 8.3–10.4)
CREAT SERPL-MCNC: 1.16 MG/DL (ref 0.6–1)

## 2018-02-12 PROCEDURE — 82310 ASSAY OF CALCIUM: CPT

## 2018-02-12 PROCEDURE — 36415 COLL VENOUS BLD VENIPUNCTURE: CPT

## 2018-02-12 PROCEDURE — 82565 ASSAY OF CREATININE: CPT

## 2018-02-12 PROCEDURE — 74011250636 HC RX REV CODE- 250/636: Performed by: PHYSICIAN ASSISTANT

## 2018-02-12 PROCEDURE — 96372 THER/PROPH/DIAG INJ SC/IM: CPT

## 2018-02-12 RX ADMIN — DENOSUMAB 60 MG: 60 INJECTION SUBCUTANEOUS at 14:20

## 2018-02-12 NOTE — PROGRESS NOTES
Arrived to the [facility]. Prolia completed. Patient tolerated well. Any issues or concerns during appointment: no.  Patient aware of next infusion appointment on 8/13 (date) at 80 (time). Discharged home.

## 2018-05-25 ENCOUNTER — HOSPITAL ENCOUNTER (OUTPATIENT)
Dept: MRI IMAGING | Age: 68
Discharge: HOME | End: 2018-05-25
Payer: MEDICARE

## 2018-05-25 DIAGNOSIS — M53.3 COCCYGEAL PAIN: ICD-10-CM

## 2018-05-25 PROCEDURE — 72195 MRI PELVIS W/O DYE: CPT

## 2018-06-12 ENCOUNTER — HOSPITAL ENCOUNTER (OUTPATIENT)
Dept: MAMMOGRAPHY | Age: 68
Discharge: HOME | End: 2018-06-12
Attending: PHYSICIAN ASSISTANT
Payer: MEDICARE

## 2018-06-12 DIAGNOSIS — Z12.31 ENCOUNTER FOR SCREENING MAMMOGRAM FOR BREAST CANCER: ICD-10-CM

## 2018-06-12 PROCEDURE — 77067 SCR MAMMO BI INCL CAD: CPT

## 2018-08-13 ENCOUNTER — HOSPITAL ENCOUNTER (OUTPATIENT)
Dept: INFUSION THERAPY | Age: 68
Discharge: HOME | End: 2018-08-13
Payer: MEDICARE

## 2018-08-13 VITALS
RESPIRATION RATE: 16 BRPM | TEMPERATURE: 98.1 F | HEART RATE: 78 BPM | WEIGHT: 135 LBS | OXYGEN SATURATION: 97 % | SYSTOLIC BLOOD PRESSURE: 112 MMHG | DIASTOLIC BLOOD PRESSURE: 59 MMHG | BODY MASS INDEX: 19.94 KG/M2

## 2018-08-13 LAB
CALCIUM SERPL-MCNC: 9 MG/DL (ref 8.3–10.4)
CREAT SERPL-MCNC: 1.13 MG/DL (ref 0.6–1)

## 2018-08-13 PROCEDURE — 36415 COLL VENOUS BLD VENIPUNCTURE: CPT

## 2018-08-13 PROCEDURE — 82310 ASSAY OF CALCIUM: CPT

## 2018-08-13 PROCEDURE — 96372 THER/PROPH/DIAG INJ SC/IM: CPT

## 2018-08-13 PROCEDURE — 74011250636 HC RX REV CODE- 250/636: Performed by: PHYSICIAN ASSISTANT

## 2018-08-13 PROCEDURE — 82565 ASSAY OF CREATININE: CPT

## 2018-08-13 RX ADMIN — DENOSUMAB 60 MG: 60 INJECTION SUBCUTANEOUS at 13:50

## 2018-10-10 PROBLEM — R06.02 SHORTNESS OF BREATH: Status: ACTIVE | Noted: 2018-10-10

## 2018-10-10 PROBLEM — R07.89 CHEST TIGHTNESS: Status: ACTIVE | Noted: 2018-10-10

## 2018-10-31 PROBLEM — C43.71 MALIGNANT MELANOMA OF RIGHT LOWER EXTREMITY INCLUDING HIP (HCC): Status: ACTIVE | Noted: 2018-10-19

## 2019-01-29 ENCOUNTER — HOSPITAL ENCOUNTER (OUTPATIENT)
Dept: GENERAL RADIOLOGY | Age: 69
Discharge: HOME | End: 2019-01-29
Payer: MEDICARE

## 2019-01-29 ENCOUNTER — HOSPITAL ENCOUNTER (OUTPATIENT)
Dept: LAB | Age: 69
Discharge: HOME | End: 2019-01-29
Payer: MEDICARE

## 2019-01-29 DIAGNOSIS — R06.02 SOB (SHORTNESS OF BREATH): ICD-10-CM

## 2019-01-29 DIAGNOSIS — R06.02 SHORTNESS OF BREATH: ICD-10-CM

## 2019-01-29 LAB — TSH W FREE THYROID I,TSHELE: 0.97 UIU/ML (ref 0.36–3.74)

## 2019-01-29 PROCEDURE — 36415 COLL VENOUS BLD VENIPUNCTURE: CPT

## 2019-01-29 PROCEDURE — 71046 X-RAY EXAM CHEST 2 VIEWS: CPT

## 2019-01-29 PROCEDURE — 84443 ASSAY THYROID STIM HORMONE: CPT

## 2019-01-29 NOTE — PROGRESS NOTES
Please call pt and let her know that her thyroid function is normal. Also, please call the lab and find out why they didn't run a CBC too and if they can add it on to the blood drawn today. Thank you!

## 2019-01-29 NOTE — PROGRESS NOTES
Pt contacted with normal thyroid function results. Also.  I call her back, let VM, to let her know that Jasmyn Torres will contact her in 12-24 hours to schedule her methocholine test.

## 2019-02-15 ENCOUNTER — HOSPITAL ENCOUNTER (OUTPATIENT)
Dept: LAB | Age: 69
Discharge: HOME | End: 2019-02-15
Payer: MEDICARE

## 2019-02-15 DIAGNOSIS — R07.89 CHEST TIGHTNESS: ICD-10-CM

## 2019-02-15 DIAGNOSIS — E78.2 MIXED HYPERLIPIDEMIA: ICD-10-CM

## 2019-02-15 DIAGNOSIS — R06.02 SHORTNESS OF BREATH: ICD-10-CM

## 2019-02-15 LAB
ANION GAP SERPL CALC-SCNC: 6 MMOL/L
BASOPHILS # BLD: 0.1 K/UL (ref 0–0.2)
BASOPHILS NFR BLD: 1 % (ref 0–2)
BUN SERPL-MCNC: 19 MG/DL (ref 8–23)
CALCIUM SERPL-MCNC: 8.8 MG/DL (ref 8.3–10.4)
CHLORIDE SERPL-SCNC: 107 MMOL/L (ref 98–107)
CO2 SERPL-SCNC: 29 MMOL/L (ref 21–32)
CREAT SERPL-MCNC: 1.1 MG/DL (ref 0.6–1)
DIFFERENTIAL METHOD BLD: ABNORMAL
EOSINOPHIL # BLD: 0.1 K/UL (ref 0–0.8)
EOSINOPHIL NFR BLD: 1 % (ref 0.5–7.8)
ERYTHROCYTE [DISTWIDTH] IN BLOOD BY AUTOMATED COUNT: 13.8 % (ref 11.9–14.6)
GLUCOSE SERPL-MCNC: 81 MG/DL (ref 65–100)
HCT VFR BLD AUTO: 40.2 % (ref 35.8–46.3)
HGB BLD-MCNC: 14.1 G/DL (ref 11.7–15.4)
IMM GRANULOCYTES # BLD AUTO: 0 K/UL (ref 0–0.5)
IMM GRANULOCYTES NFR BLD AUTO: 0 % (ref 0–5)
INR PPP: 1.1
LYMPHOCYTES # BLD: 2.4 K/UL (ref 0.5–4.6)
LYMPHOCYTES NFR BLD: 41 % (ref 13–44)
MCH RBC QN AUTO: 32.4 PG (ref 26.1–32.9)
MCHC RBC AUTO-ENTMCNC: 35.1 G/DL (ref 31.4–35)
MCV RBC AUTO: 92.4 FL (ref 79.6–97.8)
MONOCYTES # BLD: 0.6 K/UL (ref 0.1–1.3)
MONOCYTES NFR BLD: 10 % (ref 4–12)
NEUTS SEG # BLD: 2.8 K/UL (ref 1.7–8.2)
NEUTS SEG NFR BLD: 47 % (ref 43–78)
NRBC # BLD: 0 K/UL (ref 0–0.2)
PLATELET # BLD AUTO: 187 K/UL (ref 150–450)
PMV BLD AUTO: 11.1 FL (ref 9.4–12.3)
POTASSIUM SERPL-SCNC: 3.8 MMOL/L (ref 3.5–5.1)
PROTHROMBIN TIME: 13.5 SEC (ref 11.7–14.5)
RBC # BLD AUTO: 4.35 M/UL (ref 4.05–5.2)
SODIUM SERPL-SCNC: 142 MMOL/L (ref 136–145)
WBC # BLD AUTO: 6 K/UL (ref 4.3–11.1)

## 2019-02-15 PROCEDURE — 85610 PROTHROMBIN TIME: CPT

## 2019-02-15 PROCEDURE — 36415 COLL VENOUS BLD VENIPUNCTURE: CPT

## 2019-02-15 PROCEDURE — 85025 COMPLETE CBC W/AUTO DIFF WBC: CPT

## 2019-02-15 PROCEDURE — 80048 BASIC METABOLIC PNL TOTAL CA: CPT

## 2019-02-21 NOTE — PROGRESS NOTES
Called to pre-assess for [facility] poss with Dr Catie Carter , Scheduled 2/22/19. No answer & message left.

## 2019-02-22 ENCOUNTER — HOSPITAL ENCOUNTER (OUTPATIENT)
Dept: CARDIAC CATH/INVASIVE PROCEDURES | Age: 69
Discharge: HOME | End: 2019-02-22
Attending: INTERNAL MEDICINE | Admitting: INTERNAL MEDICINE
Payer: MEDICARE

## 2019-02-22 VITALS
SYSTOLIC BLOOD PRESSURE: 103 MMHG | TEMPERATURE: 98.1 F | RESPIRATION RATE: 16 BRPM | HEART RATE: 65 BPM | DIASTOLIC BLOOD PRESSURE: 55 MMHG | OXYGEN SATURATION: 94 % | WEIGHT: 132 LBS | HEIGHT: 64 IN | BODY MASS INDEX: 22.53 KG/M2

## 2019-02-22 LAB
ANION GAP SERPL CALC-SCNC: 7 MMOL/L (ref 7–16)
ATRIAL RATE: 62 BPM
BUN SERPL-MCNC: 21 MG/DL (ref 8–23)
CALCIUM SERPL-MCNC: 8.5 MG/DL (ref 8.3–10.4)
CALCULATED P AXIS, ECG09: 71 DEGREES
CALCULATED R AXIS, ECG10: 67 DEGREES
CALCULATED T AXIS, ECG11: 55 DEGREES
CHLORIDE SERPL-SCNC: 110 MMOL/L (ref 98–107)
CO2 SERPL-SCNC: 27 MMOL/L (ref 21–32)
CREAT SERPL-MCNC: 1.03 MG/DL (ref 0.6–1)
DIAGNOSIS, 93000: NORMAL
GLUCOSE SERPL-MCNC: 104 MG/DL (ref 65–100)
P-R INTERVAL, ECG05: 158 MS
POTASSIUM SERPL-SCNC: 4.1 MMOL/L (ref 3.5–5.1)
Q-T INTERVAL, ECG07: 418 MS
QRS DURATION, ECG06: 84 MS
QTC CALCULATION (BEZET), ECG08: 424 MS
SODIUM SERPL-SCNC: 144 MMOL/L (ref 136–145)
VENTRICULAR RATE, ECG03: 62 BPM

## 2019-02-22 PROCEDURE — 77030015766

## 2019-02-22 PROCEDURE — C1769 GUIDE WIRE: HCPCS

## 2019-02-22 PROCEDURE — 74011250636 HC RX REV CODE- 250/636: Performed by: INTERNAL MEDICINE

## 2019-02-22 PROCEDURE — 74011250636 HC RX REV CODE- 250/636

## 2019-02-22 PROCEDURE — 74011636320 HC RX REV CODE- 636/320: Performed by: INTERNAL MEDICINE

## 2019-02-22 PROCEDURE — 74011000250 HC RX REV CODE- 250: Performed by: INTERNAL MEDICINE

## 2019-02-22 PROCEDURE — 74011250637 HC RX REV CODE- 250/637: Performed by: INTERNAL MEDICINE

## 2019-02-22 PROCEDURE — 77030019569 HC BND COMPR RAD TERU -B

## 2019-02-22 PROCEDURE — 93005 ELECTROCARDIOGRAM TRACING: CPT | Performed by: INTERNAL MEDICINE

## 2019-02-22 PROCEDURE — C1894 INTRO/SHEATH, NON-LASER: HCPCS

## 2019-02-22 PROCEDURE — 99152 MOD SED SAME PHYS/QHP 5/>YRS: CPT

## 2019-02-22 PROCEDURE — 93458 L HRT ARTERY/VENTRICLE ANGIO: CPT

## 2019-02-22 PROCEDURE — 80048 BASIC METABOLIC PNL TOTAL CA: CPT

## 2019-02-22 PROCEDURE — 77030004534 HC CATH ANGI DX INFN CARD -A

## 2019-02-22 RX ORDER — SODIUM CHLORIDE 0.9 % (FLUSH) 0.9 %
5-40 SYRINGE (ML) INJECTION AS NEEDED
Status: CANCELLED | OUTPATIENT
Start: 2019-02-22

## 2019-02-22 RX ORDER — SODIUM CHLORIDE 9 MG/ML
75 INJECTION, SOLUTION INTRAVENOUS CONTINUOUS
Status: DISCONTINUED | OUTPATIENT
Start: 2019-02-22 | End: 2019-02-22 | Stop reason: HOSPADM

## 2019-02-22 RX ORDER — MIDAZOLAM HYDROCHLORIDE 1 MG/ML
.5-2 INJECTION, SOLUTION INTRAMUSCULAR; INTRAVENOUS
Status: DISCONTINUED | OUTPATIENT
Start: 2019-02-22 | End: 2019-02-22 | Stop reason: HOSPADM

## 2019-02-22 RX ORDER — SODIUM CHLORIDE 0.9 % (FLUSH) 0.9 %
5-40 SYRINGE (ML) INJECTION EVERY 8 HOURS
Status: CANCELLED | OUTPATIENT
Start: 2019-02-22

## 2019-02-22 RX ORDER — LIDOCAINE HYDROCHLORIDE 10 MG/ML
10-30 INJECTION INFILTRATION; PERINEURAL ONCE
Status: COMPLETED | OUTPATIENT
Start: 2019-02-22 | End: 2019-02-22

## 2019-02-22 RX ORDER — SODIUM CHLORIDE 9 MG/ML
75 INJECTION, SOLUTION INTRAVENOUS CONTINUOUS
Status: CANCELLED | OUTPATIENT
Start: 2019-02-22

## 2019-02-22 RX ORDER — HEPARIN SODIUM 200 [USP'U]/100ML
3 INJECTION, SOLUTION INTRAVENOUS CONTINUOUS
Status: DISCONTINUED | OUTPATIENT
Start: 2019-02-22 | End: 2019-02-22 | Stop reason: HOSPADM

## 2019-02-22 RX ORDER — GUAIFENESIN 100 MG/5ML
324 LIQUID (ML) ORAL ONCE
Status: DISCONTINUED | OUTPATIENT
Start: 2019-02-22 | End: 2019-02-22 | Stop reason: HOSPADM

## 2019-02-22 RX ORDER — DIAZEPAM 5 MG/1
5 TABLET ORAL ONCE
Status: COMPLETED | OUTPATIENT
Start: 2019-02-22 | End: 2019-02-22

## 2019-02-22 RX ADMIN — LIDOCAINE HYDROCHLORIDE 4 ML: 10 INJECTION, SOLUTION INFILTRATION; PERINEURAL at 12:22

## 2019-02-22 RX ADMIN — IOPAMIDOL 80 ML: 755 INJECTION, SOLUTION INTRAVENOUS at 12:33

## 2019-02-22 RX ADMIN — SODIUM CHLORIDE 75 ML/HR: 900 INJECTION, SOLUTION INTRAVENOUS at 09:51

## 2019-02-22 RX ADMIN — HEPARIN SODIUM 2 ML: 10000 INJECTION INTRAVENOUS; SUBCUTANEOUS at 12:25

## 2019-02-22 RX ADMIN — DIAZEPAM 5 MG: 5 TABLET ORAL at 09:51

## 2019-02-22 RX ADMIN — MIDAZOLAM HYDROCHLORIDE 1 MG: 1 INJECTION, SOLUTION INTRAMUSCULAR; INTRAVENOUS at 12:22

## 2019-02-22 RX ADMIN — MIDAZOLAM HYDROCHLORIDE 1 MG: 1 INJECTION, SOLUTION INTRAMUSCULAR; INTRAVENOUS at 12:17

## 2019-02-22 RX ADMIN — HEPARIN SODIUM 3 UNITS/HR: 5000 INJECTION, SOLUTION INTRAVENOUS; SUBCUTANEOUS at 12:08

## 2019-02-22 NOTE — PROGRESS NOTES
Patient up to bedside, vital signs and site stable. Patient ambulated to bathroom without difficulty. Patient voided without difficulty. Vascular site stable. 1415 Discharge instructions and home medications reviewed with patient. Time allowed for questions and answers. 1423 Patient ambulated second time without difficulty. Site stable after ambulation. Peripheral IV sites dc'd without difficulty with tips intact. 1430 Patient discharged to home with family.

## 2019-02-22 NOTE — ROUTINE PROCESS
TRANSFER - OUT REPORT: 
 
[facility] Dr. Ramos Shows RRA access Versed 2mg Diagnostic cath, medical management TR band placed @ [phone] with 12ml air Verbal report given to Aaron Khan RN(name) on Soniya Rick  being transferred to cpru(unit) for routine progression of care Report consisted of patients Situation, Background, Assessment and  
Recommendations(SBAR). Information from the following report(s) Procedure Summary was reviewed with the receiving nurse. Lines:  
Peripheral IV 02/22/19 Right Antecubital (Active) Peripheral IV 02/22/19 Left Antecubital (Active) Opportunity for questions and clarification was provided. Patient transported with: 
 [employer]
<<-----Click here for Discharge Medication Review

## 2019-02-22 NOTE — PROGRESS NOTES
TRANSFER - IN REPORT: 
 
Verbal report received from DEEPALI Wilder(name) on Aquiles Key  being received from cath lab(unit) for routine progression of care Report consisted of patients Situation, Background, Assessment and  
Recommendations(SBAR). Information from the following report(s) Procedure Summary was reviewed with the receiving nurse. Opportunity for questions and clarification was provided. Assessment completed upon patients arrival to unit and care assumed.

## 2019-02-22 NOTE — PROCEDURES
Brief Cardiac Procedure Note    Patient: Soniya Rick MRN: [id number]  SSN: [id number]    YOB: 1950  Age: 68 y.o. Sex: female      Date of Procedure: 2/22/2019     Pre-procedure Diagnosis: Atypical Angina    Post-procedure Diagnosis: Coronary Artery Disease    Reason for Procedure: Suspected CAD    Procedure: Left Heart Catheterization    Brief Description of Procedure: LHC    Performed By: Scott Howard MD     Assistants: NONE    Anesthesia: Moderate Sedation    Estimated Blood Loss: Less than 10 mL      Specimens: None    Implants: None    Findings:   LV NORMAL, EDP 15-20, NO MR OR AV GRAD  CORS MINIMAL IRREGS  RIGHT RADIAL    Complications: None    Recommendations: Continue medical therapy.     Signed By: Scott Howard MD     February 22, 2019

## 2019-02-22 NOTE — PROGRESS NOTES
Patient pre-assessment complete for [facility] poss with Dr Naa Justice scheduled for 19 at 11a,m, arrival time 9am. Patient verified using . Patient instructed to bring all home medications in labeled bottles on the day of procedure. NPO status reinforced. Patient informed to take a full dose aspirin 325mg  or 81 mg x 4 on the day of procedure. Instructed they can take all other medications excluding vitamins & supplements. Patient verbalizes understanding of all instructions & denies any questions at this time.

## 2019-02-22 NOTE — DISCHARGE INSTRUCTIONS
HEART CATHETERIZATION/ANGIOGRAPHY DISCHARGE INSTRUCTIONS    1. Check puncture site frequently for swelling or bleeding. If there is any bleeding, lie down and apply pressure over the area with a clean towel or washcloth and call 911. Notify your doctor for any redness, swelling, drainage, or oozing from the puncture site. Notify your doctor for any fever or chills. 2. If the extremity becomes cold, numb, or painful call [facility] at [phone].  3. Activity should be limited for the next 48 hours. No heavy lifting (anything over 5 pounds) for 3 days. 4. You may resume your usual diet. Drink more fluids than usual.  5. Have a responsible person drive you home and stay with you for at least 24 hours after your heart catheterization/angiography. 6. You may remove bandage from your Right wrist in 24 hours. You may shower in 24 hours. No tub baths, hot tubs, or swimming for 1 week. Do not place any lotions, creams, powders, or ointments over puncture site for 1 week. You may place a clean band-aid over the puncture site each day for 5 days. Change daily. I have read the above instructions and have had the opportunity to ask questions.       Patient: ________________________   Date: 2/22/2019    Witness: _______________________   Date: 2/22/2019

## 2019-02-23 NOTE — PROCEDURES
[facility]  CARDIAC CATH    Name:  Janna Guadalupe  MR#:  [id number]  :  1950  ACCOUNT #:  [de-identified]  DATE OF SERVICE:  2019      REFERRING PHYSICIAN:  Calli Roman PA-C    PRIMARY CARDIOLOGIST:  Jeny Kelley MD    REASON FOR THE PROCEDURE:  Recurrent substernal chest tightness both with exertion and at rest despite a normal nuclear stress test and GI prophylaxis medications. There is a higher suspicion for occult underlying coronary disease given the patient's persistent symptoms. PROCEDURE PERFORMED:  Left heart cath with coronary angiography and left ventriculogram.    TOTAL CONTRAST:  80 mL of Isovue. CONSCIOUS SEDATION:  The patient was sedated with 2 mg Versed by Harpreet Kyle and monitored from 12:17 p.m. to 12:30 p.m. without complication. Frailty score is a 3. PROCEDURE TECHNIQUE:  After informed consent was obtained, the patient was brought to the cath lab, prepped and draped in the usual fashion. A 6-Andorran sheath was placed in the right radial artery via the micropuncture modified Seldinger technique and left heart catheterization performed using standard 5-Andorran angled pigtail and Tiger catheters. Manual pressure will be applied to the patient's access site via TR band protocol. There were no complications. PRESSURE RESULTS:  Aorta 135/64, left ventricle 135/10-20 (pre A-wave 10, post A-wave 20). Left ventriculogram:  There is normal left ventricular regional wall motion with ejection fraction greater than 60%. There is no mitral regurgitation and no aortic valve gradient on catheter pullback. Left ventricular end-diastolic pressure is elevated. CORONARY ANATOMY:  The left main is angiographically normal dividing into an LAD and circumflex in the usual fashion. The LAD wraps around the apex of the left ventricle and supplies a few small caliber diagonal branches. The proximal LAD has mild irregularities of 10%.   The remainder of the LAD has at most minimal luminal irregularities. The diagonals are very small and normal.    Circumflex is a nondominant system which is moderate in caliber, giving rise to two moderate-sized obtuse marginal branches. There are mild irregularities in the proximal circumflex up to 10%. The remainder of the vessel appears fairly normal.    The right coronary is an anatomically dominant vessel supplying a moderate caliber posterior descending and posterolateral branches. There is a focal smooth 20% mid RCA narrowing, but the remainder of the vessel at most has minimal luminal irregularity. CONCLUSIONS:  1. Minimal coronary disease as described above. 2.  Noncardiac chest symptoms most likely. 3.  Normal left ventricular regional wall motion and ejection fraction. 4.  Elevated left ventricular end-diastolic pressure.       Cedrick Orlando MD      [id number]  D:  02/22/2019 12:45  T:  02/23/2019 3:53  JOB #:  [id number]  CC:  Brandin Crooks       [facility] Cardiology

## 2019-05-23 ENCOUNTER — HOSPITAL ENCOUNTER (OUTPATIENT)
Dept: GENERAL RADIOLOGY | Age: 69
Discharge: HOME | End: 2019-05-23
Attending: PHYSICIAN ASSISTANT
Payer: MEDICARE

## 2019-05-23 DIAGNOSIS — R07.81 PLEURITIC CHEST PAIN: ICD-10-CM

## 2019-05-23 DIAGNOSIS — M54.6 ACUTE BILATERAL THORACIC BACK PAIN: ICD-10-CM

## 2019-05-23 PROCEDURE — 72072 X-RAY EXAM THORAC SPINE 3VWS: CPT

## 2019-05-23 PROCEDURE — 71046 X-RAY EXAM CHEST 2 VIEWS: CPT

## 2019-05-23 PROCEDURE — 72070 X-RAY EXAM THORAC SPINE 2VWS: CPT

## 2019-05-23 NOTE — PROGRESS NOTES
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 Please proceed with treatment as we prescribed for a couple weeks and if not improvement noted please schedule a follow-up appt.

## 2019-07-22 ENCOUNTER — HOSPITAL ENCOUNTER (OUTPATIENT)
Dept: CT IMAGING | Age: 69
Discharge: HOME | End: 2019-07-22
Attending: PHYSICIAN ASSISTANT
Payer: MEDICARE

## 2019-07-22 DIAGNOSIS — R07.89 LEFT-SIDED CHEST WALL PAIN: ICD-10-CM

## 2019-07-22 LAB — CREAT BLD-MCNC: 1.1 MG/DL (ref 0.8–1.5)

## 2019-07-22 PROCEDURE — 82565 ASSAY OF CREATININE: CPT

## 2019-07-22 PROCEDURE — 71260 CT THORAX DX C+: CPT

## 2019-07-22 PROCEDURE — 74011636320 HC RX REV CODE- 636/320: Performed by: PHYSICIAN ASSISTANT

## 2019-07-22 PROCEDURE — 74011000258 HC RX REV CODE- 258: Performed by: PHYSICIAN ASSISTANT

## 2019-07-22 RX ORDER — SODIUM CHLORIDE 0.9 % (FLUSH) 0.9 %
10 SYRINGE (ML) INJECTION
Status: COMPLETED | OUTPATIENT
Start: 2019-07-22 | End: 2019-07-22

## 2019-07-22 RX ADMIN — IOPAMIDOL 100 ML: 755 INJECTION, SOLUTION INTRAVENOUS at 11:05

## 2019-07-22 RX ADMIN — SODIUM CHLORIDE 100 ML: 900 INJECTION, SOLUTION INTRAVENOUS at 11:05

## 2019-07-22 RX ADMIN — Medication 10 ML: at 11:05

## 2019-07-22 NOTE — PROGRESS NOTES
If it is a similar pain to what she felt in her ribs then it is likely the same inflammation of cartilage just of a different rib - there are ribs (9th & 10th) that extend into the abdominal region - she can google rib anatomy to see what I am talking about. If it feels like a different type of pain than what we discussed at her last office visit then she may need to come in to be evaluated for that specifically.

## 2019-07-22 NOTE — PROGRESS NOTES
Pt notified. Pt states she is feeling better around her rib cage, but pain has lower to her left lower side around the waste line. Pt states she is taking aleve at night only. Pt is not sleeping well due to the pain. Pt wants to know what should she do next. Please advise.  thanks

## 2019-07-22 NOTE — PROGRESS NOTES
CT is completely normal.  No signs of clot, infection, underlying lung disease, fluid collection, or bone abnormalities. I still feel confident about the diagnosis I explained to her but depending on her activity it may flare up from time to time which is probably what happened recently. Studies show that about 1/2 of the people who suffer from costochondritis experience pain off and on for 6-12 months. I don't feel this warrants daily anti-inflammatory EVERY day but definitely appropriate when/if flare ups of pain occur.

## 2019-07-22 NOTE — PROGRESS NOTES
Pt notified. Pt verbalized understanding.  Pt stated she will call back if she think she needs to be evaluated further

## 2019-09-18 ENCOUNTER — HOSPITAL ENCOUNTER (OUTPATIENT)
Dept: MAMMOGRAPHY | Age: 69
Discharge: HOME | End: 2019-09-18
Attending: PHYSICIAN ASSISTANT
Payer: MEDICARE

## 2019-09-18 DIAGNOSIS — M85.852 OSTEOPENIA OF BOTH HIPS: ICD-10-CM

## 2019-09-18 DIAGNOSIS — Z12.31 ENCOUNTER FOR SCREENING MAMMOGRAM FOR BREAST CANCER: ICD-10-CM

## 2019-09-18 DIAGNOSIS — M89.9 BONE DISORDER: ICD-10-CM

## 2019-09-18 DIAGNOSIS — M85.851 OSTEOPENIA OF BOTH HIPS: ICD-10-CM

## 2019-09-18 PROCEDURE — 77067 SCR MAMMO BI INCL CAD: CPT

## 2019-09-18 PROCEDURE — 77080 DXA BONE DENSITY AXIAL: CPT

## 2019-12-18 ENCOUNTER — HOSPITAL ENCOUNTER (OUTPATIENT)
Dept: CT IMAGING | Age: 69
Discharge: HOME | End: 2019-12-18
Attending: PHYSICIAN ASSISTANT
Payer: MEDICARE

## 2019-12-18 DIAGNOSIS — R10.9 LEFT FLANK PAIN: ICD-10-CM

## 2019-12-18 PROCEDURE — 74011636320 HC RX REV CODE- 636/320: Performed by: PHYSICIAN ASSISTANT

## 2019-12-18 PROCEDURE — 74011000258 HC RX REV CODE- 258: Performed by: PHYSICIAN ASSISTANT

## 2019-12-18 PROCEDURE — 74177 CT ABD & PELVIS W/CONTRAST: CPT

## 2019-12-18 RX ORDER — SODIUM CHLORIDE 0.9 % (FLUSH) 0.9 %
10 SYRINGE (ML) INJECTION
Status: ACTIVE | OUTPATIENT
Start: 2019-12-18 | End: 2019-12-18

## 2019-12-18 RX ADMIN — IOPAMIDOL 100 ML: 755 INJECTION, SOLUTION INTRAVENOUS at 09:28

## 2019-12-18 RX ADMIN — SODIUM CHLORIDE 100 ML: 900 INJECTION, SOLUTION INTRAVENOUS at 09:29

## 2019-12-19 NOTE — PROGRESS NOTES
CT shows complete normalcy in every organ system within her abdomen & pelvis.   Specifically her kidneys, spleen, and colon (organs in the region she is having pain) are completely normal!

## 2020-01-28 ENCOUNTER — HOSPITAL ENCOUNTER (OUTPATIENT)
Dept: MRI IMAGING | Age: 70
Discharge: HOME | End: 2020-01-28
Attending: PHYSICIAN ASSISTANT
Payer: MEDICARE

## 2020-01-28 DIAGNOSIS — M54.6 LEFT-SIDED THORACIC BACK PAIN, UNSPECIFIED CHRONICITY: ICD-10-CM

## 2020-01-28 PROCEDURE — 72146 MRI CHEST SPINE W/O DYE: CPT

## 2020-01-30 NOTE — PROGRESS NOTES
Pt notified of MRI results. Pt verbalized understanding. Pt also requested that MRI results be faxed to [facility] at [phone].

## 2020-06-01 ENCOUNTER — HOSPITAL ENCOUNTER (OUTPATIENT)
Dept: INFUSION THERAPY | Age: 70
Discharge: HOME | End: 2020-06-01
Payer: MEDICARE

## 2020-06-01 VITALS
TEMPERATURE: 98.3 F | HEART RATE: 88 BPM | WEIGHT: 132.2 LBS | RESPIRATION RATE: 16 BRPM | BODY MASS INDEX: 23.42 KG/M2 | SYSTOLIC BLOOD PRESSURE: 130 MMHG | OXYGEN SATURATION: 96 % | DIASTOLIC BLOOD PRESSURE: 55 MMHG

## 2020-06-01 PROCEDURE — 96372 THER/PROPH/DIAG INJ SC/IM: CPT

## 2020-06-01 PROCEDURE — 74011250636 HC RX REV CODE- 250/636: Performed by: PHYSICIAN ASSISTANT

## 2020-06-01 RX ADMIN — DENOSUMAB 60 MG: 60 INJECTION SUBCUTANEOUS at 15:49

## 2020-06-01 NOTE — PROGRESS NOTES
Arrived to infusion. Prolia given and toleratd well. Denies new issues or concerns. Next injection due in 6 months and patient is aware. Discharged ambulatory with self.

## 2020-07-09 ENCOUNTER — HOSPITAL ENCOUNTER (OUTPATIENT)
Dept: MAMMOGRAPHY | Age: 70
Discharge: HOME | End: 2020-07-09
Attending: PHYSICIAN ASSISTANT

## 2020-07-09 DIAGNOSIS — Z12.31 ENCOUNTER FOR SCREENING MAMMOGRAM FOR BREAST CANCER: ICD-10-CM

## 2020-12-30 ENCOUNTER — HOSPITAL ENCOUNTER (OUTPATIENT)
Dept: MAMMOGRAPHY | Age: 70
Discharge: HOME | End: 2020-12-30
Attending: PHYSICIAN ASSISTANT
Payer: MEDICARE

## 2020-12-30 PROCEDURE — 77067 SCR MAMMO BI INCL CAD: CPT

## 2021-01-04 NOTE — PROGRESS NOTES
Pt notified that her mammogram was normal and to do her monthly self breast exams. Plan to do repeat imaging in 1 year. Pt verbalized understanding.

## 2021-04-28 ENCOUNTER — HOSPITAL ENCOUNTER (OUTPATIENT)
Dept: MRI IMAGING | Age: 71
Discharge: HOME | End: 2021-04-28
Attending: NURSE PRACTITIONER
Payer: MEDICARE

## 2021-04-28 DIAGNOSIS — M75.102 ROTATOR CUFF SYNDROME OF LEFT SHOULDER: ICD-10-CM

## 2021-04-28 PROCEDURE — 73221 MRI JOINT UPR EXTREM W/O DYE: CPT

## 2021-04-29 NOTE — PROGRESS NOTES
Placed call to pt and notified her of result note. She voiced understanding and would like ortho consult.

## 2021-04-29 NOTE — PROGRESS NOTES
Please let Nanci Redman know her MRI showed degenerative labral tearing, no rotator cuff tear and likely bursitis (fluid collection). I would like her to follow up with orthopedic for consult if she is interested.

## 2021-06-11 ENCOUNTER — HOSPITAL ENCOUNTER (OUTPATIENT)
Dept: LAB | Age: 71
Discharge: HOME | End: 2021-06-11

## 2021-06-11 ENCOUNTER — HOSPITAL ENCOUNTER (OUTPATIENT)
Dept: INFUSION THERAPY | Age: 71
Discharge: HOME | End: 2021-06-11
Payer: MEDICARE

## 2021-06-11 VITALS
HEART RATE: 71 BPM | SYSTOLIC BLOOD PRESSURE: 116 MMHG | RESPIRATION RATE: 18 BRPM | OXYGEN SATURATION: 98 % | TEMPERATURE: 98.1 F | DIASTOLIC BLOOD PRESSURE: 69 MMHG

## 2021-06-11 LAB — CALCIUM SERPL-MCNC: 9 MG/DL (ref 8.3–10.4)

## 2021-06-11 PROCEDURE — 74011250636 HC RX REV CODE- 250/636: Performed by: PHYSICIAN ASSISTANT

## 2021-06-11 PROCEDURE — 96372 THER/PROPH/DIAG INJ SC/IM: CPT

## 2021-06-11 PROCEDURE — 36415 COLL VENOUS BLD VENIPUNCTURE: CPT

## 2021-06-11 PROCEDURE — 82310 ASSAY OF CALCIUM: CPT

## 2021-06-11 RX ADMIN — DENOSUMAB 60 MG: 60 INJECTION SUBCUTANEOUS at 11:51

## 2021-06-11 NOTE — PROGRESS NOTES
Arrived to the [facility]. Prolia completed and tolerated well. Any issues or concerns during appointment: denies  Patient given education on injection   Instructed patient to notify provider for any issues or worrisome symptoms. They verbalized understanding. Patient aware of next infusion appointment scheduled for 12/13/21 at 2pm  Discharged ambulatory with self.

## 2021-06-17 PROBLEM — M19.012 OSTEOARTHRITIS OF LEFT AC (ACROMIOCLAVICULAR) JOINT: Status: ACTIVE | Noted: 2021-06-17

## 2021-06-17 PROBLEM — M25.512 LEFT SHOULDER PAIN: Status: ACTIVE | Noted: 2021-06-17

## 2021-07-13 PROBLEM — C43.71 MALIGNANT MELANOMA OF RIGHT LOWER EXTREMITY INCLUDING HIP (HCC): Status: RESOLVED | Noted: 2018-10-19 | Resolved: 2021-07-13

## 2022-03-19 PROBLEM — M19.012 OSTEOARTHRITIS OF LEFT AC (ACROMIOCLAVICULAR) JOINT: Status: ACTIVE | Noted: 2021-06-17

## 2022-03-20 PROBLEM — R07.89 CHEST TIGHTNESS: Status: ACTIVE | Noted: 2018-10-10

## 2022-03-20 PROBLEM — M25.512 LEFT SHOULDER PAIN: Status: ACTIVE | Noted: 2021-06-17

## 2022-03-20 PROBLEM — R06.02 SHORTNESS OF BREATH: Status: ACTIVE | Noted: 2018-10-10

## 2022-04-25 ENCOUNTER — HOSPITAL ENCOUNTER (OUTPATIENT)
Dept: LAB | Age: 72
Discharge: HOME | End: 2022-04-25

## 2022-04-25 ENCOUNTER — HOSPITAL ENCOUNTER (OUTPATIENT)
Dept: INFUSION THERAPY | Age: 72
Discharge: HOME | End: 2022-04-25
Payer: MEDICARE

## 2022-04-25 VITALS
HEART RATE: 64 BPM | DIASTOLIC BLOOD PRESSURE: 65 MMHG | TEMPERATURE: 98 F | OXYGEN SATURATION: 96 % | SYSTOLIC BLOOD PRESSURE: 110 MMHG

## 2022-04-25 LAB
CALCIUM SERPL-MCNC: 9.2 MG/DL (ref 8.3–10.4)
CREAT SERPL-MCNC: 1.2 MG/DL (ref 0.6–1)

## 2022-04-25 PROCEDURE — 82310 ASSAY OF CALCIUM: CPT

## 2022-04-25 PROCEDURE — 74011250636 HC RX REV CODE- 250/636: Performed by: PHYSICIAN ASSISTANT

## 2022-04-25 PROCEDURE — 96372 THER/PROPH/DIAG INJ SC/IM: CPT

## 2022-04-25 PROCEDURE — 82565 ASSAY OF CREATININE: CPT

## 2022-04-25 PROCEDURE — 36415 COLL VENOUS BLD VENIPUNCTURE: CPT

## 2022-04-25 RX ADMIN — DENOSUMAB 60 MG: 60 INJECTION SUBCUTANEOUS at 10:53

## 2022-04-25 NOTE — PROGRESS NOTES
Arrived to the infusion center. Prolia inj. Given and tolerated. No issues or concerns voiced during the visit. No further appointment for Prolia sscheduled at this time. Pt. Will follow up. Discharged ambulatory in satisfactory condition.

## 2022-04-28 ENCOUNTER — HOSPITAL ENCOUNTER (OUTPATIENT)
Dept: MAMMOGRAPHY | Age: 72
Discharge: HOME | End: 2022-04-28
Attending: PHYSICIAN ASSISTANT
Payer: MEDICARE

## 2022-04-28 DIAGNOSIS — Z12.31 ENCOUNTER FOR SCREENING MAMMOGRAM FOR BREAST CANCER: ICD-10-CM

## 2022-04-28 DIAGNOSIS — M89.9 BONE DISORDER: ICD-10-CM

## 2022-04-28 DIAGNOSIS — M85.852 OSTEOPENIA OF BOTH HIPS: ICD-10-CM

## 2022-04-28 DIAGNOSIS — M85.851 OSTEOPENIA OF BOTH HIPS: ICD-10-CM

## 2022-04-28 PROCEDURE — 77067 SCR MAMMO BI INCL CAD: CPT

## 2022-04-28 PROCEDURE — 77080 DXA BONE DENSITY AXIAL: CPT

## 2022-04-28 NOTE — PROGRESS NOTES
Bone density shows no worsening with slight improvements in L hip specifically. I would like her to continue Prolia injections every 6 months.

## 2022-04-29 NOTE — PROGRESS NOTES
Pt notified that her bone density shows no worsening with slight improvements in L hip specifically and Elsy Jo would like her to continue Prolia injections every 6 months. Pt verbalized understanding.

## 2022-06-06 ENCOUNTER — OFFICE VISIT (OUTPATIENT)
Dept: CARDIOLOGY CLINIC | Age: 72
End: 2022-06-06
Payer: MEDICARE

## 2022-06-06 VITALS
WEIGHT: 134 LBS | SYSTOLIC BLOOD PRESSURE: 110 MMHG | BODY MASS INDEX: 22.88 KG/M2 | HEART RATE: 69 BPM | DIASTOLIC BLOOD PRESSURE: 78 MMHG | HEIGHT: 64 IN

## 2022-06-06 DIAGNOSIS — E03.9 HYPOTHYROIDISM (ACQUIRED): ICD-10-CM

## 2022-06-06 DIAGNOSIS — R07.89 CHEST TIGHTNESS: Primary | ICD-10-CM

## 2022-06-06 DIAGNOSIS — R06.02 SHORTNESS OF BREATH: ICD-10-CM

## 2022-06-06 DIAGNOSIS — E78.2 MIXED HYPERLIPIDEMIA: ICD-10-CM

## 2022-06-06 DIAGNOSIS — K21.9 GASTROESOPHAGEAL REFLUX DISEASE WITHOUT ESOPHAGITIS: ICD-10-CM

## 2022-06-06 PROCEDURE — 93000 ELECTROCARDIOGRAM COMPLETE: CPT | Performed by: INTERNAL MEDICINE

## 2022-06-06 PROCEDURE — 99204 OFFICE O/P NEW MOD 45 MIN: CPT | Performed by: INTERNAL MEDICINE

## 2022-06-06 PROCEDURE — 1123F ACP DISCUSS/DSCN MKR DOCD: CPT | Performed by: INTERNAL MEDICINE

## 2022-06-06 ASSESSMENT — ENCOUNTER SYMPTOMS
SORE THROAT: 0
SHORTNESS OF BREATH: 1
ABDOMINAL DISTENTION: 0
DIARRHEA: 0
ABDOMINAL PAIN: 0
PHOTOPHOBIA: 0
CONSTIPATION: 0
COUGH: 0

## 2022-06-06 NOTE — PROGRESS NOTES
[facility] CARDIOLOGY  [address] [facility], [address]  PHONE: [phone]        NAME:  Mike Meek  : 1950  MRN: [id number]     PCP:  Heather Mensah MD      SUBJECTIVE:   Mike Meek is a 74 y.o. female seen for a follow up visit regarding the following:     Chief Complaint   Patient presents with    Excessive Sweating    Palpitations       HPI:    [facility] last seen in the catheterization lab 2019 in the setting of recurrent substernal chest discomfort despite a negative nuclear stress test.  Cardiac cath 2019:  1. Minimal coronary disease as described above. 2.  Noncardiac chest symptoms most likely. 3.  Normal left ventricular regional wall motion and ejection fraction. 4.  Elevated left ventricular end-diastolic pressure. Under lots of situational stress recently with recurrent SSCP associated. Having recurrent CP, SOB, DAVIS, and acute diaphoresis with low level stress. Exam and ECG benign. Wonders if symptoms could be due to antidepressant but doesn't want to stop it at present. Past Medical History, Past Surgical History, Family history, Social History, and Medications were all reviewed with the patient today and updated as necessary. Current Outpatient Medications   Medication Sig Dispense Refill    KRILL OIL PO Take 1 tablet by mouth Daily with lunch      aspirin 81 MG EC tablet Take 81 mg by mouth      Cetirizine HCl 10 MG CAPS Take 1 capsule by mouth      clobetasol (TEMOVATE) 0.05 % ointment APPLY TO AFFECTED AREAS TWICE DAILY WHEN FLARES      clonazePAM (KLONOPIN) 1 MG tablet Take 1/2-1 tablet po q AM as needed for anxiety & 1.5 tabets po q hs for sleep.       denosumab (PROLIA) 60 MG/ML SOSY SC injection Inject 60 mg into the skin      famotidine (PEPCID) 40 MG tablet Take 40 mg by mouth 2 times daily      fluticasone (FLONASE) 50 MCG/ACT nasal spray 2 sprays by Nasal route daily      levothyroxine (SYNTHROID) 50 MCG tablet Take 1 tablet po before breakfast x 5 days/week + 2 tablets po before breakfast x 2 days/week. Brand medically necessary.  polyethylene glycol (GLYCOLAX) 17 GM/SCOOP powder Take 17 g by mouth daily      vilazodone HCl (VIIBRYD) 20 MG TABS Take 20 mg by mouth daily (with breakfast)      Biotin 2.5 MG CAPS Take 1 tablet by mouth (Patient not taking: Reported on 6/6/2022)      Calcium Carbonate-Vitamin D (CALCIUM-VITAMIN D) 600-125 MG-UNIT TABS Take 1 tablet by mouth 2 times daily (Patient not taking: Reported on 6/6/2022)      vitamin D3 (CHOLECALCIFEROL) 125 MCG (5000 UT) TABS tablet Take 5,000 Units by mouth (Patient not taking: Reported on 6/6/2022)      nitroGLYCERIN (NITROSTAT) 0.4 MG SL tablet Place 0.4 mg under the tongue (Patient not taking: Reported on 6/6/2022)       No current facility-administered medications for this visit.         Allergies   Allergen Reactions    Morphine Other (See Comments)     \"I almost checked out\"-Trendelenburg    Nitrofurantoin Diarrhea     And Nausea    Oxycodone-Acetaminophen Diarrhea    Propranolol Other (See Comments)     \"Didn't sleep for 2 days and  I saw green splotches on 2 different days \"    Sulfa Antibiotics Other (See Comments)     As a child/ unknown reactions       Patient Active Problem List    Diagnosis Date Noted    Osteoarthritis of left AC (acromioclavicular) joint 06/17/2021    Left shoulder pain 06/17/2021    Chest tightness 10/10/2018    Shortness of breath 10/10/2018    Hypothyroidism (acquired)      Overview Note:     manage well with Synthroid        Moderate recurrent major depression ([facility] [address] 02/22/2016    Gastroesophageal reflux disease without esophagitis 02/22/2016    Vitamin D deficiency 10/24/2014    Situational stress 10/24/2014    Osteopenia 10/24/2014    Mixed hyperlipidemia 10/24/2014        Past Surgical History:   Procedure Laterality Date    CARDIAC CATHETERIZATION  02/22/2019    CATARACT REMOVAL Bilateral 1/2022, 2/2022    COLONOSCOPY  4/2005 & 1/26/17    Tortuous Colon & Internal Hemorrhoids    MALIGNANT SKIN LESION EXCISION  10/2010    Hx of Basal Cell Carcinoma - L Chest & Nose    MALIGNANT SKIN LESION EXCISION  10/31/2018    R Lower Leg    ORTHOPEDIC SURGERY Right 8/27/15    Trigger Finger Release    ORTHOPEDIC SURGERY Right 9/8/15    Interphalangeal Resection Arthroplasty Foot    ORTHOPEDIC SURGERY Left 02/16/2017    Achilles Reconstruction    GILBERTO AND BSO  1994    UPPER GASTROINTESTINAL ENDOSCOPY  01/26/2017    Hiatal Hernia       Family History   Problem Relation Age of Onset    Stroke Mother     Hypertension Father     Elevated Lipids Mother     Heart Failure Father     Elevated Lipids Father     Diabetes Sister 54        Surgically Induced, Diabetic Coma    Breast Cancer Neg Hx     Heart Disease Father         CAD        Social History     Tobacco Use    Smoking status: Never Smoker    Smokeless tobacco: Never Used   Substance Use Topics    Alcohol use: Yes     Alcohol/week: 2.0 standard drinks       ROS:    Review of Systems   Constitutional: Positive for diaphoresis. Negative for appetite change, chills and fatigue. HENT: Negative for congestion, mouth sores, nosebleeds, sore throat and tinnitus. Eyes: Negative for photophobia and visual disturbance. Respiratory: Positive for shortness of breath. Negative for cough. Cardiovascular: Positive for chest pain. Negative for palpitations and leg swelling. Gastrointestinal: Negative for abdominal distention, abdominal pain, constipation and diarrhea. Endocrine: Negative for cold intolerance, heat intolerance, polydipsia and polyuria. Genitourinary: Negative for dysuria and hematuria. Musculoskeletal: Negative for arthralgias, joint swelling and myalgias. Skin: Negative for rash. Allergic/Immunologic: Negative for environmental allergies and food allergies. Neurological: Negative for dizziness, seizures, syncope and light-headedness. Hematological: Negative for adenopathy. Does not bruise/bleed easily. Psychiatric/Behavioral: Negative for agitation, behavioral problems, dysphoric mood and hallucinations. The patient is not nervous/anxious. PHYSICAL EXAM:     Vitals:    06/06/22 0956   BP: 110/78   Pulse: 69   Weight: 134 lb (60.8 kg)   Height: 5' 4\" (1.626 m)      Wt Readings from Last 3 Encounters:   06/06/22 134 lb (60.8 kg)   04/19/22 135 lb 12.8 oz (61.6 kg)   01/11/22 138 lb (62.6 kg)      BP Readings from Last 3 Encounters:   06/06/22 110/78   04/19/22 115/70   01/11/22 128/80        Physical Exam  Constitutional:       Appearance: Normal appearance. She is normal weight. HENT:      Head: Normocephalic and atraumatic. Nose: Nose normal.      Mouth/Throat:      Mouth: Mucous membranes are moist.      Pharynx: Oropharynx is clear. Eyes:      Extraocular Movements: Extraocular movements intact. Pupils: Pupils are equal, round, and reactive to light. Neck:      Vascular: No carotid bruit or JVD. Cardiovascular:      Rate and Rhythm: Normal rate and regular rhythm. Heart sounds: No murmur heard. No friction rub. No gallop. Pulmonary:      Effort: Pulmonary effort is normal.      Breath sounds: Normal breath sounds. No wheezing or rhonchi. Abdominal:      General: Abdomen is flat. Bowel sounds are normal. There is no distension. Palpations: Abdomen is soft. Tenderness: There is no abdominal tenderness. Musculoskeletal:         General: No swelling. Normal range of motion. Cervical back: Normal range of motion and neck supple. No tenderness. Skin:     General: Skin is warm and dry. Neurological:      General: No focal deficit present. Mental Status: She is alert and oriented to person, place, and time. Mental status is at baseline.    Psychiatric:         Mood and Affect: Mood normal.         Behavior: Behavior normal.          Medical problems and test results were reviewed with the patient today. Lab Results   Component Value Date    CHOL 206 (H) 04/08/2022    CHOL 206 (H) 01/04/2022    CHOL 207 (H) 09/09/2021     Lab Results   Component Value Date    TRIG 209 (H) 04/08/2022    TRIG 150 (H) 01/04/2022    TRIG 254 (H) 09/09/2021     Lab Results   Component Value Date    HDL 40 04/08/2022    HDL 43 01/04/2022    HDL 37 (L) 09/09/2021     Lab Results   Component Value Date    LDLCALC 129 (H) 04/08/2022    LDLCALC 136 (H) 01/04/2022    LDLCALC 125 (H) 09/09/2021     Lab Results   Component Value Date    LABVLDL 47 (H) 05/19/2020    LABVLDL 36 01/02/2020    LABVLDL 49 (H) 09/12/2019    VLDL 37 04/08/2022    VLDL 27 01/04/2022    VLDL 45 (H) 09/09/2021     No results found for: [facility]     Lab Results   Component Value Date     04/08/2022    K 4.1 04/08/2022     04/08/2022    CO2 22 04/08/2022    BUN 19 04/08/2022    CREATININE 1.20 04/25/2022    GLUCOSE 96 04/08/2022    CALCIUM 9.2 04/25/2022         No results for input(s): WBC, HGB, HCT, MCV, PLT in the last 720 hours. Lab Results   Component Value Date    LABA1C 5.5 04/08/2022     Lab Results   Component Value Date     04/08/2022        No results found for: BNP     Lab Results   Component Value Date    TSH 4.570 (H) 04/08/2022        Results for orders placed or performed in visit on 06/06/22   EKG 12 lead    Impression    Sinus  Rhythm 69bpm  Normal axis and intervals  NSSTTW changes  WITHIN NORMAL LIMITS        ASSESSMENT and PLAN    Kan Perez was seen today for excessive sweating and palpitations. Diagnoses and all orders for this visit:    Chest tightness- recurrent, probably situational stress induced but recurrent and worrying her. Check exercise nuclear stress test.  Convert to walking/Lexiscan nuclear stress test if shortness of breath will not allow her to reach target.   -     EKG 12 lead  -     Transthoracic echocardiogram (TTE) complete with contrast, bubble, strain, and 3D PRN; Future  -     Nuclear stress test with myocardial perfusion; Future    Gastroesophageal reflux disease without esophagitis- continue meds with PCP surveillance    Hypothyroidism (acquired)- continue meds with PCP surveillance    Mixed hyperlipidemia- stable continue meds with outpatient  -     Transthoracic echocardiogram (TTE) complete with contrast, bubble, strain, and 3D PRN; Future  -     Nuclear stress test with myocardial perfusion; Future    Shortness of breath - recurrent and worsening, exertional in nature. No volume overload or heart failure symptoms. Check echo for systolic and diastolic/valvular function and check exercise nuclear stress test for functional capacity, BP and heart rate response to stress, ECG changes and for coronary ischemia.  -     Transthoracic echocardiogram (TTE) complete with contrast, bubble, strain, and 3D PRN; Future  -     Nuclear stress test with myocardial perfusion; Future            Return in about 4 weeks (around 7/4/2022).          Amarilis Ballard MD  6/6/2022  10:24 AM

## 2022-06-08 RX ORDER — CLONAZEPAM 1 MG/1
TABLET ORAL
Qty: 75 TABLET | OUTPATIENT
Start: 2022-06-08

## 2022-06-13 DIAGNOSIS — F51.04 CHRONIC INSOMNIA: Primary | ICD-10-CM

## 2022-06-13 DIAGNOSIS — F43.9 SITUATIONAL STRESS: ICD-10-CM

## 2022-06-13 RX ORDER — CLONAZEPAM 1 MG/1
TABLET ORAL
Qty: 75 TABLET | Refills: 0 | Status: SHIPPED | OUTPATIENT
Start: 2022-06-13 | End: 2022-07-28 | Stop reason: SDUPTHER

## 2022-06-13 NOTE — TELEPHONE ENCOUNTER
Patient requesting refill on clonazepam. Send to [facility] on [address] [place] [address] in [facility].

## 2022-06-13 NOTE — TELEPHONE ENCOUNTER
Pt requesting Clonazepam refills, please. Scripts last fill date: 5/1/22 for a 30 day supply. Med pended for pick-up today.

## 2022-06-14 ENCOUNTER — OFFICE VISIT (OUTPATIENT)
Dept: ORTHOPEDIC SURGERY | Age: 72
End: 2022-06-14
Payer: MEDICARE

## 2022-06-14 ENCOUNTER — OFFICE VISIT (OUTPATIENT)
Dept: INTERNAL MEDICINE CLINIC | Facility: CLINIC | Age: 72
End: 2022-06-14
Payer: MEDICARE

## 2022-06-14 VITALS
HEIGHT: 64 IN | SYSTOLIC BLOOD PRESSURE: 130 MMHG | HEART RATE: 80 BPM | OXYGEN SATURATION: 96 % | DIASTOLIC BLOOD PRESSURE: 70 MMHG | TEMPERATURE: 97 F | BODY MASS INDEX: 22.61 KG/M2 | WEIGHT: 132.4 LBS | RESPIRATION RATE: 16 BRPM

## 2022-06-14 DIAGNOSIS — M25.512 LEFT SHOULDER PAIN, UNSPECIFIED CHRONICITY: ICD-10-CM

## 2022-06-14 DIAGNOSIS — L28.2 PRURITIC RASH: ICD-10-CM

## 2022-06-14 DIAGNOSIS — M79.89 RIGHT LEG SWELLING: Primary | ICD-10-CM

## 2022-06-14 DIAGNOSIS — M25.511 RIGHT SHOULDER PAIN, UNSPECIFIED CHRONICITY: Primary | ICD-10-CM

## 2022-06-14 DIAGNOSIS — M19.019 AC JOINT ARTHROPATHY: ICD-10-CM

## 2022-06-14 DIAGNOSIS — M79.661 RIGHT CALF PAIN: ICD-10-CM

## 2022-06-14 PROBLEM — C43.71 MALIGNANT MELANOMA OF RIGHT LOWER EXTREMITY INCLUDING HIP (HCC): Status: ACTIVE | Noted: 2018-10-19

## 2022-06-14 PROCEDURE — G8399 PT W/DXA RESULTS DOCUMENT: HCPCS | Performed by: NURSE PRACTITIONER

## 2022-06-14 PROCEDURE — G8427 DOCREV CUR MEDS BY ELIG CLIN: HCPCS | Performed by: NURSE PRACTITIONER

## 2022-06-14 PROCEDURE — 99213 OFFICE O/P EST LOW 20 MIN: CPT | Performed by: PHYSICIAN ASSISTANT

## 2022-06-14 PROCEDURE — 1090F PRES/ABSN URINE INCON ASSESS: CPT | Performed by: PHYSICIAN ASSISTANT

## 2022-06-14 PROCEDURE — G8427 DOCREV CUR MEDS BY ELIG CLIN: HCPCS | Performed by: PHYSICIAN ASSISTANT

## 2022-06-14 PROCEDURE — 3017F COLORECTAL CA SCREEN DOC REV: CPT | Performed by: PHYSICIAN ASSISTANT

## 2022-06-14 PROCEDURE — 1090F PRES/ABSN URINE INCON ASSESS: CPT | Performed by: NURSE PRACTITIONER

## 2022-06-14 PROCEDURE — 20605 DRAIN/INJ JOINT/BURSA W/O US: CPT | Performed by: PHYSICIAN ASSISTANT

## 2022-06-14 PROCEDURE — G8420 CALC BMI NORM PARAMETERS: HCPCS | Performed by: PHYSICIAN ASSISTANT

## 2022-06-14 PROCEDURE — 1123F ACP DISCUSS/DSCN MKR DOCD: CPT | Performed by: PHYSICIAN ASSISTANT

## 2022-06-14 PROCEDURE — 99214 OFFICE O/P EST MOD 30 MIN: CPT | Performed by: NURSE PRACTITIONER

## 2022-06-14 PROCEDURE — 1123F ACP DISCUSS/DSCN MKR DOCD: CPT | Performed by: NURSE PRACTITIONER

## 2022-06-14 PROCEDURE — 1036F TOBACCO NON-USER: CPT | Performed by: NURSE PRACTITIONER

## 2022-06-14 PROCEDURE — G8399 PT W/DXA RESULTS DOCUMENT: HCPCS | Performed by: PHYSICIAN ASSISTANT

## 2022-06-14 PROCEDURE — 3017F COLORECTAL CA SCREEN DOC REV: CPT | Performed by: NURSE PRACTITIONER

## 2022-06-14 PROCEDURE — G8420 CALC BMI NORM PARAMETERS: HCPCS | Performed by: NURSE PRACTITIONER

## 2022-06-14 PROCEDURE — 1036F TOBACCO NON-USER: CPT | Performed by: PHYSICIAN ASSISTANT

## 2022-06-14 RX ORDER — PSEUDOEPHEDRINE HCL 30 MG
100 TABLET ORAL DAILY
COMMUNITY

## 2022-06-14 RX ORDER — LATANOPROST 50 UG/ML
SOLUTION/ DROPS OPHTHALMIC
COMMUNITY
Start: 2022-05-28

## 2022-06-14 RX ORDER — TRIAMCINOLONE ACETONIDE 1 MG/G
CREAM TOPICAL
Qty: 30 G | Refills: 0 | Status: SHIPPED | OUTPATIENT
Start: 2022-06-14 | End: 2022-07-18

## 2022-06-14 RX ORDER — METHYLPREDNISOLONE ACETATE 40 MG/ML
80 INJECTION, SUSPENSION INTRA-ARTICULAR; INTRALESIONAL; INTRAMUSCULAR; SOFT TISSUE ONCE
Status: COMPLETED | OUTPATIENT
Start: 2022-06-14 | End: 2022-06-14

## 2022-06-14 RX ORDER — LIDOCAINE HYDROCHLORIDE 10 MG/ML
4 INJECTION, SOLUTION INFILTRATION; PERINEURAL ONCE
Status: COMPLETED | OUTPATIENT
Start: 2022-06-14 | End: 2022-06-14

## 2022-06-14 RX ORDER — RANITIDINE 150 MG/1
150 TABLET ORAL 2 TIMES DAILY
COMMUNITY
End: 2022-07-13

## 2022-06-14 RX ORDER — ONDANSETRON 4 MG/1
TABLET, ORALLY DISINTEGRATING ORAL
COMMUNITY
Start: 2022-06-01 | End: 2022-07-18

## 2022-06-14 RX ORDER — ESOMEPRAZOLE MAGNESIUM 40 MG/1
CAPSULE, DELAYED RELEASE ORAL
COMMUNITY
Start: 2022-06-08

## 2022-06-14 RX ORDER — LUBIPROSTONE 8 UG/1
8 CAPSULE, GELATIN COATED ORAL 2 TIMES DAILY WITH MEALS
COMMUNITY
End: 2022-07-18

## 2022-06-14 RX ADMIN — LIDOCAINE HYDROCHLORIDE 4 ML: 10 INJECTION, SOLUTION INFILTRATION; PERINEURAL at 16:36

## 2022-06-14 RX ADMIN — METHYLPREDNISOLONE ACETATE 80 MG: 40 INJECTION, SUSPENSION INTRA-ARTICULAR; INTRALESIONAL; INTRAMUSCULAR; SOFT TISSUE at 16:37

## 2022-06-14 ASSESSMENT — ENCOUNTER SYMPTOMS
SHORTNESS OF BREATH: 0
VOMITING: 0
ABDOMINAL PAIN: 0
COUGH: 0
DIARRHEA: 0
NAUSEA: 0
COLOR CHANGE: 0

## 2022-06-14 ASSESSMENT — PATIENT HEALTH QUESTIONNAIRE - PHQ9
2. FEELING DOWN, DEPRESSED OR HOPELESS: 0
SUM OF ALL RESPONSES TO PHQ9 QUESTIONS 1 & 2: 0
1. LITTLE INTEREST OR PLEASURE IN DOING THINGS: 0
SUM OF ALL RESPONSES TO PHQ QUESTIONS 1-9: 0

## 2022-06-14 NOTE — PROGRESS NOTES
[facility]      2022    Patient Name: Mike Meek  :  1950      Chief Complaint:  Chief Complaint   Patient presents with    Other     r leg swelling and soreness in calf since Saturday         HPI   Patient presents today accompanied by her  with complaint of right calf swelling and pain. Symptoms started three days ago. She describes the pain as a soreness and cramping sensation. She denies any erythema or warmth. She denies any recent surgery or long distance travel. She is not on any hormone replacement therapy. She denies any new activities or exercise. She reports that the tenderness has improved slightly since presentation three days ago. Patient also with complaint of pruritic lesions to her bilateral legs and arms. She noticed the lesions initially about 3 weeks ago. She thought initially that it was due to chigger bites after working in her yard about 3 weeks ago. She feels that the lesions are spreading. She has been applying calamine lotion but denies much improvement. No one else in the home has similar lesions.        Past Medical History:   Diagnosis Date    Acoustic neuroma [facility])     Followed by Dr Lord Desir cell carcinoma     Multiple    Depression     Environmental allergies     Seasonal    Epiploic appendagitis Dec 2014    Per CT Scan    GERD (gastroesophageal reflux disease)     Hiatal hernia 2017    Per Endoscopy    Hypothyroidism (acquired)     Insomnia     Internal hemorrhoids 2017    Per Colonoscopy    Melanoma ([facility] [address] 10/2018    R Lower Leg    Menopause     Mild coronary artery disease 2019    Per Cath    Mixed hyperlipidemia     Neurogenic bladder     Osteopenia     managed with Prolia- last injection /    PONV (postoperative nausea and vomiting)     Psoriasis     Situational anxiety     Vitamin D deficiency        Past Surgical History:   Procedure Laterality Date    CARDIAC CATHETERIZATION 02/22/2019    CATARACT REMOVAL Bilateral 1/2022, 2/2022    COLONOSCOPY  4/2005 & 1/26/17    Tortuous Colon & Internal Hemorrhoids    MALIGNANT SKIN LESION EXCISION  10/2010    Hx of Basal Cell Carcinoma - L Chest & Nose    MALIGNANT SKIN LESION EXCISION  10/31/2018    R Lower Leg    ORTHOPEDIC SURGERY Right 8/27/15    Trigger Finger Release    ORTHOPEDIC SURGERY Right 9/8/15    Interphalangeal Resection Arthroplasty Foot    ORTHOPEDIC SURGERY Left 02/16/2017    Achilles Reconstruction    GILBERTO AND BSO (CERVIX REMOVED)  1994    UPPER GASTROINTESTINAL ENDOSCOPY  01/26/2017    Hiatal Hernia       Family History   Problem Relation Age of Onset    Stroke Mother     Hypertension Father     Elevated Lipids Mother     Heart Failure Father     Elevated Lipids Father     Diabetes Sister 54        Surgically Induced, Diabetic Coma    Breast Cancer Neg Hx     Heart Disease Father         CAD       Social History     Tobacco Use    Smoking status: Never Smoker    Smokeless tobacco: Never Used   Substance Use Topics    Alcohol use: Yes     Alcohol/week: 2.0 standard drinks    Drug use: No         Current Outpatient Medications:     docusate (COLACE, DULCOLAX) 100 MG CAPS, Take 100 mg by mouth daily, Disp: , Rfl:     esomeprazole (NEXIUM) 40 MG delayed release capsule, TAKE 1 CAPSULE BY MOUTH EVERY DAY, Disp: , Rfl:     latanoprost (XALATAN) 0.005 % ophthalmic solution, INSTILL 1 DROP IN BOTH EYES EVERY EVENING, Disp: , Rfl:     lubiprostone (AMITIZA) 8 MCG CAPS capsule, Take 8 mcg by mouth 2 times daily (with meals), Disp: , Rfl:     ondansetron (ZOFRAN-ODT) 4 MG disintegrating tablet, TAKE 1 TABLET BY ORAL ROUTE EVERY 6-8 HOURS AS NEEDED FOR NAUSEA AND PLACE ON TOP OF THE TONGUE WHERE IT WILL DISSOLVE, Disp: , Rfl:     raNITIdine (ZANTAC) 150 MG tablet, Take 150 mg by mouth 2 times daily, Disp: , Rfl:     triamcinolone (KENALOG) 0.1 % cream, Apply topically 2 times daily. , Disp: 30 g, Rfl: 0   clonazePAM (KLONOPIN) 1 MG tablet, Take 1/2-1 tablet po q AM as needed for anxiety & 1.5 tabets po q hs for sleep., Disp: 75 tablet, Rfl: 0    KRILL OIL PO, Take 1 tablet by mouth Daily with lunch, Disp: , Rfl:     aspirin 81 MG EC tablet, Take 81 mg by mouth, Disp: , Rfl:     Biotin 2.5 MG CAPS, Take 1 tablet by mouth , Disp: , Rfl:     Calcium Carbonate-Vitamin D (CALCIUM-VITAMIN D) 600-125 MG-UNIT TABS, Take 1 tablet by mouth 2 times daily , Disp: , Rfl:     Cetirizine HCl 10 MG CAPS, Take 1 capsule by mouth, Disp: , Rfl:     clobetasol (TEMOVATE) 0.05 % ointment, APPLY TO AFFECTED AREAS TWICE DAILY WHEN FLARES, Disp: , Rfl:     denosumab (PROLIA) 60 MG/ML SOSY SC injection, Inject 60 mg into the skin, Disp: , Rfl:     famotidine (PEPCID) 40 MG tablet, Take 40 mg by mouth 2 times daily, Disp: , Rfl:     fluticasone (FLONASE) 50 MCG/ACT nasal spray, 2 sprays by Nasal route daily, Disp: , Rfl:     levothyroxine (SYNTHROID) 50 MCG tablet, Take 1 tablet po before breakfast x 5 days/week + 2 tablets po before breakfast x 2 days/week. Brand medically necessary. , Disp: , Rfl:     polyethylene glycol (GLYCOLAX) 17 GM/SCOOP powder, Take 17 g by mouth daily, Disp: , Rfl:     vilazodone HCl (VIIBRYD) 20 MG TABS, Take 20 mg by mouth daily (with breakfast), Disp: , Rfl:     vitamin D3 (CHOLECALCIFEROL) 125 MCG (5000 UT) TABS tablet, Take 5,000 Units by mouth (Patient not taking: Reported on 6/6/2022), Disp: , Rfl:     nitroGLYCERIN (NITROSTAT) 0.4 MG SL tablet, Place 0.4 mg under the tongue (Patient not taking: Reported on 6/6/2022), Disp: , Rfl:     Allergies   Allergen Reactions    Morphine Other (See Comments)     \"I almost checked out\"-Trendelenburg  \"I almost checked out\"-Trendelenburg    Nitrofurantoin Diarrhea     And Nausea    Oxycodone-Acetaminophen Diarrhea    Propranolol Other (See Comments)     \"Didn't sleep for 2 days and  I saw green splotches on 2 different days \"  \"Didn't sleep for 2 days and I saw green splotches on 2 different days \"    Sulfa Antibiotics Other (See Comments)     As a child/ unknown reactions  As a child/ unknown reactions       Review of Systems   Constitutional: Negative for chills and fever. Respiratory: Negative for cough and shortness of breath. Cardiovascular: Positive for leg swelling. Negative for chest pain and palpitations. Gastrointestinal: Negative for abdominal pain, diarrhea, nausea and vomiting. Musculoskeletal: Negative for arthralgias and gait problem. Skin: Negative for color change and rash. Neurological: Negative for dizziness, weakness, light-headedness, numbness and headaches. Objective:  /70 (Site: Left Upper Arm, Position: Sitting, Cuff Size: Small Adult)   Pulse 80   Temp 97 °F (36.1 °C) (Temporal)   Resp 16   Ht 5' 4\" (1.626 m)   Wt 132 lb 6.4 oz (60.1 kg)   SpO2 96%   BMI 22.73 kg/m²       Examination:  Physical Exam  Vitals and nursing note reviewed. Constitutional:       General: She is not in acute distress. Appearance: Normal appearance. Cardiovascular:      Rate and Rhythm: Normal rate and regular rhythm. Pulses:           Dorsalis pedis pulses are 2+ on the right side and 2+ on the left side. Posterior tibial pulses are 2+ on the right side and 2+ on the left side. Heart sounds: Normal heart sounds. Pulmonary:      Effort: Pulmonary effort is normal. No respiratory distress. Breath sounds: Normal breath sounds. Abdominal:      General: Bowel sounds are normal.      Palpations: Abdomen is soft. Musculoskeletal:      Left ankle: No swelling. No tenderness. Normal range of motion. Normal pulse. Legs:    Skin:     General: Skin is warm and dry. Capillary Refill: Capillary refill takes less than 2 seconds. Neurological:      Mental Status: She is alert and oriented to person, place, and time. Assessment/Plan:    Rojelio Edwards was seen today for other.     Diagnoses and all orders for this visit:    Right leg swelling  -     Vascular duplex lower extremity venous right; Future  STAT venous US ordered to rule out DVT. Right calf pain  -     Vascular duplex lower extremity venous right; Future  As above. Pruritic rash  -     triamcinolone (KENALOG) 0.1 % cream; Apply topically 2 times daily. Apply cream as directed. Call if symptoms worsen or fail to improve. On this date, 6/14/22, I have spent 30 minutes reviewing previous notes, test results and face to face with the patient discussing the diagnosis and importance of compliance with the treatment plan as well as documenting on the day of the visit. An electronic signature was used to authenticate this note. LLOYD Agosto    Medication Reconciliation:  Current Medications Verified: Current medications/ immunizations were reviewed, including purpose, with patient. Family History, Social History, Current and Past Medical History was reviewed with patient and updated at today's office visit. Medication Reconciliation list was given to patient/ family. Patient was advised to discard old medication lists and provide all providers with current list at each visit and carry list with them in case of emergency.       LLOYD Agosto Headache Monitoring: I recommended monitoring the headaches for now. There is no evidence of increased intracranial pressure. They were instructed to call if the headaches are worsening.

## 2022-06-15 ENCOUNTER — HOSPITAL ENCOUNTER (OUTPATIENT)
Dept: ULTRASOUND IMAGING | Age: 72
Discharge: HOME | End: 2022-06-18
Payer: MEDICARE

## 2022-06-15 DIAGNOSIS — M79.89 RIGHT LEG SWELLING: ICD-10-CM

## 2022-06-15 DIAGNOSIS — M79.661 RIGHT CALF PAIN: ICD-10-CM

## 2022-06-15 PROCEDURE — 93971 EXTREMITY STUDY: CPT

## 2022-06-17 NOTE — PROGRESS NOTES
Name: Lan Whitley  YOB: 1950  Gender: female  MRN: [id number]    CC:   Chief Complaint   Patient presents with    Shoulder Pain     right shoulder pain, left shoulder recheck        HPI: Patient presents for follow-up of left shoulder pain. She was last given a left AC joint injection on 6- which gave good relief. She is also previously been seen for the right shoulder. Currently left is greater than right. She notes pain is worse moving backwards and continues to be along the superior aspect of the shoulder. She does note some interference with sleep. She denies weakness. She has been modifying her activities and does not do heavy lifting.       Allergies   Allergen Reactions    Morphine Other (See Comments)     \"I almost checked out\"-Trendelenburg  \"I almost checked out\"-Trendelenburg    Nitrofurantoin Diarrhea     And Nausea    Oxycodone-Acetaminophen Diarrhea    Propranolol Other (See Comments)     \"Didn't sleep for 2 days and  I saw green splotches on 2 different days \"  \"Didn't sleep for 2 days and  I saw green splotches on 2 different days \"    Sulfa Antibiotics Other (See Comments)     As a child/ unknown reactions  As a child/ unknown reactions     Past Medical History:   Diagnosis Date    Acoustic neuroma ([facility] [address] 2011    Followed by Dr Liane Jewell cell carcinoma     Multiple    Depression     Environmental allergies     Seasonal    Epiploic appendagitis Dec 2014    Per CT Scan    GERD (gastroesophageal reflux disease)     Hiatal hernia 01/26/2017    Per Endoscopy    Hypothyroidism (acquired)     Insomnia     Internal hemorrhoids 01/26/2017    Per Colonoscopy    [facility]    R Lower Leg    Menopause     Mild coronary artery disease 02/25/2019    Per Cath    Mixed hyperlipidemia     Neurogenic bladder     Osteopenia 2012    managed with Prolia- last injection /2017    PONV (postoperative nausea and vomiting)     Psoriasis     Situational anxiety     Vitamin D deficiency      Past Surgical History:   Procedure Laterality Date    CARDIAC CATHETERIZATION  02/22/2019    CATARACT REMOVAL Bilateral 1/2022, 2/2022    COLONOSCOPY  4/2005 & 1/26/17    Tortuous Colon & Internal Hemorrhoids    MALIGNANT SKIN LESION EXCISION  10/2010    Hx of Basal Cell Carcinoma - L Chest & Nose    MALIGNANT SKIN LESION EXCISION  10/31/2018    R Lower Leg    ORTHOPEDIC SURGERY Right 8/27/15    Trigger Finger Release    ORTHOPEDIC SURGERY Right 9/8/15    Interphalangeal Resection Arthroplasty Foot    ORTHOPEDIC SURGERY Left 02/16/2017    Achilles Reconstruction    GILBERTO AND BSO (CERVIX REMOVED)  1994    UPPER GASTROINTESTINAL ENDOSCOPY  01/26/2017    Hiatal Hernia     Family History   Problem Relation Age of Onset    Stroke Mother     Hypertension Father     Elevated Lipids Mother     Heart Failure Father     Elevated Lipids Father     Diabetes Sister 54        Surgically Induced, Diabetic Coma    Breast Cancer Neg Hx     Heart Disease Father         CAD     Social History     Socioeconomic History    Marital status:      Spouse name: Not on file    Number of children: Not on file    Years of education: Not on file    Highest education level: Not on file   Occupational History    Not on file   Tobacco Use    Smoking status: Never Smoker    Smokeless tobacco: Never Used   Substance and Sexual Activity    Alcohol use: Yes     Alcohol/week: 2.0 standard drinks    Drug use: No    Sexual activity: Not on file   Other Topics Concern    Not on file   Social History Narrative    Not on file     Social Determinants of Health     Financial Resource Strain:     Difficulty of Paying Living Expenses: Not on file   Food Insecurity:     Worried About Running Out of Food in the Last Year: Not on file    Belle of Food in the Last Year: Not on file   Transportation Needs:     Lack of Transportation (Medical):  Not on file    Lack of Transportation (Non-Medical): Not on file   Physical Activity:     Days of Exercise per Week: Not on file    Minutes of Exercise per Session: Not on file   Stress:     Feeling of Stress : Not on file   Social Connections:     Frequency of Communication with Friends and Family: Not on file    Frequency of Social Gatherings with Friends and Family: Not on file    Attends Mosque Services: Not on file    Active Member of [address] [place] [address] or Organizations: Not on file    Attends Club or Organization Meetings: Not on file    Marital Status: Not on file   Intimate Partner Violence:     Fear of Current or Ex-Partner: Not on file    Emotionally Abused: Not on file    Physically Abused: Not on file    Sexually Abused: Not on file   Housing Stability:     Unable to Pay for Housing in the Last Year: Not on file    Number of [place] in the Last Year: Not on file    Unstable Housing in the Last Year: Not on file        No flowsheet data found. Review of Systems  Non-contributory    PE:    Full range of motion of the shoulders. Appropriate strength. Mild tenderness of the biceps tendon. Significant pain with palpation at the [facility] joint. Distal motor function, sensation, pulses intact. XRAYElta Minder, outlet and axillary radiographs of the right shoulder were obtained 6-14-22 and reviewed today. They show severe arthropathy of the [facility] joint. There is downsloping of the acromion with acromial bone spurring. Mild cyst formation in the humeral head. A/Plan:     ICD-10-CM    1. Right shoulder pain, unspecified chronicity  M25.511 XR SHOULDER RIGHT (MIN 2 VIEWS)     methylPREDNISolone acetate (DEPO-MEDROL) injection 80 mg     lidocaine 1 % injection 4 mL     DRAIN/INJECT INTERMEDIATE JOINT/BURSA   2. AC joint arthropathy  M19.019 methylPREDNISolone acetate (DEPO-MEDROL) injection 80 mg     lidocaine 1 % injection 4 mL     DRAIN/INJECT INTERMEDIATE JOINT/BURSA   3.  Left shoulder pain, unspecified chronicity M25.512 methylPREDNISolone acetate (DEPO-MEDROL) injection 80 mg     lidocaine 1 % injection 4 mL     DRAIN/INJECT INTERMEDIATE JOINT/BURSA        Discussed with the patient that given her increased risk of complications and mortality secondary to GERD, hypothyroidism, neurogenic bladder we would like to proceed with conservative measures. We discussed ultimately a distal clavicle resection is an option if her pain becomes unbearable or injections stop working. Today we will proceed with bilateral AC joint injection. Follow-up as needed. PROCEDURE NOTE:  After discussion of risks and benefits including, but not limited to pain, infection, steroid flare, fat necrosis, skin discoloration, and injury to blood vessels or nerves, the patient verbally consented to proceed with an acromioclavicular [facility]) joint injection. The affected right shoulder was sterilely prepped in standard fashion and injected with 2 cc of 1% Lidocaine in the subcutanesou tissue with a 25 gauge needle. The [facility] joint was then penetrated with the needle and 1 cc of Depo-Medrol (40mg/ml) was injected into the joint. The patient tolerated the injection well. PROCEDURE NOTE:  After discussion of risks and benefits including, but not limited to pain, infection, steroid flare, fat necrosis, skin discoloration, and injury to blood vessels or nerves, the patient verbally consented to proceed with an acromioclavicular [facility]) joint injection. The affected left shoulder was sterilely prepped in standard fashion and injected with 2 cc of 1% Lidocaine in the subcutanesou tissue with a 25 gauge needle. The [facility] joint was then penetrated with the needle and 1 cc of Depo-Medrol (40mg/ml) was injected into the joint. The patient tolerated the injection well. Return if symptoms worsen or fail to improve.         Mariola Rosarioma  06/17/22

## 2022-06-21 ENCOUNTER — TELEPHONE (OUTPATIENT)
Dept: CARDIOLOGY CLINIC | Age: 72
End: 2022-06-21

## 2022-06-22 NOTE — TELEPHONE ENCOUNTER
Nuclear stress test looks good.  No major abnormalities.  Keep follow up visit. Called patient who voiced understanding of Dr. Ilana Vale' response.

## 2022-07-08 DIAGNOSIS — N18.31 STAGE 3A CHRONIC KIDNEY DISEASE (HCC): ICD-10-CM

## 2022-07-08 DIAGNOSIS — E03.9 ACQUIRED HYPOTHYROIDISM: ICD-10-CM

## 2022-07-08 DIAGNOSIS — D69.6 THROMBOCYTOPENIA (HCC): Primary | ICD-10-CM

## 2022-07-08 DIAGNOSIS — E78.2 MIXED HYPERLIPIDEMIA: ICD-10-CM

## 2022-07-08 DIAGNOSIS — R73.01 ELEVATED FASTING GLUCOSE: ICD-10-CM

## 2022-07-10 ASSESSMENT — ENCOUNTER SYMPTOMS
SORE THROAT: 0
DIARRHEA: 0
COUGH: 0
ABDOMINAL PAIN: 0
PHOTOPHOBIA: 0
CONSTIPATION: 0
ABDOMINAL DISTENTION: 0

## 2022-07-10 NOTE — PROGRESS NOTES
[facility] CARDIOLOGY  [address], [address]  PHONE: [phone]        NAME:  Lazara Graves  : 1950  MRN: [id number]     PCP:  Azael Mir PA-C      SUBJECTIVE:   Lazara Graves is a 74 y.o. female seen for a follow up visit regarding the following:     Chief Complaint   Patient presents with    Follow-up Chronic Condition    Results     nuke and echo follow up for chest tightness     HPI:    She presented recently to [facility] [place] [facility], last seen in the catheterization lab 2019 in the setting of recurrent substernal chest discomfort despite a negative nuclear stress test.    Cardiac cath 2019:  1. Minimal coronary disease as described above. 2.  Noncardiac chest symptoms most likely. 3.  Normal left ventricular regional wall motion and ejection fraction. 4.  Elevated left ventricular end-diastolic pressure. Under lots of situational stress recently with recurrent SSCP associated with severely stressful situations. Having recurrent CP, SOB, DAVIS, and acute diaphoresis with low level stress. Exam and ECG benign. Wonders if symptoms could be due to antidepressant but doesn't want to stop it at present. Echo 2022:  Left Ventricle Normal left ventricular systolic function with a visually estimated EF of 60 - 65%. Left ventricle size is normal. Normal wall thickness. Normal wall motion. Normal diastolic function. Left Atrium Left atrium size is normal. LA Vol Index is  27 ml/m2. Right Ventricle Right ventricle size is normal. Normal wall thickness. RV basal diameter is 3.0 cm. RV mid diameter is 2.1 cm. Normal systolic function. Right Atrium Right atrium size is normal.   Aortic Valve Valve structure is normal. No regurgitation. No stenosis. Mitral Valve Valve structure is normal. Trace regurgitation. No stenosis noted. Tricuspid Valve Valve structure is normal. Mild regurgitation. No stenosis noted.  The estimated RVSP is 17 mmHg. Pulmonic Valve Valve structure is normal. Trace regurgitation. No stenosis noted. Aorta Normal sized annulus, sinus of Valsalva, ascending aorta, aortic arch, descending aorta and abdominal aorta. Pericardium No pericardial effusion. Nuclear stress 6/2022:    Nuclear Findings: LVEF measures 68%. LV perfusion is normal.    Nuclear Findings: Normal left ventricular systolic function post-stress. LVEF measures 68%.   Nuclear Findings: Normal treadmill myocardial perfusion study at 97 %% PHR.   ECG: Resting ECG demonstrates normal sinus rhythm.   Stress Test: A Ishan protocol stress test was performed. Past Medical History, Past Surgical History, Family history, Social History, and Medications were all reviewed with the patient today and updated as necessary. Current Outpatient Medications   Medication Sig Dispense Refill    nitroGLYCERIN (NITROSTAT) 0.4 MG SL tablet Place 1 SL under the tongue q 5 min prn cp, max 3 SL in a 15-min time period. Call 911 if no relief after the 1st SL. 25 tablet 3    esomeprazole (NEXIUM) 40 MG delayed release capsule TAKE 1 CAPSULE BY MOUTH EVERY DAY      latanoprost (XALATAN) 0.005 % ophthalmic solution INSTILL 1 DROP IN BOTH EYES EVERY EVENING      ondansetron (ZOFRAN-ODT) 4 MG disintegrating tablet TAKE 1 TABLET BY ORAL ROUTE EVERY 6-8 HOURS AS NEEDED FOR NAUSEA AND PLACE ON TOP OF THE TONGUE WHERE IT WILL DISSOLVE      triamcinolone (KENALOG) 0.1 % cream Apply topically 2 times daily. 30 g 0    clonazePAM (KLONOPIN) 1 MG tablet Take 1/2-1 tablet po q AM as needed for anxiety & 1.5 tabets po q hs for sleep.  75 tablet 0    aspirin 81 MG EC tablet Take 81 mg by mouth      Biotin 2.5 MG CAPS Take 1 tablet by mouth       Calcium Carbonate-Vitamin D (CALCIUM-VITAMIN D) 600-125 MG-UNIT TABS Take 1 tablet by mouth 2 times daily       Cetirizine HCl 10 MG CAPS Take 1 capsule by mouth      vitamin D3 (CHOLECALCIFEROL) 125 MCG (5000 UT) TABS tablet Take 5,000 Units by mouth daily       clobetasol (TEMOVATE) 0.05 % ointment APPLY TO AFFECTED AREAS TWICE DAILY WHEN FLARES      denosumab (PROLIA) 60 MG/ML SOSY SC injection Inject 60 mg into the skin every 6 months       famotidine (PEPCID) 40 MG tablet Take 40 mg by mouth as needed       fluticasone (FLONASE) 50 MCG/ACT nasal spray 2 sprays by Nasal route daily      levothyroxine (SYNTHROID) 50 MCG tablet 50 mcg Daily Brand medically necessary.  polyethylene glycol (GLYCOLAX) 17 GM/SCOOP powder Take 17 g by mouth daily      vilazodone HCl (VIIBRYD) 20 MG TABS Take 20 mg by mouth daily (with breakfast)      docusate (COLACE, DULCOLAX) 100 MG CAPS Take 100 mg by mouth daily (Patient not taking: Reported on 7/13/2022)      lubiprostone (AMITIZA) 8 MCG CAPS capsule Take 8 mcg by mouth 2 times daily (with meals) (Patient not taking: Reported on 7/13/2022)      KRILL OIL PO Take 1 tablet by mouth Daily with lunch (Patient not taking: Reported on 7/13/2022)       No current facility-administered medications for this visit.         Allergies   Allergen Reactions    Morphine Other (See Comments)     \"I almost checked out\"-Trendelenburg  \"I almost checked out\"-Trendelenburg    Nitrofurantoin Diarrhea     And Nausea    Oxycodone-Acetaminophen Diarrhea    Propranolol Other (See Comments)     \"Didn't sleep for 2 days and  I saw green splotches on 2 different days \"  \"Didn't sleep for 2 days and  I saw green splotches on 2 different days \"    Sulfa Antibiotics Other (See Comments)     As a child/ unknown reactions  As a child/ unknown reactions       Patient Active Problem List    Diagnosis Date Noted    Malignant melanoma of right lower extremity including hip ([facility] [address] 10/19/2018     Priority: Medium     Overview Note:     Formatting of this note might be different from the original.  Added automatically from request for surgery [id number]      Neurogenic bladder 09/09/2014     Priority: Medium    Osteoarthritis of left AC (acromioclavicular) joint 06/17/2021    Left shoulder pain 06/17/2021    Chest tightness 10/10/2018    Shortness of breath 10/10/2018    Hypothyroidism (acquired)      Overview Note:     manage well with Synthroid        Moderate recurrent major depression ([facility] [address] 02/22/2016    Gastroesophageal reflux disease without esophagitis 02/22/2016    Vitamin D deficiency 10/24/2014    Situational stress 10/24/2014    Osteopenia 10/24/2014    Mixed hyperlipidemia 10/24/2014        Past Surgical History:   Procedure Laterality Date    CARDIAC CATHETERIZATION  02/22/2019    CATARACT REMOVAL Bilateral 1/2022, 2/2022    COLONOSCOPY  4/2005 & 1/26/17    Tortuous Colon & Internal Hemorrhoids    MALIGNANT SKIN LESION EXCISION  10/2010    Hx of Basal Cell Carcinoma - L Chest & Nose    MALIGNANT SKIN LESION EXCISION  10/31/2018    R Lower Leg    ORTHOPEDIC SURGERY Right 8/27/15    Trigger Finger Release    ORTHOPEDIC SURGERY Right 9/8/15    Interphalangeal Resection Arthroplasty Foot    ORTHOPEDIC SURGERY Left 02/16/2017    Achilles Reconstruction    GILBERTO AND BSO (CERVIX REMOVED)  1994    UPPER GASTROINTESTINAL ENDOSCOPY  01/26/2017    Hiatal Hernia       Family History   Problem Relation Age of Onset    Stroke Mother     Hypertension Father     Elevated Lipids Mother     Heart Failure Father     Elevated Lipids Father     Diabetes Sister 54        Surgically Induced, Diabetic Coma    Breast Cancer Neg Hx     Heart Disease Father         CAD        Social History     Tobacco Use    Smoking status: Never Smoker    Smokeless tobacco: Never Used   Substance Use Topics    Alcohol use: Yes     Alcohol/week: 2.0 standard drinks       ROS:    Review of Systems   Constitutional: Negative for appetite change, chills, diaphoresis and fatigue. HENT: Negative for congestion, mouth sores, nosebleeds, sore throat and tinnitus.     Eyes: Negative for photophobia and visual disturbance. Respiratory: Negative for cough and shortness of breath. Cardiovascular: Negative for chest pain, palpitations and leg swelling. Gastrointestinal: Negative for abdominal distention, abdominal pain, constipation and diarrhea. Endocrine: Negative for cold intolerance, heat intolerance, polydipsia and polyuria. Genitourinary: Negative for dysuria and hematuria. Musculoskeletal: Negative for arthralgias, joint swelling and myalgias. Skin: Negative for rash. Allergic/Immunologic: Negative for environmental allergies and food allergies. Neurological: Negative for dizziness, seizures, syncope and light-headedness. Hematological: Negative for adenopathy. Does not bruise/bleed easily. Psychiatric/Behavioral: Negative for agitation, behavioral problems, dysphoric mood and hallucinations. The patient is not nervous/anxious. Severe situational stress, anxiety        PHYSICAL EXAM:     Vitals:    07/13/22 1036   BP: 112/72   Pulse: 62   Weight: 133 lb (60.3 kg)   Height: 5' 4\" (1.626 m)      Wt Readings from Last 3 Encounters:   07/13/22 133 lb (60.3 kg)   07/08/22 132 lb (59.9 kg)   06/17/22 134 lb (60.8 kg)      BP Readings from Last 3 Encounters:   07/13/22 112/72   07/08/22 118/62   06/17/22 134/80        Physical Exam  Constitutional:       Appearance: Normal appearance. She is normal weight. HENT:      Head: Normocephalic and atraumatic. Nose: Nose normal.      Mouth/Throat:      Mouth: Mucous membranes are moist.      Pharynx: Oropharynx is clear. Eyes:      Extraocular Movements: Extraocular movements intact. Pupils: Pupils are equal, round, and reactive to light. Neck:      Vascular: No carotid bruit or JVD. Cardiovascular:      Rate and Rhythm: Normal rate and regular rhythm. Heart sounds: No murmur heard. No friction rub. No gallop. Pulmonary:      Effort: Pulmonary effort is normal.      Breath sounds: Normal breath sounds. No wheezing or rhonchi. Abdominal:      General: Abdomen is flat. Bowel sounds are normal. There is no distension. Palpations: Abdomen is soft. Tenderness: There is no abdominal tenderness. Musculoskeletal:         General: No swelling. Normal range of motion. Cervical back: Normal range of motion and neck supple. No tenderness. Skin:     General: Skin is warm and dry. Neurological:      General: No focal deficit present. Mental Status: She is alert and oriented to person, place, and time. Mental status is at baseline. Psychiatric:         Mood and Affect: Mood normal.         Behavior: Behavior normal.          Medical problems and test results were reviewed with the patient today.        Lab Results   Component Value Date    CHOL 201 (H) 07/12/2022    CHOL 206 (H) 04/08/2022    CHOL 206 (H) 01/04/2022     Lab Results   Component Value Date    TRIG 186 (H) 07/12/2022    TRIG 209 (H) 04/08/2022    TRIG 150 (H) 01/04/2022     Lab Results   Component Value Date    HDL 46 07/12/2022    HDL 40 04/08/2022    HDL 43 01/04/2022     Lab Results   Component Value Date    LDLCALC 117.8 (H) 07/12/2022    LDLCALC 129 (H) 04/08/2022    LDLCALC 136 (H) 01/04/2022     Lab Results   Component Value Date    LABVLDL 37.2 (H) 07/12/2022    LABVLDL 47 (H) 05/19/2020    LABVLDL 36 01/02/2020    VLDL 37 04/08/2022    VLDL 27 01/04/2022    VLDL 45 (H) 09/09/2021     Lab Results   Component Value Date    CHOLHDLRATIO 4.4 07/12/2022       Lab Results   Component Value Date/Time     07/12/2022 02:18 PM    K 4.3 07/12/2022 02:18 PM     07/12/2022 02:18 PM    CO2 26 07/12/2022 02:18 PM    BUN 18 07/12/2022 02:18 PM    CREATININE 1.10 07/12/2022 02:18 PM    GLUCOSE 100 07/12/2022 02:18 PM    CALCIUM 8.9 07/12/2022 02:18 PM        Recent Labs     07/12/22  1418   WBC 5.8   HGB 13.3   HCT 39.8   MCV 98.0*   *       Lab Results   Component Value Date    LABA1C 5.5 04/08/2022     Lab Results   Component Value Date     04/08/2022       No results found for: BNP     Lab Results   Component Value Date    TSH 4.570 (H) 04/08/2022        No results found for any visits on 07/13/22. ASSESSMENT and PLAN    Ayde Astorga was seen today for excessive sweating and palpitations. Diagnoses and all orders for this visit:    Chest tightness- recurrent, probably situational stress induced but recurrent and worrying her. Normal nuclear stress, normal echo. Follow clinically. Gastroesophageal reflux disease without esophagitis- continue meds with PCP surveillance    Hypothyroidism (acquired)- continue meds with PCP surveillance    Mixed hyperlipidemia- stable continue meds with outpatient     Shortness of breath - recurrent and worsening, exertional in nature. No volume overload or heart failure symptoms. Normal exam, echo, stress test and ECG . Follow clinically, excellent BP and HR, likely situational anxiety induced. Return in about 6 months (around 1/13/2023).          Alexis Sanchez MD  7/13/2022  11:08 AM

## 2022-07-12 ENCOUNTER — NURSE ONLY (OUTPATIENT)
Dept: INTERNAL MEDICINE CLINIC | Facility: CLINIC | Age: 72
End: 2022-07-12

## 2022-07-12 DIAGNOSIS — E78.2 MIXED HYPERLIPIDEMIA: ICD-10-CM

## 2022-07-12 DIAGNOSIS — D69.6 THROMBOCYTOPENIA (HCC): ICD-10-CM

## 2022-07-12 DIAGNOSIS — R73.01 ELEVATED FASTING GLUCOSE: ICD-10-CM

## 2022-07-12 DIAGNOSIS — N18.31 STAGE 3A CHRONIC KIDNEY DISEASE (HCC): ICD-10-CM

## 2022-07-12 DIAGNOSIS — E03.9 ACQUIRED HYPOTHYROIDISM: ICD-10-CM

## 2022-07-13 ENCOUNTER — OFFICE VISIT (OUTPATIENT)
Dept: CARDIOLOGY CLINIC | Age: 72
End: 2022-07-13
Payer: MEDICARE

## 2022-07-13 VITALS
BODY MASS INDEX: 22.71 KG/M2 | HEART RATE: 62 BPM | HEIGHT: 64 IN | SYSTOLIC BLOOD PRESSURE: 112 MMHG | DIASTOLIC BLOOD PRESSURE: 72 MMHG | WEIGHT: 133 LBS

## 2022-07-13 DIAGNOSIS — R07.89 CHEST TIGHTNESS: ICD-10-CM

## 2022-07-13 DIAGNOSIS — E78.2 MIXED HYPERLIPIDEMIA: ICD-10-CM

## 2022-07-13 DIAGNOSIS — K21.9 GASTROESOPHAGEAL REFLUX DISEASE WITHOUT ESOPHAGITIS: ICD-10-CM

## 2022-07-13 DIAGNOSIS — R06.02 SHORTNESS OF BREATH: Primary | ICD-10-CM

## 2022-07-13 DIAGNOSIS — E03.9 HYPOTHYROIDISM (ACQUIRED): ICD-10-CM

## 2022-07-13 DIAGNOSIS — F43.9 SITUATIONAL STRESS: ICD-10-CM

## 2022-07-13 LAB
ALBUMIN SERPL-MCNC: 3.9 G/DL (ref 3.2–4.6)
ALBUMIN/GLOB SERPL: 1.5 {RATIO} (ref 1.2–3.5)
ALP SERPL-CCNC: 74 U/L (ref 50–136)
ALT SERPL-CCNC: 22 U/L (ref 12–65)
ANION GAP SERPL CALC-SCNC: 6 MMOL/L (ref 7–16)
AST SERPL-CCNC: 20 U/L (ref 15–37)
BASOPHILS # BLD: 0.1 K/UL (ref 0–0.2)
BASOPHILS NFR BLD: 2 % (ref 0–2)
BILIRUB SERPL-MCNC: 1.1 MG/DL (ref 0.2–1.1)
BUN SERPL-MCNC: 18 MG/DL (ref 8–23)
CALCIUM SERPL-MCNC: 8.9 MG/DL (ref 8.3–10.4)
CHLORIDE SERPL-SCNC: 113 MMOL/L (ref 98–107)
CHOLEST SERPL-MCNC: 201 MG/DL
CO2 SERPL-SCNC: 26 MMOL/L (ref 21–32)
CREAT SERPL-MCNC: 1.1 MG/DL (ref 0.6–1)
DIFFERENTIAL METHOD BLD: ABNORMAL
EOSINOPHIL # BLD: 0.1 K/UL (ref 0–0.8)
EOSINOPHIL NFR BLD: 1 % (ref 0.5–7.8)
ERYTHROCYTE [DISTWIDTH] IN BLOOD BY AUTOMATED COUNT: 15.1 % (ref 11.9–14.6)
GLOBULIN SER CALC-MCNC: 2.6 G/DL (ref 2.3–3.5)
GLUCOSE SERPL-MCNC: 100 MG/DL (ref 65–100)
HCT VFR BLD AUTO: 39.8 % (ref 35.8–46.3)
HDLC SERPL-MCNC: 46 MG/DL (ref 40–60)
HDLC SERPL: 4.4 {RATIO}
HGB BLD-MCNC: 13.3 G/DL (ref 11.7–15.4)
IMM GRANULOCYTES # BLD AUTO: 0 K/UL (ref 0–0.5)
IMM GRANULOCYTES NFR BLD AUTO: 0 % (ref 0–5)
LDLC SERPL CALC-MCNC: 117.8 MG/DL
LYMPHOCYTES # BLD: 2.4 K/UL (ref 0.5–4.6)
LYMPHOCYTES NFR BLD: 42 % (ref 13–44)
MCH RBC QN AUTO: 32.8 PG (ref 26.1–32.9)
MCHC RBC AUTO-ENTMCNC: 33.4 G/DL (ref 31.4–35)
MCV RBC AUTO: 98 FL (ref 79.6–97.8)
MONOCYTES # BLD: 0.6 K/UL (ref 0.1–1.3)
MONOCYTES NFR BLD: 10 % (ref 4–12)
NEUTS SEG # BLD: 2.6 K/UL (ref 1.7–8.2)
NEUTS SEG NFR BLD: 45 % (ref 43–78)
NRBC # BLD: 0 K/UL (ref 0–0.2)
PLATELET # BLD AUTO: 144 K/UL (ref 150–450)
PMV BLD AUTO: 12.2 FL (ref 9.4–12.3)
POTASSIUM SERPL-SCNC: 4.3 MMOL/L (ref 3.5–5.1)
PROT SERPL-MCNC: 6.5 G/DL (ref 6.3–8.2)
RBC # BLD AUTO: 4.06 M/UL (ref 4.05–5.2)
SODIUM SERPL-SCNC: 145 MMOL/L (ref 136–145)
TRIGL SERPL-MCNC: 186 MG/DL (ref 35–150)
TSH, 3RD GENERATION: 1.51 UIU/ML (ref 0.36–3.74)
VLDLC SERPL CALC-MCNC: 37.2 MG/DL (ref 6–23)
WBC # BLD AUTO: 5.8 K/UL (ref 4.3–11.1)

## 2022-07-13 PROCEDURE — 3017F COLORECTAL CA SCREEN DOC REV: CPT | Performed by: INTERNAL MEDICINE

## 2022-07-13 PROCEDURE — G8420 CALC BMI NORM PARAMETERS: HCPCS | Performed by: INTERNAL MEDICINE

## 2022-07-13 PROCEDURE — G8427 DOCREV CUR MEDS BY ELIG CLIN: HCPCS | Performed by: INTERNAL MEDICINE

## 2022-07-13 PROCEDURE — 1123F ACP DISCUSS/DSCN MKR DOCD: CPT | Performed by: INTERNAL MEDICINE

## 2022-07-13 PROCEDURE — G8399 PT W/DXA RESULTS DOCUMENT: HCPCS | Performed by: INTERNAL MEDICINE

## 2022-07-13 PROCEDURE — 99214 OFFICE O/P EST MOD 30 MIN: CPT | Performed by: INTERNAL MEDICINE

## 2022-07-13 PROCEDURE — 1036F TOBACCO NON-USER: CPT | Performed by: INTERNAL MEDICINE

## 2022-07-13 PROCEDURE — 1090F PRES/ABSN URINE INCON ASSESS: CPT | Performed by: INTERNAL MEDICINE

## 2022-07-13 RX ORDER — NITROGLYCERIN 0.4 MG/1
TABLET SUBLINGUAL
Qty: 25 TABLET | Refills: 3 | Status: SHIPPED | OUTPATIENT
Start: 2022-07-13

## 2022-07-13 ASSESSMENT — ENCOUNTER SYMPTOMS: SHORTNESS OF BREATH: 0

## 2022-07-18 ENCOUNTER — OFFICE VISIT (OUTPATIENT)
Dept: INTERNAL MEDICINE CLINIC | Facility: CLINIC | Age: 72
End: 2022-07-18
Payer: MEDICARE

## 2022-07-18 VITALS
WEIGHT: 130 LBS | OXYGEN SATURATION: 99 % | HEIGHT: 64 IN | SYSTOLIC BLOOD PRESSURE: 110 MMHG | BODY MASS INDEX: 22.2 KG/M2 | HEART RATE: 72 BPM | TEMPERATURE: 97.9 F | DIASTOLIC BLOOD PRESSURE: 70 MMHG

## 2022-07-18 DIAGNOSIS — F43.9 SITUATIONAL STRESS: ICD-10-CM

## 2022-07-18 DIAGNOSIS — Z00.00 MEDICARE ANNUAL WELLNESS VISIT, SUBSEQUENT: Primary | ICD-10-CM

## 2022-07-18 DIAGNOSIS — F51.04 CHRONIC INSOMNIA: ICD-10-CM

## 2022-07-18 DIAGNOSIS — E78.2 MIXED HYPERLIPIDEMIA: ICD-10-CM

## 2022-07-18 DIAGNOSIS — N18.31 STAGE 3A CHRONIC KIDNEY DISEASE (HCC): ICD-10-CM

## 2022-07-18 DIAGNOSIS — E03.9 ACQUIRED HYPOTHYROIDISM: ICD-10-CM

## 2022-07-18 DIAGNOSIS — K21.9 GASTROESOPHAGEAL REFLUX DISEASE WITHOUT ESOPHAGITIS: ICD-10-CM

## 2022-07-18 DIAGNOSIS — R73.01 ELEVATED FASTING GLUCOSE: ICD-10-CM

## 2022-07-18 DIAGNOSIS — Z12.31 SCREENING MAMMOGRAM FOR BREAST CANCER: ICD-10-CM

## 2022-07-18 DIAGNOSIS — D69.6 THROMBOCYTOPENIA (HCC): ICD-10-CM

## 2022-07-18 DIAGNOSIS — F33.1 MODERATE RECURRENT MAJOR DEPRESSION (HCC): ICD-10-CM

## 2022-07-18 PROCEDURE — 99215 OFFICE O/P EST HI 40 MIN: CPT | Performed by: PHYSICIAN ASSISTANT

## 2022-07-18 PROCEDURE — 1036F TOBACCO NON-USER: CPT | Performed by: PHYSICIAN ASSISTANT

## 2022-07-18 PROCEDURE — 1090F PRES/ABSN URINE INCON ASSESS: CPT | Performed by: PHYSICIAN ASSISTANT

## 2022-07-18 PROCEDURE — G8428 CUR MEDS NOT DOCUMENT: HCPCS | Performed by: PHYSICIAN ASSISTANT

## 2022-07-18 PROCEDURE — G0439 PPPS, SUBSEQ VISIT: HCPCS | Performed by: PHYSICIAN ASSISTANT

## 2022-07-18 PROCEDURE — G8399 PT W/DXA RESULTS DOCUMENT: HCPCS | Performed by: PHYSICIAN ASSISTANT

## 2022-07-18 PROCEDURE — 1123F ACP DISCUSS/DSCN MKR DOCD: CPT | Performed by: PHYSICIAN ASSISTANT

## 2022-07-18 PROCEDURE — G8420 CALC BMI NORM PARAMETERS: HCPCS | Performed by: PHYSICIAN ASSISTANT

## 2022-07-18 PROCEDURE — 3017F COLORECTAL CA SCREEN DOC REV: CPT | Performed by: PHYSICIAN ASSISTANT

## 2022-07-18 RX ORDER — LEVOTHYROXINE SODIUM 50 MCG
50 TABLET ORAL DAILY
Qty: 90 TABLET | Refills: 3 | Status: CANCELLED | OUTPATIENT
Start: 2022-07-18

## 2022-07-18 RX ORDER — VILAZODONE HYDROCHLORIDE 20 MG/1
30 TABLET ORAL
Qty: 135 TABLET | Refills: 3 | Status: CANCELLED | OUTPATIENT
Start: 2022-07-18

## 2022-07-18 RX ORDER — FAMOTIDINE 40 MG/1
40 TABLET, FILM COATED ORAL 2 TIMES DAILY
Qty: 180 TABLET | Refills: 3 | Status: CANCELLED | OUTPATIENT
Start: 2022-07-18

## 2022-07-18 ASSESSMENT — ENCOUNTER SYMPTOMS
SHORTNESS OF BREATH: 0
CONSTIPATION: 0
DIARRHEA: 1

## 2022-07-18 ASSESSMENT — PATIENT HEALTH QUESTIONNAIRE - PHQ9
SUM OF ALL RESPONSES TO PHQ QUESTIONS 1-9: 7
SUM OF ALL RESPONSES TO PHQ9 QUESTIONS 1 & 2: 2
3. TROUBLE FALLING OR STAYING ASLEEP: 1
7. TROUBLE CONCENTRATING ON THINGS, SUCH AS READING THE NEWSPAPER OR WATCHING TELEVISION: 1
5. POOR APPETITE OR OVEREATING: 1
SUM OF ALL RESPONSES TO PHQ QUESTIONS 1-9: 7
9. THOUGHTS THAT YOU WOULD BE BETTER OFF DEAD, OR OF HURTING YOURSELF: 0
10. IF YOU CHECKED OFF ANY PROBLEMS, HOW DIFFICULT HAVE THESE PROBLEMS MADE IT FOR YOU TO DO YOUR WORK, TAKE CARE OF THINGS AT HOME, OR GET ALONG WITH OTHER PEOPLE: 0
6. FEELING BAD ABOUT YOURSELF - OR THAT YOU ARE A FAILURE OR HAVE LET YOURSELF OR YOUR FAMILY DOWN: 1
4. FEELING TIRED OR HAVING LITTLE ENERGY: 1
1. LITTLE INTEREST OR PLEASURE IN DOING THINGS: 1
8. MOVING OR SPEAKING SO SLOWLY THAT OTHER PEOPLE COULD HAVE NOTICED. OR THE OPPOSITE, BEING SO FIGETY OR RESTLESS THAT YOU HAVE BEEN MOVING AROUND A LOT MORE THAN USUAL: 0
SUM OF ALL RESPONSES TO PHQ QUESTIONS 1-9: 7
2. FEELING DOWN, DEPRESSED OR HOPELESS: 1
SUM OF ALL RESPONSES TO PHQ QUESTIONS 1-9: 7

## 2022-07-18 ASSESSMENT — LIFESTYLE VARIABLES
HOW OFTEN DO YOU HAVE A DRINK CONTAINING ALCOHOL: 2-3 TIMES A WEEK
HOW MANY STANDARD DRINKS CONTAINING ALCOHOL DO YOU HAVE ON A TYPICAL DAY: 1 OR 2

## 2022-07-18 NOTE — PROGRESS NOTES
Humphrey Darnell (:  1950) is a 74 y.o. female,Established patient, here for evaluation of the following chief complaint(s):  Medicare AWV, Hypothyroidism, and Hyperlipidemia         ASSESSMENT/PLAN:  1. Gastroesophageal reflux disease without esophagitis  2. Chronic insomnia  The following orders have not been finalized:  -     clonazePAM (KLONOPIN) 1 MG tablet  3. Situational stress  The following orders have not been finalized:  -     clonazePAM (KLONOPIN) 1 MG tablet      No follow-ups on file. Subjective   SUBJECTIVE/OBJECTIVE:  Hyperlipidemia  Pertinent negatives include no chest pain, myalgias or shortness of breath. Review of Systems   Constitutional:  Positive for fatigue. Negative for unexpected weight change. Respiratory:  Negative for shortness of breath. Cardiovascular:  Negative for chest pain and palpitations. Gastrointestinal:  Positive for diarrhea. Negative for constipation. Endocrine: Positive for heat intolerance. Negative for cold intolerance. Negative for hair loss. Musculoskeletal:  Negative for myalgias. Objective   Physical Exam       On this date 2022 I have spent 60 minutes reviewing previous notes, test results and face to face with the patient discussing the diagnosis and importance of compliance with the treatment plan as well as documenting on the day of the visit. An electronic signature was used to authenticate this note.     --Vanessa Godinez MA

## 2022-07-18 NOTE — PATIENT INSTRUCTIONS
Completed [employer] testing for additional input on best options for depression management  Will consider a trial switch from [address] to something else or addition of secondary med like Rexulti (patient will research)  Reminded of previous experience with Nexium causing diarrhea but expressed approval if she wanted to retry  Recommended resuming a daily probiotic with consideration of [employer]-stress since I do feel a lot of her digestive issues are likely chronically aggravated by her ongoing stress  Encouraged to do monthly self breast exams  Reminded of the need to stay well hydrated with plenty of water  Suggest maintaining on Synthroid 50 mcg daily for now given optimal levels of TSH currently  Encouraged to resume a regular exercise program even if some of the exercise needs to be indoors during the hotter months  The SCRIPTS database was verified to have no suspicious activity before the issuance of a controlled substance prescription      Personalized Preventive Plan for [facility] - 7/18/2022  Medicare offers a range of preventive health benefits. Some of the tests and screenings are paid in full while other may be subject to a deductible, co-insurance, and/or copay. Some of these benefits include a comprehensive review of your medical history including lifestyle, illnesses that may run in your family, and various assessments and screenings as appropriate. After reviewing your medical record and screening and assessments performed today your provider may have ordered immunizations, labs, imaging, and/or referrals for you. A list of these orders (if applicable) as well as your Preventive Care list are included within your After Visit Summary for your review. Other Preventive Recommendations:    A preventive eye exam performed by an eye specialist is recommended every 1-2 years to screen for glaucoma; cataracts, macular degeneration, and other eye disorders.   A preventive dental visit is recommended every 6 months. Try to get at least 150 minutes of exercise per week or 10,000 steps per day on a pedometer . Order or download the FREE \"Exercise & Physical Activity: Your Everyday Guide\" from [employer]. Call 7-571.875.3345 or search [employer] online. You need 9159-1179 mg of calcium and 5995-1456 IU of vitamin D per day. It is possible to meet your calcium requirement with diet alone, but a vitamin D supplement is usually necessary to meet this goal.  When exposed to the sun, use a sunscreen that protects against both UVA and UVB radiation with an SPF of 30 or greater. Reapply every 2 to 3 hours or after sweating, drying off with a towel, or swimming. Always wear a seat belt when traveling in a car. Always wear a helmet when riding a bicycle or motorcycle.

## 2022-07-18 NOTE — PROGRESS NOTES
hydrated with plenty of water  Suggest maintaining on Synthroid 50 mcg daily for now given optimal levels of TSH currently  Encouraged to resume a regular exercise program even if some of the exercise needs to be indoors during the hotter months  The SCRIPTS database was verified to have no suspicious activity before the issuance of a controlled substance prescription      Personalized Preventive Plan for [facility] - 7/18/2022  Medicare offers a range of preventive health benefits. Some of the tests and screenings are paid in full while other may be subject to a deductible, co-insurance, and/or copay. Some of these benefits include a comprehensive review of your medical history including lifestyle, illnesses that may run in your family, and various assessments and screenings as appropriate. After reviewing your medical record and screening and assessments performed today your provider may have ordered immunizations, labs, imaging, and/or referrals for you. A list of these orders (if applicable) as well as your Preventive Care list are included within your After Visit Summary for your review. Other Preventive Recommendations:    A preventive eye exam performed by an eye specialist is recommended every 1-2 years to screen for glaucoma; cataracts, macular degeneration, and other eye disorders. A preventive dental visit is recommended every 6 months. Try to get at least 150 minutes of exercise per week or 10,000 steps per day on a pedometer . Order or download the FREE \"Exercise & Physical Activity: Your Everyday Guide\" from [employer]. Call 2-550.673.1497 or search [employer] online. You need 8209-3677 mg of calcium and 3784-7481 IU of vitamin D per day.  It is possible to meet your calcium requirement with diet alone, but a vitamin D supplement is usually necessary to meet this goal.  When exposed to the sun, use a sunscreen that protects against both UVA and UVB radiation with an SPF of 30 or greater. Reapply every 2 to 3 hours or after sweating, drying off with a towel, or swimming. Always wear a seat belt when traveling in a car. Always wear a helmet when riding a bicycle or motorcycle. Recommendations for Preventive Services Due: see orders and patient instructions/AVS.  Recommended screening schedule for the next 5-10 years is provided to the patient in written form: see Patient Instructions/AVS.       Return in 4 months (on 2022) for routine f/u. Subjective   The patient is a 74 y.o. female who is seen for a comprehensive physical exam.  Health behaviors: exercises sporadically, nonsmoker, diet is trying to restrict carbohydrates . Date of last physical exam: May 2011. The patient is menopausal and is status post hysterectomy. Current gynecologic symptoms include: none. I have reviewed the patient's medical history in detail and updated the computerized patient record. Previous Preventative Testing:  Mammogram: 2022 (results: normal); Pap Smear: 8 years ago (results: normal); Bone Density: 2022 (results: stable - osteopenia); Colonoscopy: 2017  (results: mildly abnormal but acceptable - tortuous colon & internal hemorrhoids); EKG: 2022 (results: normal - nonspecific ST-T wave changes); Chest X-Ray (if smoker): May 2019 (results: mildly abnormal but acceptable - mild scarring bilaterally);  Hemoccult: Never; Glaucoma Screenin (results: not currently available for review)     Immunizations: up to date and documented  Immunization History   Administered Date(s) Administered    COVID-19, PFIZER PURPLE top, DILUTE for use, (age 15 y+), 30mcg/0.3mL 2021, 2021    Influenza Trivalent 10/24/2014    Influenza Virus Vaccine 2011, 10/26/2012, 10/23/2013, 10/24/2014    Influenza, High Dose (Fluzone 65 yrs and older) 2015, 2016, 2017, 10/10/2018    Influenza, Quadv, adjuvanted, 65 yrs +, IM, PF (Fluad) 09/28/2020    Influenza, Triv, inactivated, subunit, adjuvanted, IM (Fluad 65 yrs and older) 10/18/2019    Pneumococcal Conjugate 13-valent (Edyth [place]) 02/22/2016    Pneumococcal Polysaccharide ([id number]) 11/08/2013    Tdap (Boostrix, Adacel) 12/05/2016    Zoster Live (Zostavax) 07/06/2011, 11/08/2012    Zoster Recombinant (Shingrix) 09/14/2019, 12/09/2019         The following acute and/or chronic problems were also addressed today:      Patient is here for follow-up of chronic insomnia. The current state of this condition is no significant medication side effects noted and well controlled on Clonazepam 1.5 mg (1.5 x 1 mg tablet) q hs - taking as prescribed. Current prescription was filled 6/13/22 for #75 with no refills. Patient is here for follow-up of elevated fasting glucose. The current state of this condition is asymptomatic and improved - not currently on medications for this problem. She has made efforts to reduce carbohydrates but admits to limited exercise due to heat. Lab Results   Component Value Date    LABA1C 5.5 04/08/2022     Lab Results   Component Value Date     04/08/2022       The patient is a 74 y.o. female who is seen for evaluation of hyperlipidemia. She was tested because of mixed hyperlipidemia. The current state of this condition is improved - not currently on medications for this problem.        Last Lipid Panel:   Lab Results   Component Value Date    CHOL 201 (H) 07/12/2022    CHOL 206 (H) 04/08/2022    CHOL 206 (H) 01/04/2022     Lab Results   Component Value Date    TRIG 186 (H) 07/12/2022    TRIG 209 (H) 04/08/2022    TRIG 150 (H) 01/04/2022     Lab Results   Component Value Date    HDL 46 07/12/2022    HDL 40 04/08/2022    HDL 43 01/04/2022     Lab Results   Component Value Date    LDLCALC 117.8 (H) 07/12/2022    LDLCALC 129 (H) 04/08/2022    LDLCALC 136 (H) 01/04/2022     Lab Results   Component Value Date    LABVLDL 37.2 (H) 07/12/2022 LABVLDL 47 (H) 05/19/2020    LABVLDL 36 01/02/2020    VLDL 37 04/08/2022    VLDL 27 01/04/2022    VLDL 45 (H) 09/09/2021     Lab Results   Component Value Date    CHOLHDLRATIO 4.4 07/12/2022       The patient is a 74 y.o. female who is seen for follow up of hypothyroidism. Current symptoms: diarrhea. Symptoms are symptoms have progressed to a point and plateaued. The patient is following treatment regimen with good compliance. Current treatment regimen consists of Synthroid 50 mcg daily and is taking it first thing in the AM on an empty stomach with no other food/liquids/other medications for at least 45-60 minutes. Records show that dosage was meant to be increased to 100 mcg (2 x 50 mcg tablets) x 2 days/week but patient admits she only remembered to make that adjustment for a few weeks. TSH   Date Value Ref Range Status   04/08/2022 4.570 (H) 0.450 - 4.500 uIU/mL Final   01/04/2022 3.170 0.450 - 4.500 uIU/mL Final   09/09/2021 2.600 0.450 - 4.500 uIU/mL Final       Patient is here for follow-up of chronic kidney disease. The current state of this condition is asymptomatic and poorly controlled - not currently on medications for this problem. Lab Results   Component Value Date    CREATININE 1.10 (H) 07/12/2022    BUN 18 07/12/2022     07/12/2022    K 4.3 07/12/2022     (H) 07/12/2022    CO2 26 07/12/2022       Patient is here for follow-up of mild thrombocytopenia. The current state of this condition is asymptomatic and stable - not currently on medications for this problem.     RBC   Date Value Ref Range Status   07/12/2022 4.06 4.05 - 5.2 M/uL Final   04/08/2022 3.95 3.77 - 5.28 x10E6/uL Final   01/04/2022 3.99 3.77 - 5.28 x10E6/uL Final     Hemoglobin   Date Value Ref Range Status   07/12/2022 13.3 11.7 - 15.4 g/dL Final   04/08/2022 13.3 11.1 - 15.9 g/dL Final   01/04/2022 13.2 11.1 - 15.9 g/dL Final     MCV   Date Value Ref Range Status   07/12/2022 98.0 (H) 79.6 - 97.8 FL Final 04/08/2022 97 79 - 97 fL Final   01/04/2022 95 79 - 97 fL Final     MCH   Date Value Ref Range Status   07/12/2022 32.8 26.1 - 32.9 PG Final   04/08/2022 33.7 (H) 26.6 - 33.0 pg Final   01/04/2022 33.1 (H) 26.6 - 33.0 pg Final     MCHC   Date Value Ref Range Status   07/12/2022 33.4 31.4 - 35.0 g/dL Final   04/08/2022 34.8 31.5 - 35.7 g/dL Final   01/04/2022 35.0 31.5 - 35.7 g/dL Final     RDW   Date Value Ref Range Status   07/12/2022 15.1 (H) 11.9 - 14.6 % Final   04/08/2022 14.5 11.7 - 15.4 % Final   01/04/2022 14.1 11.7 - 15.4 % Final     Platelets   Date Value Ref Range Status   07/12/2022 144 (L) 150 - 450 K/uL Final   04/08/2022 140 (L) 150 - 450 x10E3/uL Final   01/04/2022 144 (L) 150 - 450 x10E3/uL Final     Vitamin B-12   Date Value Ref Range Status   09/09/2021 807 232 - 1245 pg/mL Final     Folate   Date Value Ref Range Status   09/09/2021 >20.0 >3.0 ng/mL Final     Comment:     A serum folate concentration of less than 3.1 ng/mL is  considered to represent clinical deficiency. 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 The current state of this condition is no significant medication side effects noted and poorly controlled on Viibryd 20 mg daily +/- Clonazepam 0.5-1 mg daily prn - taking as prescribed. , adverse effects: hyperhydrosis. She inquires about possible switching to Lexapro. Previous medications tried include Lexapro 10/2014-5/2015 with complaints of trembling at higher doses and lack of efficacy on lower doses, Paxil 5/2015-6/2025 which also caused hand trembling/shaking, &  Brintellix 6/2015-/2105 (now Trintellix) which caused nausea & diarrhea. Patient is here for follow-up of GERD. The current state of this condition is control uncertain and no significant medication side effects noted on Nexium 40 mg q AM + Famotidine 40 mg q hs + additional 40 mg at lunch if needed - taking as prescribed.       Review of Systems Constitutional:  Positive for fatigue. Negative for unexpected weight change. Respiratory:  Negative for shortness of breath. Cardiovascular:  Negative for chest pain and palpitations. Gastrointestinal:  Positive for diarrhea. Negative for constipation. Endocrine: Positive for heat intolerance. Negative for cold intolerance. Negative for hair loss. Musculoskeletal:  Negative for myalgias.        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    Followed by Dr Marcel Fitzgerald cell carcinoma     Multiple    Depression     Environmental allergies     Seasonal    Epiploic appendagitis Dec 2014    Per CT Scan    GERD (gastroesophageal reflux disease)     Hiatal hernia 01/26/2017    Per Endoscopy    Hypothyroidism (acquired)     Insomnia     Internal hemorrhoids 01/26/2017    Per Colonoscopy    Melanoma ([facility] [address] 10/2018    R Lower Leg    Menopause     Mild coronary artery disease 02/25/2019    Per Cath    Mixed hyperlipidemia     Neurogenic bladder     Osteopenia 2012    managed with Prolia- last injection /2017    PONV (postoperative nausea and vomiting)     Psoriasis     Situational anxiety     Thrombocytopenia ([facility] [address] 09/2020    Vitamin D deficiency          Past Surgical History:   Procedure Laterality Date    CARDIAC CATHETERIZATION  02/22/2019    CATARACT REMOVAL Bilateral 1/2022, 2/2022    COLONOSCOPY  4/2005 & 1/26/17    Tortuous Colon & Internal Hemorrhoids    MALIGNANT SKIN LESION EXCISION  10/2010    Hx of Basal Cell Carcinoma - L Chest & Nose    MALIGNANT SKIN LESION EXCISION  10/31/2018    R Lower Leg    ORTHOPEDIC SURGERY Right 8/27/15    Trigger Finger Release    ORTHOPEDIC SURGERY Right 9/8/15    Interphalangeal Resection Arthroplasty Foot    ORTHOPEDIC SURGERY Left 02/16/2017    Achilles Reconstruction    GILBERTO AND BSO (CERVIX REMOVED)  1994    UPPER GASTROINTESTINAL ENDOSCOPY  01/26/2017    Hiatal Hernia         Social History     Socioeconomic History    Marital status:      Spouse name: None    Number of children: None    Years of education: None    Highest education level: None   Tobacco Use    Smoking status: Never    Smokeless tobacco: Never   Substance and Sexual Activity    Alcohol use: Yes     Alcohol/week: 2.0 standard drinks    Drug use: No     Social Determinants of Health     Physical Activity: Insufficiently Active    Days of Exercise per Week: 3 days    Minutes of Exercise per Session: 40 min         Family History   Problem Relation Age of Onset    Stroke Mother     Hypertension Father     Elevated Lipids Mother     Heart Failure Father     Elevated Lipids Father     Diabetes Sister 54        Surgically Induced, Diabetic Coma    Breast Cancer Neg Hx     Heart Disease Father         CAD         Current Outpatient Medications   Medication Sig Dispense Refill    nitroGLYCERIN (NITROSTAT) 0.4 MG SL tablet Place 1 SL under the tongue q 5 min prn cp, max 3 SL in a 15-min time period. Call 911 if no relief after the 1st SL. 25 tablet 3    docusate (COLACE, DULCOLAX) 100 MG CAPS Take 100 mg by mouth in the morning. esomeprazole (NEXIUM) 40 MG delayed release capsule TAKE 1 CAPSULE BY MOUTH EVERY DAY      latanoprost (XALATAN) 0.005 % ophthalmic solution INSTILL 1 DROP IN BOTH EYES EVERY EVENING      clonazePAM (KLONOPIN) 1 MG tablet Take 1/2-1 tablet po q AM as needed for anxiety & 1.5 tabets po q hs for sleep.  75 tablet 0    aspirin 81 MG EC tablet Take 81 mg by mouth      Biotin 2.5 MG CAPS Take 1 tablet by mouth       Cetirizine HCl 10 MG CAPS Take 1 capsule by mouth      clobetasol (TEMOVATE) 0.05 % ointment APPLY TO AFFECTED AREAS TWICE DAILY WHEN FLARES      denosumab (PROLIA) 60 MG/ML SOSY SC injection Inject 60 mg into the skin every 6 months       famotidine (PEPCID) 40 MG tablet Take 40 mg by mouth as needed       fluticasone (FLONASE) 50 MCG/ACT nasal spray 2 sprays by Nasal route daily      levothyroxine (SYNTHROID) 50 MCG tablet 50 mcg Daily Brand medically necessary. polyethylene glycol (GLYCOLAX) 17 GM/SCOOP powder Take 17 g by mouth daily      vilazodone HCl (VIIBRYD) 20 MG TABS Take 20 mg by mouth daily (with breakfast)       No current facility-administered medications for this visit. Patient's complete Health Risk Assessment and screening values have been reviewed and are found in Flowsheets. The following problems were reviewed today and where indicated follow up appointments were made and/or referrals ordered.     Positive Risk Factor Screenings with Interventions:      Depression:  PHQ-2 Score: 2  PHQ-9 Total Score: 7    Severity:1-4 = minimal depression, 5-9 = mild depression, 10-14 = moderate depression, 15-19 = moderately severe depression, 20-27 = severe depression  Depression Interventions:  LPN INTERVENTION GUIDE: SCORE 5-14 = MODERATE DEPRESSION: FOLLOW UP IN [address] [place] [address] and ACP:  General  In general, how would you say your health is?: Good  In the past 7 days, have you experienced any of the following: New or Increased Pain, New or Increased Fatigue, Loneliness, Social Isolation, Stress or Anger?: (!) Yes  Select all that apply: (!) Stress  Do you get the social and emotional support that you need?: Yes  Do you have a Living Will?: Yes    Advance Directives       Power of  Living Will ACP-Advance Directive ACP-Power of     Not on File Not on File Not on File Not on File          General Health Risk Interventions:  Stress: relaxation techniques discussed, continuing with counseling, medication adjustments being considered  No Living Will: ACP documents already completed- patient asked to provide copy to the office              Objective   Vitals:    07/18/22 1350   BP: 110/70   Site: Left Upper Arm   Position: Sitting   Cuff Size: Large Adult   Pulse: 72   Temp: 97.9 °F (36.6 °C)   TempSrc: Temporal   SpO2: 99%   Weight: 130 lb (59 kg)   Height: 5' 4\" (1.626 m)      Body mass index is Propranolol Other (See Comments)     \"Didn't sleep for 2 days and  I saw green splotches on 2 different days \"  \"Didn't sleep for 2 days and  I saw green splotches on 2 different days \"    Sulfa Antibiotics Other (See Comments)     As a child/ unknown reactions  As a child/ unknown reactions     Prior to Visit Medications    Medication Sig Taking? Authorizing Provider   nitroGLYCERIN (NITROSTAT) 0.4 MG SL tablet Place 1 SL under the tongue q 5 min prn cp, max 3 SL in a 15-min time period. Call 911 if no relief after the 1st SL. Yes Cristina Dobbs MD   docusate (COLACE, DULCOLAX) 100 MG CAPS Take 100 mg by mouth in the morning. Yes Historical Provider, MD   esomeprazole (NEXIUM) 40 MG delayed release capsule TAKE 1 CAPSULE BY MOUTH EVERY DAY Yes Historical Provider, MD   latanoprost (XALATAN) 0.005 % ophthalmic solution INSTILL 1 DROP IN BOTH EYES EVERY EVENING Yes Historical Provider, MD   clonazePAM (KLONOPIN) 1 MG tablet Take 1/2-1 tablet po q AM as needed for anxiety & 1.5 tabets po q hs for sleep. Yes Bret Hart PA-C   aspirin 81 MG EC tablet Take 81 mg by mouth Yes Ar Automatic Reconciliation   Biotin 2.5 MG CAPS Take 1 tablet by mouth  Yes Ar Automatic Reconciliation   Cetirizine HCl 10 MG CAPS Take 1 capsule by mouth Yes Ar Automatic Reconciliation   clobetasol (TEMOVATE) 0.05 % ointment APPLY TO AFFECTED AREAS TWICE DAILY WHEN FLARES Yes Ar Automatic Reconciliation   denosumab (PROLIA) 60 MG/ML SOSY SC injection Inject 60 mg into the skin every 6 months  Yes Ar Automatic Reconciliation   famotidine (PEPCID) 40 MG tablet Take 40 mg by mouth as needed  Yes Ar Automatic Reconciliation   fluticasone (FLONASE) 50 MCG/ACT nasal spray 2 sprays by Nasal route daily Yes Ar Automatic Reconciliation   levothyroxine (SYNTHROID) 50 MCG tablet 50 mcg Daily Brand medically necessary.  Yes Ar Automatic Reconciliation   polyethylene glycol (GLYCOLAX) 17 GM/SCOOP powder Take 17 g by mouth daily Yes Ar Automatic Reconciliation   vilazodone HCl (VIIBRYD) 20 MG TABS Take 20 mg by mouth daily (with breakfast) Yes Ar Automatic Reconciliation       CareTeam (Including outside providers/suppliers regularly involved in providing care):   Patient Care Team:  Jacquie Elmore PA-C as PCP - General (Physician Assistant)  Jacquie Elmore PA-C as PCP - [facility] Empaneled Provider  Jimmy Mora MD as Physician  Shellie Box MD as Physician  Caleb Christy MD as Consulting Physician (Cardiology)  Deisy Zamorano MD (Ophthalmology)     Reviewed and updated this visit:  Tobacco  Allergies  Med Hx  Surg Hx  Soc Hx  Fam Hx        Greater than 50% of this 60 minute visit separate from [facility] components was spent counseling Sona Adams about her lab results, status of chronic conditions, recommended medication changes and lifestyle modifications to gain or maintain good control of her overall health. Extensive time spent reviewing her old chart to review previous depression medications used, dates of treatment, and outcomes to contribute to clinical decision making regarding appropriate next steps.

## 2022-07-22 RX ORDER — VILAZODONE HYDROCHLORIDE 20 MG/1
TABLET ORAL
Qty: 90 TABLET | OUTPATIENT
Start: 2022-07-22

## 2022-07-26 ENCOUNTER — TELEPHONE (OUTPATIENT)
Dept: INTERNAL MEDICINE CLINIC | Facility: CLINIC | Age: 72
End: 2022-07-26

## 2022-07-26 NOTE — TELEPHONE ENCOUNTER
Pt is unable to come in for an appt on Thurs due to a 2 day colon prep for her colonoscopy on Friday.

## 2022-07-28 RX ORDER — CLONAZEPAM 1 MG/1
TABLET ORAL
Qty: 75 TABLET | Refills: 2 | Status: SHIPPED | OUTPATIENT
Start: 2022-07-28 | End: 2022-09-22 | Stop reason: SDUPTHER

## 2022-07-29 ENCOUNTER — HOSPITAL ENCOUNTER (OUTPATIENT)
Dept: LAB | Age: 72
Discharge: HOME | End: 2022-08-01

## 2022-07-29 PROCEDURE — 88312 SPECIAL STAINS GROUP 1: CPT

## 2022-07-29 PROCEDURE — 88305 TISSUE EXAM BY PATHOLOGIST: CPT

## 2022-08-01 ENCOUNTER — TELEPHONE (OUTPATIENT)
Dept: INTERNAL MEDICINE CLINIC | Facility: CLINIC | Age: 72
End: 2022-08-01

## 2022-08-01 NOTE — TELEPHONE ENCOUNTER
Pt is unable to come in this week for appt with Юлия Abreu that was a work in. Is there another time in the next week or 2 that you can fit her in? She is going out of town the end of the month. Please call pt back.

## 2022-08-01 NOTE — TELEPHONE ENCOUNTER
----- Message from [address] sent at 8/1/2022  3:57 PM EDT -----  Subject: Appointment Request    Reason for Call: Established Patient Appointment needed: Routine Existing   Condition Follow Up    QUESTIONS    Reason for appointment request? No appointments available during search     Additional Information for Provider? Patient would like Iam Cervantes to call her   to reschedule her appt. She had an emergency come up and needs reschedule   with [facility].  Please call pt if this appt needs to be taken care of asap.  ---------------------------------------------------------------------------  --------------  [id number] [employer]  [phone]; OK to leave message on voicemail  ---------------------------------------------------------------------------  --------------  SCRIPT ANSWERS  COVID Screen: Jessica Barragan

## 2022-08-08 ENCOUNTER — OFFICE VISIT (OUTPATIENT)
Dept: INTERNAL MEDICINE CLINIC | Facility: CLINIC | Age: 72
End: 2022-08-08
Payer: MEDICARE

## 2022-08-08 VITALS
TEMPERATURE: 97.3 F | SYSTOLIC BLOOD PRESSURE: 130 MMHG | OXYGEN SATURATION: 99 % | HEIGHT: 64 IN | BODY MASS INDEX: 22.31 KG/M2 | DIASTOLIC BLOOD PRESSURE: 70 MMHG | HEART RATE: 63 BPM

## 2022-08-08 DIAGNOSIS — F33.1 MODERATE RECURRENT MAJOR DEPRESSION (HCC): Primary | ICD-10-CM

## 2022-08-08 DIAGNOSIS — F43.9 SITUATIONAL STRESS: ICD-10-CM

## 2022-08-08 DIAGNOSIS — K21.9 GASTROESOPHAGEAL REFLUX DISEASE WITHOUT ESOPHAGITIS: ICD-10-CM

## 2022-08-08 DIAGNOSIS — Z23 ENCOUNTER FOR IMMUNIZATION: ICD-10-CM

## 2022-08-08 PROCEDURE — G0009 ADMIN PNEUMOCOCCAL VACCINE: HCPCS | Performed by: PHYSICIAN ASSISTANT

## 2022-08-08 PROCEDURE — 1090F PRES/ABSN URINE INCON ASSESS: CPT | Performed by: PHYSICIAN ASSISTANT

## 2022-08-08 PROCEDURE — G8399 PT W/DXA RESULTS DOCUMENT: HCPCS | Performed by: PHYSICIAN ASSISTANT

## 2022-08-08 PROCEDURE — G8420 CALC BMI NORM PARAMETERS: HCPCS | Performed by: PHYSICIAN ASSISTANT

## 2022-08-08 PROCEDURE — 3017F COLORECTAL CA SCREEN DOC REV: CPT | Performed by: PHYSICIAN ASSISTANT

## 2022-08-08 PROCEDURE — 1036F TOBACCO NON-USER: CPT | Performed by: PHYSICIAN ASSISTANT

## 2022-08-08 PROCEDURE — 99214 OFFICE O/P EST MOD 30 MIN: CPT | Performed by: PHYSICIAN ASSISTANT

## 2022-08-08 PROCEDURE — G8427 DOCREV CUR MEDS BY ELIG CLIN: HCPCS | Performed by: PHYSICIAN ASSISTANT

## 2022-08-08 PROCEDURE — 90732 PPSV23 VACC 2 YRS+ SUBQ/IM: CPT | Performed by: PHYSICIAN ASSISTANT

## 2022-08-08 PROCEDURE — 1123F ACP DISCUSS/DSCN MKR DOCD: CPT | Performed by: PHYSICIAN ASSISTANT

## 2022-08-08 RX ORDER — VILAZODONE HYDROCHLORIDE 20 MG/1
20 TABLET ORAL
Qty: 90 TABLET | Refills: 3 | Status: SHIPPED | OUTPATIENT
Start: 2022-08-08 | End: 2022-08-16 | Stop reason: SDUPTHER

## 2022-08-08 ASSESSMENT — PATIENT HEALTH QUESTIONNAIRE - PHQ9
SUM OF ALL RESPONSES TO PHQ QUESTIONS 1-9: 0
2. FEELING DOWN, DEPRESSED OR HOPELESS: 0
1. LITTLE INTEREST OR PLEASURE IN DOING THINGS: 0
SUM OF ALL RESPONSES TO PHQ QUESTIONS 1-9: 0
SUM OF ALL RESPONSES TO PHQ9 QUESTIONS 1 & 2: 0

## 2022-08-08 ASSESSMENT — ENCOUNTER SYMPTOMS: DIARRHEA: 1

## 2022-08-08 NOTE — PATIENT INSTRUCTIONS
Suggest putting Nexium 30 minutes before lunch to avoid drug interaction within 4 hours of Synthroid  Clarified that Famotidine was meant to be kept for bedtime dosing and additional dose in the AM if/when needed  Thoroughly reviewed GeneSight results but informed that the 3 other alternative options in the \"good\" category also listed the possibility of sweating as equal to or higher than Viibryd - offered to change her treatment regimen but she declined at this time  Continue chronic medications as prescribed      Patient Education        pneumococcal polysaccharides vaccine (PPSV), 23-valent  Pronunciation: SHIKHA Novak, 23-VAY lent  Brand: Pneumovax 23  What is the most important information I should know about this vaccine? You should not receive a booster vaccine if you had a life-threatening allergicreaction after the first shot. What is pneumococcal polysaccharides vaccine (PPSV)? Pneumococcal disease is a serious infection caused by a bacteria that can infect the sinuses, inner ear, lungs, blood, and brain. These conditions can befatal.  Pneumococcal polysaccharides vaccine (PPSV) is used to help prevent disease caused by pneumococcal bacteria. This vaccine contains 23 different types ofpneumococcal bacteria. PPSV is for use in adults 48 years and older, and in people at least 3years old who have an increased risk of developing pneumococcal disease due tocertain medical conditions. The Centers for Disease Control and Prevention (CDC) recommends this vaccine in adults 72 years and older even if they had a pneumococcal vaccine before theage of 72. This vaccine helps your body develop immunity to the disease, but will nottreat an active infection you already have. Like any vaccine, pneumococcal polysaccharides vaccine may not provideprotection from disease in every person. What should I discuss with my healthcare provider before receiving this vaccine?   You should not receive this vaccine if you ever had a severe allergic reactionto a pneumococcal vaccine. Tell the vaccination provider if you or the child has:  heart problems;  a breathing disorder;  a weak immune system (caused by disease or by using certain medicine); or  if you are receiving radiation or chemotherapy. You can still receive a vaccine if you have a minor cold. In the case of a more severe illness with a fever or any type of infection, wait until you get betterbefore receiving this vaccine. Tell the vaccination provider if you are pregnant or breastfeeding. How is this vaccine given? This vaccine is given as an injection (shot) into a muscle or under the skin. PPSV is usually given as 1 shot. You may need another shot if you are at highrisk of infection with pneumococcal bacteria. Keep taking any antibiotic your doctor has prescribed to help protect youagainst pneumococcal disease. What happens if I miss a dose? Since PPSV is usually given only one time, you will most likely not be on adosing schedule. What happens if I overdose? An overdose of this vaccine is unlikely to occur. What should I avoid after receiving this vaccine? Follow your doctor's instructions about any restrictions on food, beverages, oractivity. What are the possible side effects of this vaccine? Get emergency medical help if you have signs of an allergic reaction: hives; difficult breathing; swelling of your face, lips, tongue, or throat. You should not receive a booster vaccine if you had a life threatening allergicreaction after the first shot. Keep track of all side effects you have. If you need a booster dose, you will need to tell the vaccination provider if the previous shot caused any sideeffects. Becoming infected with pneumococcal disease is much more dangerous to your health than receiving this vaccine. However, like any medicine, this vaccinecan cause side effects but the risk of serious side effects is low.   Call your doctor at once if you have:  wheezing, trouble breathing;  chest pain;  severe stomach pain, severe vomiting or diarrhea;  tremors, muscle stiffness; or  painful or difficult urination. Common side effects may include:  pain, warmth, swelling, redness, or a hard lump where a shot was given;  muscle pain;  headache; or  feeling weak or tired. This is not a complete list of side effects and others may occur. Call your doctor for medical advice about side effects. You may report vaccine sideeffects to the [address] [place] [address] [place] at [phone]. What other drugs will affect pneumococcal polysaccharides vaccine (PPSV)? Before receiving this vaccine, tell your vaccination provider about all othervaccines you have recently received, especially a zoster (shingles) vaccine. Also tell the vaccination provider if you have recently received drugs ortreatments that can weaken the immune system, including:  steroid medicine;  medications to treat psoriasis, rheumatoid arthritis, or other autoimmune disorders; or  medicines to treat or prevent organ transplant rejection. This list is not complete. Other drugs may affect PPSV, including prescription and over-the-counter medicines, vitamins, and herbal products. Not all possibledrug interactions are listed here. Where can I get more information? Your vaccination provider, pharmacist, or doctor can provide more information about this vaccine. Additional information is available from your local [facility] or the Centers for Disease Control and Prevention. Remember, keep this and all other medicines out of the reach of children, never share your medicines with others, and use this medication only for the indication prescribed. Every effort has been made to ensure that the information provided by [address] is accurate, up-to-date, and complete, but no guarantee is made to that effect.  Drug information contained herein may be time sensitive. [facility] information has been compiled for use by healthcare practitioners and consumers in the [place] and therefore [employer] does not warrant that uses outside of the [place] are appropriate, unless specifically indicated otherwise. [facility]'s drug information does not endorse drugs, diagnose patients or recommend therapy. [facility] drug information is an informational resource designed to assist licensed healthcare practitioners in caring for their patients and/or to serve consumers viewing this service as a supplement to, and not a substitute for, the expertise, skill, knowledge and judgment of healthcare practitioners. The absence of a warning for a given drug or drug combination in no way should be construed to indicate that the drug or drug combination is safe, effective or appropriate for any given patient. [facility] does not assume any responsibility for any aspect of healthcare administered with the aid of information [facility] provides. The information contained herein is not intended to cover all possible uses, directions, precautions, warnings, drug interactions, allergic reactions, or adverse effects. If you have questions about the drugs you are taking, check with yourdoctor, nurse or pharmacist.  Copyright 2456-5424 [address]. Version: 6.01. Revision date:10/12/2021. Care instructions adapted under license by [facility] ([facility]). If you have questions about a medical condition or this instruction, always ask your healthcare professional. [place] [address] any warranty or liability for your use of this information.

## 2022-08-08 NOTE — PROGRESS NOTES
Maurice Harris (:  1950) is a 74 y.o. female,Established patient, here for evaluation of the following chief complaint(s):  Results (Gene site results)         ASSESSMENT/PLAN:  1. Moderate recurrent major depression (HCC)  -     vilazodone HCl (VIIBRYD) 20 MG TABS; Take 1 tablet by mouth daily (with breakfast), Disp-90 tablet, R-3Normal  2. Encounter for immunization  -     Pneumococcal, PPSV23, PNEUMOVAX 21, (age 2 yrs+), SC/IM  3. Gastroesophageal reflux disease without esophagitis  4. Situational stress      Patient Instructions   Suggest putting Nexium 30 minutes before lunch to avoid drug interaction within 4 hours of Synthroid  Clarified that Famotidine was meant to be kept for bedtime dosing and additional dose in the AM if/when needed  Thoroughly reviewed GeneSight results but informed that the 3 other alternative options in the \"good\" category also listed the possibility of sweating as equal to or higher than Viibryd - offered to change her treatment regimen but she declined at this time  Continue chronic medications as prescribed      Patient Education        pneumococcal polysaccharides vaccine (PPSV), 23-valent  Pronunciation: SHIKHA Novak, 23-NOBLE salgado  Brand: Pneumovax 23  What is the most important information I should know about this vaccine? You should not receive a booster vaccine if you had a life-threatening allergicreaction after the first shot. What is pneumococcal polysaccharides vaccine (PPSV)? Pneumococcal disease is a serious infection caused by a bacteria that can infect the sinuses, inner ear, lungs, blood, and brain. These conditions can befatal.  Pneumococcal polysaccharides vaccine (PPSV) is used to help prevent disease caused by pneumococcal bacteria. This vaccine contains 23 different types ofpneumococcal bacteria.   PPSV is for use in adults 48 years and older, and in people at least 3years old who have an increased risk of developing pneumococcal disease due tocertain medical conditions. The Centers for Disease Control and Prevention (CDC) recommends this vaccine in adults 72 years and older even if they had a pneumococcal vaccine before theage of 72. This vaccine helps your body develop immunity to the disease, but will nottreat an active infection you already have. Like any vaccine, pneumococcal polysaccharides vaccine may not provideprotection from disease in every person. What should I discuss with my healthcare provider before receiving this vaccine? You should not receive this vaccine if you ever had a severe allergic reactionto a pneumococcal vaccine. Tell the vaccination provider if you or the child has:  heart problems;  a breathing disorder;  a weak immune system (caused by disease or by using certain medicine); or  if you are receiving radiation or chemotherapy. You can still receive a vaccine if you have a minor cold. In the case of a more severe illness with a fever or any type of infection, wait until you get betterbefore receiving this vaccine. Tell the vaccination provider if you are pregnant or breastfeeding. How is this vaccine given? This vaccine is given as an injection (shot) into a muscle or under the skin. PPSV is usually given as 1 shot. You may need another shot if you are at highrisk of infection with pneumococcal bacteria. Keep taking any antibiotic your doctor has prescribed to help protect youagainst pneumococcal disease. What happens if I miss a dose? Since PPSV is usually given only one time, you will most likely not be on adosing schedule. What happens if I overdose? An overdose of this vaccine is unlikely to occur. What should I avoid after receiving this vaccine? Follow your doctor's instructions about any restrictions on food, beverages, oractivity. What are the possible side effects of this vaccine?   Get emergency medical help if you have signs of an allergic reaction: hives; difficult breathing; swelling of your face, lips, tongue, or throat. You should not receive a booster vaccine if you had a life threatening allergicreaction after the first shot. Keep track of all side effects you have. If you need a booster dose, you will need to tell the vaccination provider if the previous shot caused any sideeffects. Becoming infected with pneumococcal disease is much more dangerous to your health than receiving this vaccine. However, like any medicine, this vaccinecan cause side effects but the risk of serious side effects is low. Call your doctor at once if you have:  wheezing, trouble breathing;  chest pain;  severe stomach pain, severe vomiting or diarrhea;  tremors, muscle stiffness; or  painful or difficult urination. Common side effects may include:  pain, warmth, swelling, redness, or a hard lump where a shot was given;  muscle pain;  headache; or  feeling weak or tired. This is not a complete list of side effects and others may occur. Call your doctor for medical advice about side effects. You may report vaccine sideeffects to the [address] [place] [address] [place] at [phone]. What other drugs will affect pneumococcal polysaccharides vaccine (PPSV)? Before receiving this vaccine, tell your vaccination provider about all othervaccines you have recently received, especially a zoster (shingles) vaccine. Also tell the vaccination provider if you have recently received drugs ortreatments that can weaken the immune system, including:  steroid medicine;  medications to treat psoriasis, rheumatoid arthritis, or other autoimmune disorders; or  medicines to treat or prevent organ transplant rejection. This list is not complete. Other drugs may affect PPSV, including prescription and over-the-counter medicines, vitamins, and herbal products. Not all possibledrug interactions are listed here. Where can I get more information?   Your vaccination provider, pharmacist, or doctor can provide more information about this vaccine. Additional information is available from your local [facility] or the Centers for Disease Control and Prevention. Remember, keep this and all other medicines out of the reach of children, never share your medicines with others, and use this medication only for the indication prescribed. Every effort has been made to ensure that the information provided by Linda [address] is accurate, up-to-date, and complete, but no guarantee is made to that effect. Drug information contained herein may be time sensitive. [facility] information has been compiled for use by healthcare practitioners and consumers in the [place] and therefore [facility] does not warrant that uses outside of the [place] are appropriate, unless specifically indicated otherwise. [facility]'s drug information does not endorse drugs, diagnose patients or recommend therapy. [facility]'s drug information is an informational resource designed to assist licensed healthcare practitioners in caring for their patients and/or to serve consumers viewing this service as a supplement to, and not a substitute for, the expertise, skill, knowledge and judgment of healthcare practitioners. The absence of a warning for a given drug or drug combination in no way should be construed to indicate that the drug or drug combination is safe, effective or appropriate for any given patient. [facility] does not assume any responsibility for any aspect of healthcare administered with the aid of information [facility] provides. The information contained herein is not intended to cover all possible uses, directions, precautions, warnings, drug interactions, allergic reactions, or adverse effects. If you have questions about the drugs you are taking, check with yourdoctor, nurse or pharmacist.  Copyright 5707-0144 [address]. Version: 6.01. Revision date:10/12/2021. Care instructions adapted under license by [facility] ([facility]). If you have questions about a medical condition or this instruction, always ask your healthcare professional. [address] any warranty or liability for your use of this information. Return in about 15 weeks (around 11/21/2022) for previously scheduled appt. Subjective   SUBJECTIVE/OBJECTIVE:  HPI  Patient is here for follow-up of moderate recurrent depression. The current state of this condition is no significant medication side effects noted and reasonably well controlled on Viibryd 20 mg daily - taking as prescribed, adverse effects: sweating. She had expressed interest in trying Lexapro not realizing that she had been on it years ago and experienced trembling/shaking. [employer] Psychotropic Pharmacogenomic Test was completed with results showing limited medications that show good gene-drug potential: Pristiq, Fetzima, Zoloft & Viibryd. She is continuing to see her counselor regularly and has upcoming travel plans to [place] so she is feeling reasonably well right now and does not desire any change of medication at this time. Patient is here for follow-up of GERD. The current state of this condition is needs improvement on Famotidine 40 mg bid - taking as prescribed. Patient was re-prescribed Nexium by GI which she hasn't yet started due to delays at pharmacy level. Review of GI records recommends Nexium once daily & Famotidine nightly + additional dose during the day if needed. Recent work-up included colonoscopy which found a hyperplastic polyp & diverticulosis. Review of Systems   Constitutional:  Positive for diaphoresis. Gastrointestinal:  Positive for diarrhea. Psychiatric/Behavioral:  Positive for dysphoric mood. Negative for sleep disturbance. The patient is nervous/anxious.          /70 (Site: Left Upper Arm, Position: Sitting, Cuff Size: Small Adult)   Pulse 63   Temp 97.3 °F (36.3 °C) (Temporal)   Ht 5' 4\" (1.626 m)   SpO2 99% BMI 22.31 kg/m²       Objective   Physical Exam  Constitutional:       Appearance: Normal appearance. HENT:      Head: Normocephalic and atraumatic. Eyes:      Conjunctiva/sclera: Conjunctivae normal.      Pupils: Pupils are equal, round, and reactive to light. Neck:      Vascular: No carotid bruit. Cardiovascular:      Rate and Rhythm: Normal rate and regular rhythm. Heart sounds: Normal heart sounds. Pulmonary:      Effort: Pulmonary effort is normal.      Breath sounds: Normal breath sounds. Musculoskeletal:         General: Normal range of motion. Cervical back: Normal range of motion. Skin:     General: Skin is warm and dry. Neurological:      Mental Status: She is alert and oriented to person, place, and time. Psychiatric:         Mood and Affect: Mood normal.         Behavior: Behavior normal.         Thought Content: Thought content normal.         Judgment: Judgment normal.                An electronic signature was used to authenticate this note.     --Olegario Zhao PA-C

## 2022-08-10 ENCOUNTER — TELEPHONE (OUTPATIENT)
Dept: INTERNAL MEDICINE CLINIC | Facility: CLINIC | Age: 72
End: 2022-08-10

## 2022-08-10 NOTE — TELEPHONE ENCOUNTER
Pt called stating that she received a PPSV-23 at her visit on Monday and she woke up yesterday with some redness noted below the injection site and under her arm as well as palpating a possible small know under her arm. Pt states she has also had some pain around the injection site. Pt notified to take OTC pain reliever (Tylenol, ibuprofen. ..) and intermittently use a cold compress at the injection site. Pt also notified to inform me if the pain and redness worsens instead of getting better. Pt agreed and verbalized understanding.

## 2022-08-15 DIAGNOSIS — F33.1 MODERATE RECURRENT MAJOR DEPRESSION (HCC): ICD-10-CM

## 2022-08-15 DIAGNOSIS — E03.9 ACQUIRED HYPOTHYROIDISM: Primary | ICD-10-CM

## 2022-08-15 RX ORDER — VILAZODONE HYDROCHLORIDE 20 MG/1
20 TABLET ORAL
Qty: 90 TABLET | Refills: 3 | Status: CANCELLED | OUTPATIENT
Start: 2022-08-15

## 2022-08-16 RX ORDER — LEVOTHYROXINE SODIUM 50 MCG
50 TABLET ORAL
Qty: 90 TABLET | Refills: 3 | Status: SHIPPED | OUTPATIENT
Start: 2022-08-16

## 2022-08-16 RX ORDER — VILAZODONE HYDROCHLORIDE 20 MG/1
20 TABLET ORAL DAILY
Qty: 90 TABLET | Refills: 3 | Status: SHIPPED | OUTPATIENT
Start: 2022-08-16

## 2022-08-17 NOTE — TELEPHONE ENCOUNTER
Synthroid sent. I sent branded Viibryd but it will likely not be covered by insurance so the cost is going to be significantly higher.   The only way we can get insurance to cover a branded med after it goes generic is to prove complications or side effects from generic trial.

## 2022-09-09 ENCOUNTER — TELEPHONE (OUTPATIENT)
Dept: INTERNAL MEDICINE CLINIC | Facility: CLINIC | Age: 72
End: 2022-09-09

## 2022-09-09 ENCOUNTER — TELEMEDICINE (OUTPATIENT)
Dept: INTERNAL MEDICINE CLINIC | Facility: CLINIC | Age: 72
End: 2022-09-09
Payer: MEDICARE

## 2022-09-09 DIAGNOSIS — U07.1 LAB TEST POSITIVE FOR DETECTION OF COVID-19 VIRUS: Primary | ICD-10-CM

## 2022-09-09 DIAGNOSIS — R05.9 COUGH: ICD-10-CM

## 2022-09-09 DIAGNOSIS — H92.03 OTALGIA OF BOTH EARS: ICD-10-CM

## 2022-09-09 DIAGNOSIS — J00 ACUTE NASOPHARYNGITIS: Primary | ICD-10-CM

## 2022-09-09 PROCEDURE — 1036F TOBACCO NON-USER: CPT | Performed by: INTERNAL MEDICINE

## 2022-09-09 PROCEDURE — G8399 PT W/DXA RESULTS DOCUMENT: HCPCS | Performed by: INTERNAL MEDICINE

## 2022-09-09 PROCEDURE — G8427 DOCREV CUR MEDS BY ELIG CLIN: HCPCS | Performed by: INTERNAL MEDICINE

## 2022-09-09 PROCEDURE — 1123F ACP DISCUSS/DSCN MKR DOCD: CPT | Performed by: INTERNAL MEDICINE

## 2022-09-09 PROCEDURE — 1090F PRES/ABSN URINE INCON ASSESS: CPT | Performed by: INTERNAL MEDICINE

## 2022-09-09 PROCEDURE — 99213 OFFICE O/P EST LOW 20 MIN: CPT | Performed by: INTERNAL MEDICINE

## 2022-09-09 PROCEDURE — 3017F COLORECTAL CA SCREEN DOC REV: CPT | Performed by: INTERNAL MEDICINE

## 2022-09-09 PROCEDURE — G8420 CALC BMI NORM PARAMETERS: HCPCS | Performed by: INTERNAL MEDICINE

## 2022-09-09 RX ORDER — AZITHROMYCIN 250 MG/1
250 TABLET, FILM COATED ORAL SEE ADMIN INSTRUCTIONS
Qty: 6 TABLET | Refills: 0 | Status: SHIPPED | OUTPATIENT
Start: 2022-09-09 | End: 2022-09-14

## 2022-09-09 RX ORDER — BENZONATATE 100 MG/1
100 CAPSULE ORAL 3 TIMES DAILY PRN
Qty: 30 CAPSULE | Refills: 0 | Status: SHIPPED | OUTPATIENT
Start: 2022-09-09 | End: 2022-09-16

## 2022-09-09 RX ORDER — METHYLPREDNISOLONE 4 MG/1
TABLET ORAL
Qty: 1 KIT | Refills: 0 | Status: SHIPPED | OUTPATIENT
Start: 2022-09-09 | End: 2022-09-15

## 2022-09-09 ASSESSMENT — ENCOUNTER SYMPTOMS
COUGH: 1
RHINORRHEA: 1
WHEEZING: 0
SHORTNESS OF BREATH: 0

## 2022-09-09 NOTE — TELEPHONE ENCOUNTER
Medrol dosepack and Tessalon sent to [facility] by Dr. Lashon Aguirre on 9/9/22. Pt notified and verbalized understanding.

## 2022-09-09 NOTE — PROGRESS NOTES
[facility]      2022    Patient Name: Lizzie Camejo  :  1950    Subjective:    Chief Complaint:  Chief Complaint   Patient presents with    Follow-up         HPI I was at home while conducting this encounter. Consent:  She and/or her healthcare decision maker is aware that this patient-initiated Telehealth encounter is a billable service, with coverage as determined by her insurance carrier. She is aware that she may receive a bill and has provided verbal consent to proceed: Yes    This virtual visit was conducted via Tango. Pursuant to the emergency declaration under the [facility], [facility] waiver authority and the [employer] Act, this Virtual  Visit was conducted to reduce the patient's risk of exposure to COVID-19 and provide continuity of care for an established patient. Services were provided through a video synchronous discussion virtually to substitute for in-person clinic visit. Due to this being a TeleHealth evaluation, many elements of the physical examination are unable to be assessed. Total Time: minutes: 5-10 minutes.        She and  recently got back from [address] [place] [address]; they both tested positive for Covid 19 on 2022; c/o head and chest congestion, ear fullness; she denies SOB, wheezing; c/o cough; she has not been monitoring pulse oximeter; appetite is good; she has been out of nasal spray; she no longer has fever, chills, in over a week; she has had some diarrhea; she had minimal relief with Mucinex    Past Medical History:   Diagnosis Date    Acoustic neuroma ([facility] [address]     Followed by Dr Chamberlain Fu cell carcinoma     Multiple    Depression     Environmental allergies     Seasonal    Epiploic appendagitis Dec 2014    Per CT Scan    GERD (gastroesophageal reflux disease)     Hiatal hernia 2017    Per Endoscopy    Hypothyroidism (acquired)     Insomnia Internal hemorrhoids 01/26/2017    Per Colonoscopy    Melanoma ([facility] [address] 10/2018    R Lower Leg    Menopause     Mild coronary artery disease 02/25/2019    Per Cath    Mixed hyperlipidemia     Neurogenic bladder     Osteopenia 2012    managed with Prolia- last injection /2017    PONV (postoperative nausea and vomiting)     Psoriasis     Situational anxiety     Stage 3 chronic kidney disease ([facility] [address] 11/2016    Thrombocytopenia ([facility] [address] 09/2020    Vitamin D deficiency        Past Surgical History:   Procedure Laterality Date    CARDIAC CATHETERIZATION  02/22/2019    CATARACT REMOVAL Bilateral 1/2022, 2/2022    COLONOSCOPY  4/2005 & 1/26/17    Tortuous Colon & Internal Hemorrhoids    COLONOSCOPY  07/29/2022    Hyperplastic Colon Polyp + Diverticulosis - No Further Recommended    MALIGNANT SKIN LESION EXCISION  10/2010    Hx of Basal Cell Carcinoma - L Chest & Nose    MALIGNANT SKIN LESION EXCISION  10/31/2018    R Lower Leg    ORTHOPEDIC SURGERY Right 8/27/15    Trigger Finger Release    ORTHOPEDIC SURGERY Right 9/8/15    Interphalangeal Resection Arthroplasty Foot    ORTHOPEDIC SURGERY Left 02/16/2017    Achilles Reconstruction    GILBERTO AND BSO (CERVIX REMOVED)  1994    UPPER GASTROINTESTINAL ENDOSCOPY  01/26/2017    Hiatal Hernia       Family History   Problem Relation Age of Onset    Stroke Mother     Hypertension Father     Elevated Lipids Mother     Heart Failure Father     Elevated Lipids Father     Diabetes Sister 54        Surgically Induced, Diabetic Coma    Breast Cancer Neg Hx     Heart Disease Father         CAD       Social History     Tobacco Use    Smoking status: Never    Smokeless tobacco: Never   Vaping Use    Vaping Use: Never used   Substance Use Topics    Alcohol use: Yes     Alcohol/week: 2.0 standard drinks    Drug use:  No                 Current Outpatient Medications:     methylPREDNISolone (MEDROL DOSEPACK) 4 MG tablet, Take by mouth., Disp: 1 kit, Rfl: 0    benzonatate (TESSALON) 100 MG capsule, Take 1 capsule by mouth 3 times daily as needed for Cough, Disp: 30 capsule, Rfl: 0    SYNTHROID 50 MCG tablet, Take 1 tablet by mouth every morning (before breakfast) Brand medically necessary. , Disp: 90 tablet, Rfl: 3    VILAZODONE HCL 20 MG Tablet, Take 1 tablet by mouth in the morning., Disp: 90 tablet, Rfl: 3    clonazePAM (KLONOPIN) 1 MG tablet, Take 1/2-1 tablet po q AM as needed for anxiety & 1.5 tabets po q hs for sleep., Disp: 75 tablet, Rfl: 2    nitroGLYCERIN (NITROSTAT) 0.4 MG SL tablet, Place 1 SL under the tongue q 5 min prn cp, max 3 SL in a 15-min time period.  Call 911 if no relief after the 1st SL., Disp: 25 tablet, Rfl: 3    docusate (COLACE, DULCOLAX) 100 MG CAPS, Take 100 mg by mouth in the morning., Disp: , Rfl:     esomeprazole (NEXIUM) 40 MG delayed release capsule, TAKE 1 CAPSULE BY MOUTH EVERY DAY, Disp: , Rfl:     latanoprost (XALATAN) 0.005 % ophthalmic solution, INSTILL 1 DROP IN BOTH EYES EVERY EVENING, Disp: , Rfl:     aspirin 81 MG EC tablet, Take 81 mg by mouth, Disp: , Rfl:     Biotin 2.5 MG CAPS, Take 1 tablet by mouth , Disp: , Rfl:     Cetirizine HCl 10 MG CAPS, Take 1 capsule by mouth, Disp: , Rfl:     clobetasol (TEMOVATE) 0.05 % ointment, APPLY TO AFFECTED AREAS TWICE DAILY WHEN FLARES, Disp: , Rfl:     denosumab (PROLIA) 60 MG/ML SOSY SC injection, Inject 60 mg into the skin every 6 months , Disp: , Rfl:     famotidine (PEPCID) 40 MG tablet, Take 40 mg by mouth as needed , Disp: , Rfl:     fluticasone (FLONASE) 50 MCG/ACT nasal spray, 2 sprays by Nasal route daily, Disp: , Rfl:     polyethylene glycol (GLYCOLAX) 17 GM/SCOOP powder, Take 17 g by mouth daily, Disp: , Rfl:     Allergies   Allergen Reactions    Morphine Other (See Comments)     \"I almost checked out\"-Trendelenburg  \"I almost checked out\"-Trendelenburg    Nitrofurantoin Diarrhea     And Nausea    Oxycodone-Acetaminophen Diarrhea    Propranolol Other (See Comments)     \"Didn't sleep for 2 days and  I saw green splotches on 2 different days \"  \"Didn't sleep for 2 days and  I saw green splotches on 2 different days \"    Sulfa Antibiotics Other (See Comments)     As a child/ unknown reactions  As a child/ unknown reactions       Review of Systems   Constitutional:  Negative for chills and fever. HENT:  Positive for congestion, ear pain and rhinorrhea. Respiratory:  Positive for cough. Negative for shortness of breath and wheezing. Cardiovascular: Negative. Objective: There were no vitals taken for this visit. Examination:  Physical Exam  Neurological:      Mental Status: She is alert. Psychiatric:         Mood and Affect: Mood normal.         Thought Content: Thought content normal.         Judgment: Judgment normal.         Assessment/Plan:    Prachi Segal was seen today for follow-up. Diagnoses and all orders for this visit:    Lab test positive for detection of COVID-19 virus  Recommended adequate fluid intake, rest, self isolating; advised to monitor pulse ox at home and go to ED if oxygen saturation drops below 90%; patient is to call if no improvement or worsening symptoms; recommended rechecking Covid 19 test and continuing to quarantine; she is to notify our office of results;    -     methylPREDNISolone (MEDROL DOSEPACK) 4 MG tablet; Take by mouth. Cough  Instructed to take medications as prescribed, and to call if no improvement in symptoms. -     methylPREDNISolone (MEDROL DOSEPACK) 4 MG tablet; Take by mouth. -     benzonatate (TESSALON) 100 MG capsule; Take 1 capsule by mouth 3 times daily as needed for Cough    Otalgia of both ears  -     methylPREDNISolone (MEDROL DOSEPACK) 4 MG tablet; Take by mouth. Follow-up and Dispositions    Return if symptoms worsen or fail to improve. Medication Reconciliation:  Current Medications Verified: Current medications/ immunizations were reviewed, including purpose, with patient.   Family History, Social History, Current and Past Medical History was reviewed with patient and updated at today's office visit. Medication Reconciliation list was given to patient/ family. Patient was advised to discard old medication lists and provide all providers with current list at each visit and carry list with them in case of emergency.     Completed By:   Wendy Joseph MD    [facility] & [address], [address] [place] [address]  [phone]  [phone] fax  9:58 AM

## 2022-09-09 NOTE — TELEPHONE ENCOUNTER
Patient stated she had VV with Dr. Roach Necessary and declined to have Shalini Chapman called in for covid symptoms. She has now decided she would like an RX called in for the Shalini Chapman. [facility] to [facility]

## 2022-09-09 NOTE — TELEPHONE ENCOUNTER
Pt says you mentioned at her virtual visit that you would send in a Rx for a Zpak, but none was sent to her pharmacy. Please advise. Thank you.

## 2022-09-22 DIAGNOSIS — F51.04 CHRONIC INSOMNIA: ICD-10-CM

## 2022-09-22 DIAGNOSIS — F43.9 SITUATIONAL STRESS: ICD-10-CM

## 2022-09-22 RX ORDER — CLONAZEPAM 1 MG/1
TABLET ORAL
Qty: 75 TABLET | Refills: 2 | Status: SHIPPED | OUTPATIENT
Start: 2022-09-22 | End: 2022-10-31

## 2022-09-22 NOTE — TELEPHONE ENCOUNTER
[facility] [place] pt requesting that her Clonazepam refills go to [facility] on [address] [place] [address], please. Scripts last fill date: 7/8/22 for a 30 day supply.

## 2022-10-17 ENCOUNTER — TELEPHONE (OUTPATIENT)
Dept: INTERNAL MEDICINE CLINIC | Facility: CLINIC | Age: 72
End: 2022-10-17

## 2022-10-17 NOTE — TELEPHONE ENCOUNTER
----- Message from Katiana Antonio sent at 10/17/2022  9:39 AM EDT -----  Subject: Appointment Request    Reason for Call: Established Patient Appointment needed: Routine ED Follow   Up Visit    QUESTIONS    Reason for appointment request? Available appointments did not meet   patient need     Additional Information for Provider? Pt was seen at the urgent care on   10/9 for a UTI and stopped her antibiotic on Friday because she felt like   she was cramping all over because of it. UTI symptoms are getting a bit   better but still cramping. Medication is Nitrofurantoin 100mg. No appts   available in office to re check for UTI.  Please advise.   ---------------------------------------------------------------------------  --------------  Nevin Barrios [facility]  [phone]; OK to leave message on voicemail  ---------------------------------------------------------------------------  --------------  SCRIPT ANSWERS  COVID Screen: Jayson Oliveira

## 2022-10-18 ENCOUNTER — OFFICE VISIT (OUTPATIENT)
Dept: INTERNAL MEDICINE CLINIC | Facility: CLINIC | Age: 72
End: 2022-10-18
Payer: MEDICARE

## 2022-10-18 VITALS
OXYGEN SATURATION: 96 % | SYSTOLIC BLOOD PRESSURE: 120 MMHG | TEMPERATURE: 97.8 F | BODY MASS INDEX: 23.22 KG/M2 | WEIGHT: 136 LBS | DIASTOLIC BLOOD PRESSURE: 70 MMHG | HEART RATE: 74 BPM | HEIGHT: 64 IN

## 2022-10-18 DIAGNOSIS — N30.00 ACUTE CYSTITIS WITHOUT HEMATURIA: Primary | ICD-10-CM

## 2022-10-18 DIAGNOSIS — T88.7XXA NON-DOSE-RELATED ADVERSE EFFECT OF MEDICATION, INITIAL ENCOUNTER: ICD-10-CM

## 2022-10-18 DIAGNOSIS — R25.2 MUSCLE CRAMPS: ICD-10-CM

## 2022-10-18 DIAGNOSIS — R82.90 ABNORMAL FINDING ON URINALYSIS: ICD-10-CM

## 2022-10-18 DIAGNOSIS — Z23 NEED FOR VACCINATION: ICD-10-CM

## 2022-10-18 LAB
BILIRUBIN, URINE, POC: NEGATIVE
BLOOD URINE, POC: NORMAL
GLUCOSE URINE, POC: NEGATIVE
KETONES, URINE, POC: NEGATIVE
LEUKOCYTE ESTERASE, URINE, POC: NORMAL
NITRITE, URINE, POC: NORMAL
PH, URINE, POC: 5.5 (ref 4.6–8)
PROTEIN,URINE, POC: NEGATIVE
SPECIFIC GRAVITY, URINE, POC: 1.02 (ref 1–1.03)
URINALYSIS CLARITY, POC: CLEAR
URINALYSIS COLOR, POC: YELLOW
UROBILINOGEN, POC: 0.2

## 2022-10-18 PROCEDURE — G8484 FLU IMMUNIZE NO ADMIN: HCPCS | Performed by: PHYSICIAN ASSISTANT

## 2022-10-18 PROCEDURE — G8399 PT W/DXA RESULTS DOCUMENT: HCPCS | Performed by: PHYSICIAN ASSISTANT

## 2022-10-18 PROCEDURE — G8420 CALC BMI NORM PARAMETERS: HCPCS | Performed by: PHYSICIAN ASSISTANT

## 2022-10-18 PROCEDURE — 1123F ACP DISCUSS/DSCN MKR DOCD: CPT | Performed by: PHYSICIAN ASSISTANT

## 2022-10-18 PROCEDURE — 81003 URINALYSIS AUTO W/O SCOPE: CPT | Performed by: PHYSICIAN ASSISTANT

## 2022-10-18 PROCEDURE — 99213 OFFICE O/P EST LOW 20 MIN: CPT | Performed by: PHYSICIAN ASSISTANT

## 2022-10-18 PROCEDURE — G0008 ADMIN INFLUENZA VIRUS VAC: HCPCS | Performed by: PHYSICIAN ASSISTANT

## 2022-10-18 PROCEDURE — 1090F PRES/ABSN URINE INCON ASSESS: CPT | Performed by: PHYSICIAN ASSISTANT

## 2022-10-18 PROCEDURE — 3017F COLORECTAL CA SCREEN DOC REV: CPT | Performed by: PHYSICIAN ASSISTANT

## 2022-10-18 PROCEDURE — 90694 VACC AIIV4 NO PRSRV 0.5ML IM: CPT | Performed by: PHYSICIAN ASSISTANT

## 2022-10-18 PROCEDURE — G8427 DOCREV CUR MEDS BY ELIG CLIN: HCPCS | Performed by: PHYSICIAN ASSISTANT

## 2022-10-18 PROCEDURE — 1036F TOBACCO NON-USER: CPT | Performed by: PHYSICIAN ASSISTANT

## 2022-10-18 RX ORDER — FLUOROURACIL 50 MG/G
CREAM TOPICAL
COMMUNITY
Start: 2022-09-15

## 2022-10-18 ASSESSMENT — PATIENT HEALTH QUESTIONNAIRE - PHQ9
SUM OF ALL RESPONSES TO PHQ9 QUESTIONS 1 & 2: 0
2. FEELING DOWN, DEPRESSED OR HOPELESS: 0
SUM OF ALL RESPONSES TO PHQ QUESTIONS 1-9: 0
1. LITTLE INTEREST OR PLEASURE IN DOING THINGS: 0
SUM OF ALL RESPONSES TO PHQ QUESTIONS 1-9: 0

## 2022-10-18 ASSESSMENT — ENCOUNTER SYMPTOMS
ABDOMINAL PAIN: 0
DIARRHEA: 1
BACK PAIN: 0
VOMITING: 0
NAUSEA: 1

## 2022-10-18 NOTE — PATIENT INSTRUCTIONS
Emphasized the importance of rehydrating with electrolyte containing drinks over the next 3-4 days  Stay well hydrated with plenty of water to help prevent recurrence of infection  Continue chronic medications as prescribed  Recommend getting newest COVID vaccine given formulation that should better protect against the most recently circulating Omicron variant      Patient Education        influenza virus vaccine (injection)  Pronunciation:  IN floo [place] alyse PEREZK seen  Brand:  Afluria PF Pediatric Quadrivalent 7965-7480, Afluria PF Quadrivalent [id number], Afluria Quadrivalent 9983-2451, Fluarix PF Quadrivalent [id number], Flublok Quadrivalent [id number], Flucelvax PF Quadrivalent [id number], Flucelvax Quadrivalent 7484-1216, FluLaval PF Quadrivalent 4629-6510, Fluzone High-Dose Quadrivalent PF [id number], Fluzone PF Pediatric Quadrivalent 2179-1290, Fluzone PF Quadrivalent [id number], Fluzone Quadrivalent [id number]  What is the most important information I should know about this vaccine? Influenza virus vaccine is made from \"killed viruses\" and will not cause you to become ill with the flu virus. What is influenza virus vaccine? Influenza virus (\"the flu\") is a contagious disease caused by a virus that can spread from one person to another through the air or on surfaces. Flu symptoms include fever, chills, tiredness, aches, sore throat, cough, vomiting, and diarrhea. The flu can also cause sinus infections, ear infections, bronchitis, or serious complications such as pneumonia. Influenza causes thousands of deaths each year, and hundreds of thousands of hospitalizations. Influenza is most dangerous in children, pregnant women, older adults, and people with weak immune systems or health problems such as diabetes, heart disease, or cancer. Influenza virus vaccine is for use in adults and children at least 6 months old, to prevent infection caused by influenza virus.  This vaccine helps your body develop immunity to the disease, but will not treat an active infection you already have. Influenza virus vaccine is redeveloped each year to contain specific strains of inactivated (killed) flu virus that are recommended by public health officials for that year. The injectable influenza virus vaccine (flu shot) is made from \"killed viruses. \" Influenza virus vaccine is also available in a nasal spray form, which is a \"live virus\" vaccine. This medication guide addresses only the injectable form of this vaccine. Like any vaccine, influenza virus vaccine may not provide protection from disease in every person. What should I discuss with my healthcare provider before receiving this vaccine? You may not be able to receive this vaccine if you are allergic to eggs, or if you have ever had a severe allergic reaction to a flu vaccine. Tell your vaccination provider if you have:  a weak immune system (caused by disease or by using certain medicine); or  a history of Guillain-Roberts syndrome (within 6 weeks after receiving a flu vaccine). You can still receive a vaccine if you have a minor cold. In the case of a more severe illness with a fever or any type of infection, wait until you get better before receiving this vaccine. Tell your vaccination provider if you are pregnant or breastfeeding. The Centers for Disease Control and Prevention recommends that pregnant women get a flu shot during any trimester of pregnancy to protect themselves and their  babies from flu. The nasal spray form of influenza vaccine is not recommended for use in pregnant women. How is this vaccine given? Some brands of this vaccine are made for use in adults and not in children. Your child's vaccination provider can recommend the best influenza virus vaccine for your child. This vaccine is given as an injection (shot) into a muscle. Children 6 months to 6years old may need a second flu shot 4 weeks after receiving the first vaccine.   The influenza virus vaccine is usually given in October or November. Follow your doctor's instructions or the schedule recommended by your local health department. Since the influenza virus vaccine is redeveloped each year for specific strains of influenza, you should receive a flu vaccine every year. What happens if I miss a dose? Call your doctor if you forget to receive your yearly flu shot in October or November, or if your child misses a booster dose. What happens if I overdose? An overdose of this vaccine is unlikely to occur. What should I avoid before or after receiving this vaccine? Follow your vaccination provider's instructions about any restrictions on food, beverages, or activity. What are the possible side effects of influenza virus vaccine? Get emergency medical help if you have signs of an allergic reaction: hives; difficulty breathing; swelling of your face, lips, tongue, or throat. You should not receive a booster vaccine if you had a life threatening allergic reaction after the first shot. Keep track of any and all side effects you have. If you receive an influenza virus vaccine in the future, you will need to tell the vaccination provider if the previous shot caused any side effects. Influenza virus vaccine is made from \"killed viruses\" and will not cause you to become ill with the flu virus. You may have flu-like symptoms at any time during flu season that may be caused by other strains of influenza virus. Call your doctor at once if you have:  a light-headed feeling, like you might pass out;  severe weakness or unusual feeling in your arms and legs;  numbness, pain, tingling, burning or prickly feeling;  vision or hearing problems; or  a fever higher than 101 degrees F.   Common side effects may include:  pain, redness, tenderness, swelling, bruising, or a hard lump where the shot was given;  diarrhea, loss of appetite;  muscle pain;  headache, tiredness; or  fussiness, crying, or drowsiness in a child. This is not a complete list of side effects and others may occur. Call your doctor for medical advice about side effects. You may report vaccine side effects to the [address] [place] [address] and Human Services at [phone]. What other drugs will affect influenza virus vaccine? If you are using any of these medications, you may not be able to receive the vaccine, or may need to wait until the other treatments are finished:  steroid medicine;  medications to treat psoriasis, rheumatoid arthritis, or other autoimmune disorders; or  medicines to treat or prevent organ transplant rejection. This list is not complete. Other drugs may affect influenza virus vaccine, including prescription and over-the-counter medicines, vitamins, and herbal products. Not all possible drug interactions are listed here. Where can I get more information? Your vaccination provider, pharmacist, or doctor can provide more information about this vaccine. Additional information is available from your local health department or the Centers for Disease Control and Prevention. Remember, keep this and all other medicines out of the reach of children, never share your medicines with others, and use this medication only for the indication prescribed. Every effort has been made to ensure that the information provided by [address] is accurate, up-to-date, and complete, but no guarantee is made to that effect. Drug information contained herein may be time sensitive. [facility] information has been compiled for use by healthcare practitioners and consumers in the [place] and therefore Jennifer does not warrant that uses outside of the [place] are appropriate, unless specifically indicated otherwise. [employer] drug information does not endorse drugs, diagnose patients or recommend therapy.  [facility] drug information is an informational resource designed to assist licensed healthcare practitioners in caring for their patients and/or to serve consumers viewing this service as a supplement to, and not a substitute for, the expertise, skill, knowledge and judgment of healthcare practitioners. The absence of a warning for a given drug or drug combination in no way should be construed to indicate that the drug or drug combination is safe, effective or appropriate for any given patient. [facility] does not assume any responsibility for any aspect of healthcare administered with the aid of information [facility] provides. The information contained herein is not intended to cover all possible uses, directions, precautions, warnings, drug interactions, allergic reactions, or adverse effects. If you have questions about the drugs you are taking, check with your doctor, nurse or pharmacist.  Copyright 6711-9430 [address]. Version: 9.01. Revision date: 10/7/2021. Care instructions adapted under license by [facility] ([facility]). If you have questions about a medical condition or this instruction, always ask your healthcare professional. [place] [address] any warranty or liability for your use of this information.

## 2022-10-18 NOTE — PROGRESS NOTES
Angus Sam (:  1950) is a 74 y.o. female,Established patient, here for evaluation of the following chief complaint(s):  Urinary Tract Infection and Medication Problem (cramps)         ASSESSMENT/PLAN:  1. Acute cystitis without hematuria  -     AMB POC URINALYSIS DIP STICK AUTO W/O MICRO  2. Need for vaccination  -     Influenza, FLUAD, (age 72 y+), IM, Preservative Free, 0.5 mL  3. Abnormal finding on urinalysis  -     Culture, Urine; Future  4. Muscle cramps  5. Non-dose-related adverse effect of medication, initial encounter    Patient Instructions   Emphasized the importance of rehydrating with electrolyte containing drinks over the next 3-4 days  Stay well hydrated with plenty of water to help prevent recurrence of infection  Continue chronic medications as prescribed  Recommend getting newest COVID vaccine given formulation that should better protect against the most recently circulating Omicron variant      Patient Education        influenza virus vaccine (injection)  Pronunciation:  IN floo EN mecca PEREZK seen  Brand:  Afluria PF Pediatric Quadrivalent [id number], Afluria PF Quadrivalent [id number], Afluria Quadrivalent [id number], Fluarix PF Quadrivalent [id number], Flublok Quadrivalent [id number], Flucelvax PF Quadrivalent 2949-7267, Flucelvax Quadrivalent 1190-3219, FluLaval PF Quadrivalent 1608-8262, Fluzone High-Dose Quadrivalent PF [id number], Fluzone PF Pediatric Quadrivalent [id number], Fluzone PF Quadrivalent [id number], Fluzone Quadrivalent [id number]  What is the most important information I should know about this vaccine? Influenza virus vaccine is made from \"killed viruses\" and will not cause you to become ill with the flu virus. What is influenza virus vaccine? Influenza virus (\"the flu\") is a contagious disease caused by a virus that can spread from one person to another through the air or on surfaces.  Flu symptoms include fever, chills, tiredness, aches, sore throat, cough, vomiting, and diarrhea. The flu can also cause sinus infections, ear infections, bronchitis, or serious complications such as pneumonia. Influenza causes thousands of deaths each year, and hundreds of thousands of hospitalizations. Influenza is most dangerous in children, pregnant women, older adults, and people with weak immune systems or health problems such as diabetes, heart disease, or cancer. Influenza virus vaccine is for use in adults and children at least 6 months old, to prevent infection caused by influenza virus. This vaccine helps your body develop immunity to the disease, but will not treat an active infection you already have. Influenza virus vaccine is redeveloped each year to contain specific strains of inactivated (killed) flu virus that are recommended by public health officials for that year. The injectable influenza virus vaccine (flu shot) is made from \"killed viruses. \" Influenza virus vaccine is also available in a nasal spray form, which is a \"live virus\" vaccine. This medication guide addresses only the injectable form of this vaccine. Like any vaccine, influenza virus vaccine may not provide protection from disease in every person. What should I discuss with my healthcare provider before receiving this vaccine? You may not be able to receive this vaccine if you are allergic to eggs, or if you have ever had a severe allergic reaction to a flu vaccine. Tell your vaccination provider if you have:  a weak immune system (caused by disease or by using certain medicine); or  a history of Guillain[place] syndrome (within 6 weeks after receiving a flu vaccine). You can still receive a vaccine if you have a minor cold. In the case of a more severe illness with a fever or any type of infection, wait until you get better before receiving this vaccine. Tell your vaccination provider if you are pregnant or breastfeeding.   The Centers for Disease Control and Prevention recommends that pregnant women get a flu shot during any trimester of pregnancy to protect themselves and their  babies from flu. The nasal spray form of influenza vaccine is not recommended for use in pregnant women. How is this vaccine given? Some brands of this vaccine are made for use in adults and not in children. Your child's vaccination provider can recommend the best influenza virus vaccine for your child. This vaccine is given as an injection (shot) into a muscle. Children 6 months to 6years old may need a second flu shot 4 weeks after receiving the first vaccine. The influenza virus vaccine is usually given in October or November. Follow your doctor's instructions or the schedule recommended by your local health department. Since the influenza virus vaccine is redeveloped each year for specific strains of influenza, you should receive a flu vaccine every year. What happens if I miss a dose? Call your doctor if you forget to receive your yearly flu shot in October or November, or if your child misses a booster dose. What happens if I overdose? An overdose of this vaccine is unlikely to occur. What should I avoid before or after receiving this vaccine? Follow your vaccination provider's instructions about any restrictions on food, beverages, or activity. What are the possible side effects of influenza virus vaccine? Get emergency medical help if you have signs of an allergic reaction: hives; difficulty breathing; swelling of your face, lips, tongue, or throat. You should not receive a booster vaccine if you had a life threatening allergic reaction after the first shot. Keep track of any and all side effects you have. If you receive an influenza virus vaccine in the future, you will need to tell the vaccination provider if the previous shot caused any side effects. Influenza virus vaccine is made from \"killed viruses\" and will not cause you to become ill with the flu virus.  You may have flu-like symptoms at any time during flu season that may be caused by other strains of influenza virus. Call your doctor at once if you have:  a light-headed feeling, like you might pass out;  severe weakness or unusual feeling in your arms and legs;  numbness, pain, tingling, burning or prickly feeling;  vision or hearing problems; or  a fever higher than 101 degrees F. Common side effects may include:  pain, redness, tenderness, swelling, bruising, or a hard lump where the shot was given;  diarrhea, loss of appetite;  muscle pain;  headache, tiredness; or  fussiness, crying, or drowsiness in a child. This is not a complete list of side effects and others may occur. Call your doctor for medical advice about side effects. You may report vaccine side effects to the [place] [address] and Human Services at [phone]. What other drugs will affect influenza virus vaccine? If you are using any of these medications, you may not be able to receive the vaccine, or may need to wait until the other treatments are finished:  steroid medicine;  medications to treat psoriasis, rheumatoid arthritis, or other autoimmune disorders; or  medicines to treat or prevent organ transplant rejection. This list is not complete. Other drugs may affect influenza virus vaccine, including prescription and over-the-counter medicines, vitamins, and herbal products. Not all possible drug interactions are listed here. Where can I get more information? Your vaccination provider, pharmacist, or doctor can provide more information about this vaccine. Additional information is available from your local health department or the Centers for Disease Control and Prevention. Remember, keep this and all other medicines out of the reach of children, never share your medicines with others, and use this medication only for the indication prescribed.    Every effort has been made to ensure that the information provided by [address] is accurate, up-to-date, and complete, but no guarantee is made to that effect. Drug information contained herein may be time sensitive. [employer] information has been compiled for use by healthcare practitioners and consumers in the [place] and therefore [employer] does not warrant that uses outside of the [place] are appropriate, unless specifically indicated otherwise. [facility]'s drug information does not endorse drugs, diagnose patients or recommend therapy. [employer] drug information is an informational resource designed to assist licensed healthcare practitioners in caring for their patients and/or to serve consumers viewing this service as a supplement to, and not a substitute for, the expertise, skill, knowledge and judgment of healthcare practitioners. The absence of a warning for a given drug or drug combination in no way should be construed to indicate that the drug or drug combination is safe, effective or appropriate for any given patient. [facility] does not assume any responsibility for any aspect of healthcare administered with the aid of information [facility] provides. The information contained herein is not intended to cover all possible uses, directions, precautions, warnings, drug interactions, allergic reactions, or adverse effects. If you have questions about the drugs you are taking, check with your doctor, nurse or pharmacist.  Copyright 4199-1436 [address]. Version: 9.01. Revision date: 10/7/2021. Care instructions adapted under license by [facility] [place] ([facility]). If you have questions about a medical condition or this instruction, always ask your healthcare professional. [place] [address] any warranty or liability for your use of this information. Return if symptoms worsen or fail to improve. Subjective   SUBJECTIVE/OBJECTIVE:  Patient is here for follow-up of UTI.   The current state of this condition is improved on Macrobid 100 mg bid + Pyridium 100 mg tid prn dysuria - not taking Macrobid as prescribed since Friday, adverse effects: severe muscle cramps, nausea, & diarrhea. She was diagnosed with a UTI at [facility] on [address] [place] [address] about 10 days ago with symptoms of burning with urination, suprapubic abdominal pain, urinary frequency, and urgency. Review of Systems   Constitutional:  Positive for fatigue. Negative for chills and fever. Gastrointestinal:  Positive for diarrhea (mild) and nausea (associated with antibiotic). Negative for abdominal pain (suprapubic - resolved) and vomiting. Genitourinary:  Negative for dysuria (resolved), frequency (resolved), hematuria and urgency (resolved). Musculoskeletal:  Positive for arthralgias (bilateral hips) and myalgias (while on antibiotic). Negative for back pain. Neurological:  Positive for weakness (while on antibiotic). /70 (Site: Left Upper Arm, Position: Sitting, Cuff Size: Large Adult)   Pulse 74   Temp 97.8 °F (36.6 °C) (Temporal)   Ht 5' 4\" (1.626 m)   Wt 136 lb (61.7 kg)   SpO2 96%   BMI 23.34 kg/m²       Objective   Physical Exam  Constitutional:       Appearance: Normal appearance. HENT:      Head: Normocephalic and atraumatic. Eyes:      Conjunctiva/sclera: Conjunctivae normal.      Pupils: Pupils are equal, round, and reactive to light. Neck:      Vascular: No carotid bruit. Cardiovascular:      Rate and Rhythm: Normal rate and regular rhythm. Heart sounds: Normal heart sounds. Pulmonary:      Effort: Pulmonary effort is normal.      Breath sounds: Normal breath sounds. Musculoskeletal:         General: Normal range of motion. Cervical back: Normal range of motion. Skin:     General: Skin is warm and dry. Neurological:      Mental Status: She is alert and oriented to person, place, and time. Psychiatric:         Mood and Affect: Mood normal.         Behavior: Behavior normal.         Thought Content:  Thought content normal.         Judgment: Judgment normal. Results for orders placed or performed in visit on 10/18/22   AMB POC URINALYSIS DIP STICK AUTO W/O MICRO   Result Value Ref Range    Color, Urine, POC YELLOW     Clarity, Urine, POC CLEAR     Glucose, Urine, POC Negative Negative    Bilirubin, Urine, POC Negative Negative    Ketones, Urine, POC Negative Negative    Specific Gravity, Urine, POC 1.020 1.001 - 1.035    Blood, Urine, POC NEG Negative    pH, Urine, POC 5.5 4.6 - 8.0    Protein, Urine, POC Negative Negative    Urobilinogen, POC 0.2     Nitrate, Urine, POC NEG Negative    Leukocyte Esterase, Urine, POC Trace Negative           An electronic signature was used to authenticate this note.     --Jerre Landau, PA-C

## 2022-10-21 LAB
BACTERIA SPEC CULT: NORMAL
SERVICE CMNT-IMP: NORMAL

## 2022-10-24 ENCOUNTER — TELEPHONE (OUTPATIENT)
Dept: INTERNAL MEDICINE CLINIC | Facility: CLINIC | Age: 72
End: 2022-10-24

## 2022-10-31 ENCOUNTER — NURSE ONLY (OUTPATIENT)
Dept: INTERNAL MEDICINE CLINIC | Facility: CLINIC | Age: 72
End: 2022-10-31
Payer: MEDICARE

## 2022-10-31 ENCOUNTER — TELEPHONE (OUTPATIENT)
Dept: INTERNAL MEDICINE CLINIC | Facility: CLINIC | Age: 72
End: 2022-10-31

## 2022-10-31 DIAGNOSIS — R30.9 PAIN WITH URINATION: ICD-10-CM

## 2022-10-31 DIAGNOSIS — R30.0 BURNING WITH URINATION: ICD-10-CM

## 2022-10-31 DIAGNOSIS — R82.90 ABNORMAL FINDING ON URINALYSIS: Primary | ICD-10-CM

## 2022-10-31 DIAGNOSIS — R82.90 ABNORMAL FINDING ON URINALYSIS: ICD-10-CM

## 2022-10-31 LAB
BILIRUBIN, URINE, POC: NEGATIVE
BLOOD URINE, POC: 1
GLUCOSE URINE, POC: NEGATIVE
KETONES, URINE, POC: NEGATIVE
LEUKOCYTE ESTERASE, URINE, POC: NORMAL
NITRITE, URINE, POC: NORMAL
PH, URINE, POC: 5.5 (ref 4.6–8)
PROTEIN,URINE, POC: NEGATIVE
SPECIFIC GRAVITY, URINE, POC: 1 (ref 1–1.03)
URINALYSIS CLARITY, POC: CLEAR
URINALYSIS COLOR, POC: YELLOW
UROBILINOGEN, POC: 0.2

## 2022-10-31 PROCEDURE — 81003 URINALYSIS AUTO W/O SCOPE: CPT | Performed by: INTERNAL MEDICINE

## 2022-10-31 NOTE — TELEPHONE ENCOUNTER
Pt called requesting an appt for UTI sx's. No appts available. Her spouse has a VV with your today at 2:00 for flu-like sx's. Do you want to see pt around same time as him and have her come in this morning to leave a UA specimen? Please advise.

## 2022-10-31 NOTE — RESULT ENCOUNTER NOTE
Urine shows 1+ leukocytes - will be sent for culture. Results will be discussed with patient during upcoming office visit.

## 2022-10-31 NOTE — TELEPHONE ENCOUNTER
----- Message from Rick Kwok sent at 10/31/2022 10:12 AM EDT -----  Subject: Message to Provider    QUESTIONS  Information for Provider? Patient requesting to be seen for a UTI (pain   and burning with urination). Patient also wants to get a urine sample done   to be able to get medications to treat the UTI.  ---------------------------------------------------------------------------  --------------  Poornima Sainz INFO  [phone]; OK to leave message on voicemail  ---------------------------------------------------------------------------  --------------  SCRIPT ANSWERS  Relationship to Patient? Self  Have you recently (14 days) seen a provider for this problem?  Yes

## 2022-11-01 ENCOUNTER — OFFICE VISIT (OUTPATIENT)
Dept: INTERNAL MEDICINE CLINIC | Facility: CLINIC | Age: 72
End: 2022-11-01
Payer: MEDICARE

## 2022-11-01 VITALS
BODY MASS INDEX: 22.88 KG/M2 | HEART RATE: 85 BPM | HEIGHT: 64 IN | OXYGEN SATURATION: 98 % | WEIGHT: 134 LBS | TEMPERATURE: 99.5 F | DIASTOLIC BLOOD PRESSURE: 60 MMHG | SYSTOLIC BLOOD PRESSURE: 115 MMHG

## 2022-11-01 DIAGNOSIS — R05.1 ACUTE COUGH: ICD-10-CM

## 2022-11-01 DIAGNOSIS — R39.89 SUSPECTED UTI: Primary | ICD-10-CM

## 2022-11-01 DIAGNOSIS — R50.9 FEVER AND CHILLS: ICD-10-CM

## 2022-11-01 DIAGNOSIS — J06.9 ACUTE URI OF MULTIPLE SITES: ICD-10-CM

## 2022-11-01 LAB
EXP DATE SOLUTION: NORMAL
EXP DATE SWAB: NORMAL
EXPIRATION DATE: NORMAL
LOT NUMBER POC: NORMAL
LOT NUMBER SOLUTION: NORMAL
LOT NUMBER SWAB: NORMAL
QUICKVUE INFLUENZA TEST: NEGATIVE
SARS-COV-2 RNA, POC: NEGATIVE
VALID INTERNAL CONTROL, POC: NORMAL

## 2022-11-01 PROCEDURE — 99214 OFFICE O/P EST MOD 30 MIN: CPT | Performed by: PHYSICIAN ASSISTANT

## 2022-11-01 PROCEDURE — G8484 FLU IMMUNIZE NO ADMIN: HCPCS | Performed by: PHYSICIAN ASSISTANT

## 2022-11-01 PROCEDURE — 87804 INFLUENZA ASSAY W/OPTIC: CPT | Performed by: PHYSICIAN ASSISTANT

## 2022-11-01 PROCEDURE — G8399 PT W/DXA RESULTS DOCUMENT: HCPCS | Performed by: PHYSICIAN ASSISTANT

## 2022-11-01 PROCEDURE — 1090F PRES/ABSN URINE INCON ASSESS: CPT | Performed by: PHYSICIAN ASSISTANT

## 2022-11-01 PROCEDURE — 3017F COLORECTAL CA SCREEN DOC REV: CPT | Performed by: PHYSICIAN ASSISTANT

## 2022-11-01 PROCEDURE — G8427 DOCREV CUR MEDS BY ELIG CLIN: HCPCS | Performed by: PHYSICIAN ASSISTANT

## 2022-11-01 PROCEDURE — G8420 CALC BMI NORM PARAMETERS: HCPCS | Performed by: PHYSICIAN ASSISTANT

## 2022-11-01 PROCEDURE — 1036F TOBACCO NON-USER: CPT | Performed by: PHYSICIAN ASSISTANT

## 2022-11-01 PROCEDURE — 1123F ACP DISCUSS/DSCN MKR DOCD: CPT | Performed by: PHYSICIAN ASSISTANT

## 2022-11-01 PROCEDURE — 87635 SARS-COV-2 COVID-19 AMP PRB: CPT | Performed by: PHYSICIAN ASSISTANT

## 2022-11-01 RX ORDER — CEPHALEXIN 500 MG/1
500 CAPSULE ORAL 3 TIMES DAILY
Qty: 30 CAPSULE | Refills: 0 | Status: SHIPPED | OUTPATIENT
Start: 2022-11-01 | End: 2022-11-11

## 2022-11-01 ASSESSMENT — PATIENT HEALTH QUESTIONNAIRE - PHQ9
2. FEELING DOWN, DEPRESSED OR HOPELESS: 0
SUM OF ALL RESPONSES TO PHQ QUESTIONS 1-9: 0
SUM OF ALL RESPONSES TO PHQ9 QUESTIONS 1 & 2: 0
SUM OF ALL RESPONSES TO PHQ QUESTIONS 1-9: 0
1. LITTLE INTEREST OR PLEASURE IN DOING THINGS: 0

## 2022-11-01 ASSESSMENT — ENCOUNTER SYMPTOMS
NAUSEA: 0
VOMITING: 0
ABDOMINAL PAIN: 1
COUGH: 1
RHINORRHEA: 0
DIARRHEA: 1
SORE THROAT: 1
WHEEZING: 0
SHORTNESS OF BREATH: 0

## 2022-11-01 NOTE — PROGRESS NOTES
Angie Paredes (:  1950) is a 74 y.o. female,Established patient, here for evaluation of the following chief complaint(s):  Urinary Tract Infection and URI         ASSESSMENT/PLAN:  1. Suspected UTI  -     cephALEXin (KEFLEX) 500 MG capsule; Take 1 capsule by mouth 3 times daily for 10 days, Disp-30 capsule, R-0Normal  2. Fever and chills  -     AMB POC RAPID INFLUENZA TEST  -     AMB POC COVID-19 COV  3. Acute URI of multiple sites  -     AMB POC RAPID INFLUENZA TEST  -     AMB POC COVID-19 COV  4. Acute cough  -     AMB POC RAPID INFLUENZA TEST  -     AMB POC COVID-19 COV      Patient Instructions   Start Keflex 500 mg tid x 10 days  Advised OTC Mucinex DM 12 Hour twice daily while on antibiotic  Counseled patient that her cold symptoms are mostly likely viral and will take a natural course of improving after 5-7 days  Emphasized the importance of staying well hydrated with plenty of water  Continue chronic medications as prescribed  Get plenty of rest  Reduce fever with OTC Tylenol as needed      Return in about 20 days (around 2022) for previously scheduled appt. Subjective   SUBJECTIVE/OBJECTIVE:  Dysuria   This is a recurrent problem. Episode onset: 4 days ago. The problem has been unchanged. The quality of the pain is described as burning. The pain is moderate. There has been no fever. Associated symptoms include chills, frequency and urgency. Pertinent negatives include no flank pain, hematuria, nausea or vomiting. She has tried increased fluids and home medications (AZO) for the symptoms. The treatment provided mild relief.  Her past medical history is significant for recurrent UTIs. was recently treated for UTI at urgent care earlier this month but did not tolerate antibiotic prescribed - Macrobid so stopped early       Additional concerns addressed today include developing cold/viral symptoms yesterday afternoon/evening to include bilateral ear pain, headache, body aches, sore throat, dry cough - worse when supine, chills, fever (up to 101 during the night), and night sweats.  has been sick with similar symptoms. She has been taking Tylenol with some relief of fever. Flu shot is up-to-date (10/18/22) with 4 doses of COVID vaccine so far. Review of Systems   Constitutional:  Positive for chills, diaphoresis, fatigue and fever. HENT:  Positive for ear pain, postnasal drip and sore throat. Negative for congestion, rhinorrhea and sneezing. Respiratory:  Positive for cough (dry). Negative for shortness of breath and wheezing. Positive for chest congestion   Gastrointestinal:  Positive for abdominal pain (suprapubic pressure) and diarrhea. Negative for nausea and vomiting. Genitourinary:  Positive for dysuria, frequency and urgency. Negative for flank pain and hematuria. Musculoskeletal:  Positive for myalgias. Neurological:  Positive for headaches. Negative for dizziness. /60 (Site: Left Upper Arm, Position: Sitting, Cuff Size: Large Adult)   Pulse 85   Temp 99.5 °F (37.5 °C) (Temporal)   Ht 5' 4\" (1.626 m)   Wt 134 lb (60.8 kg)   SpO2 98%   BMI 23.00 kg/m²       Objective   Physical Exam  Constitutional:       Appearance: Normal appearance. HENT:      Head: Normocephalic and atraumatic. Right Ear: Tympanic membrane, ear canal and external ear normal.      Left Ear: Tympanic membrane, ear canal and external ear normal.      Nose: Nose normal.      Mouth/Throat:      Mouth: Mucous membranes are moist.      Pharynx: Oropharynx is clear. Eyes:      Conjunctiva/sclera: Conjunctivae normal.      Pupils: Pupils are equal, round, and reactive to light. Neck:      Vascular: No carotid bruit. Cardiovascular:      Rate and Rhythm: Normal rate and regular rhythm. Heart sounds: Normal heart sounds. Pulmonary:      Effort: Pulmonary effort is normal.      Breath sounds: Normal breath sounds.    Abdominal:      General: Bowel sounds are normal.      Palpations: Abdomen is soft. Tenderness: There is generalized abdominal tenderness (vague) and tenderness in the suprapubic area. There is no right CVA tenderness or left CVA tenderness. Musculoskeletal:         General: Normal range of motion. Cervical back: Normal range of motion. Skin:     General: Skin is warm and dry. Neurological:      Mental Status: She is alert and oriented to person, place, and time. Psychiatric:         Mood and Affect: Mood normal.         Behavior: Behavior normal.         Thought Content: Thought content normal.         Judgment: Judgment normal.            Recent Results (from the past 336 hour(s))   AMB POC URINALYSIS DIP STICK AUTO W/O MICRO    Collection Time: 10/31/22  3:56 PM   Result Value Ref Range    Color, Urine, POC YELLOW     Clarity, Urine, POC CLEAR     Glucose, Urine, POC Negative Negative    Bilirubin, Urine, POC Negative Negative    Ketones, Urine, POC Negative Negative    Specific Gravity, Urine, POC 1.005 1.001 - 1.035    Blood, Urine, POC 1 Negative    pH, Urine, POC 5.5 4.6 - 8.0    Protein, Urine, POC Negative Negative    Urobilinogen, POC 0.2     Nitrate, Urine, POC NEG Negative    Leukocyte Esterase, Urine, POC 1+ Negative   Culture, Urine    Collection Time: 10/31/22  4:15 PM    Specimen: Urine, clean catch   Result Value Ref Range    Special Requests NO SPECIAL REQUESTS      Culture        No growth after short period of incubation. Further results to follow after overnight incubation.    AMB POC RAPID INFLUENZA TEST    Collection Time: 11/01/22 11:24 AM   Result Value Ref Range    Valid Internal Control, POC NORMAL     QuickVue Influenza test Negative Negative   AMB POC COVID-19 COV    Collection Time: 11/01/22 11:25 AM   Result Value Ref Range    SARS-COV-2 RNA, POC Negative     Lot number swab [id number]     EXP date swab 03/13/2025     Lot number solution [id number]     EXP date solution 07/10/2024     LOT NUMBER POC EXPIRATION DATE           An electronic signature was used to authenticate this note.     --Patel Weston PA-C

## 2022-11-01 NOTE — PATIENT INSTRUCTIONS
Start Keflex 500 mg tid x 10 days  Advised OTC Mucinex DM 12 Hour twice daily while on antibiotic  Counseled patient that her cold symptoms are mostly likely viral and will take a natural course of improving after 5-7 days  Emphasized the importance of staying well hydrated with plenty of water  Continue chronic medications as prescribed  Get plenty of rest  Reduce fever with OTC Tylenol as needed

## 2022-11-02 LAB
BACTERIA SPEC CULT: NORMAL
BACTERIA SPEC CULT: NORMAL
SERVICE CMNT-IMP: NORMAL

## 2022-11-03 NOTE — RESULT ENCOUNTER NOTE
Due to the natural course a virus takes she will likely feel worse before she feels better - symptoms usually peak between day #3-5 then start to improve from there. She is within that exact window now. Complete full course of antibiotic and be sure to be taking Mucinex DM 12 Hour twice daily.

## 2022-11-03 NOTE — RESULT ENCOUNTER NOTE
Urine culture was inconclusive - the lab stated multiple organisms present which indicate improper collection technique. Unfortunately, we cannot repeat it because she is already on antibiotics. Is she feeling any improvement in urinary symptoms?

## 2022-11-07 ENCOUNTER — OFFICE VISIT (OUTPATIENT)
Dept: INTERNAL MEDICINE CLINIC | Facility: CLINIC | Age: 72
End: 2022-11-07
Payer: MEDICARE

## 2022-11-07 VITALS
WEIGHT: 134 LBS | SYSTOLIC BLOOD PRESSURE: 130 MMHG | DIASTOLIC BLOOD PRESSURE: 70 MMHG | HEART RATE: 80 BPM | BODY MASS INDEX: 22.88 KG/M2 | HEIGHT: 64 IN | OXYGEN SATURATION: 98 % | TEMPERATURE: 98.1 F

## 2022-11-07 DIAGNOSIS — J20.9 ACUTE BRONCHITIS, UNSPECIFIED ORGANISM: Primary | ICD-10-CM

## 2022-11-07 PROBLEM — C43.71 MALIGNANT MELANOMA OF RIGHT LOWER EXTREMITY INCLUDING HIP (HCC): Status: RESOLVED | Noted: 2018-10-19 | Resolved: 2022-11-07

## 2022-11-07 PROCEDURE — 94640 AIRWAY INHALATION TREATMENT: CPT | Performed by: PHYSICIAN ASSISTANT

## 2022-11-07 PROCEDURE — 1123F ACP DISCUSS/DSCN MKR DOCD: CPT | Performed by: PHYSICIAN ASSISTANT

## 2022-11-07 PROCEDURE — G8399 PT W/DXA RESULTS DOCUMENT: HCPCS | Performed by: PHYSICIAN ASSISTANT

## 2022-11-07 PROCEDURE — 1036F TOBACCO NON-USER: CPT | Performed by: PHYSICIAN ASSISTANT

## 2022-11-07 PROCEDURE — 3017F COLORECTAL CA SCREEN DOC REV: CPT | Performed by: PHYSICIAN ASSISTANT

## 2022-11-07 PROCEDURE — 99213 OFFICE O/P EST LOW 20 MIN: CPT | Performed by: PHYSICIAN ASSISTANT

## 2022-11-07 PROCEDURE — G8427 DOCREV CUR MEDS BY ELIG CLIN: HCPCS | Performed by: PHYSICIAN ASSISTANT

## 2022-11-07 PROCEDURE — G8420 CALC BMI NORM PARAMETERS: HCPCS | Performed by: PHYSICIAN ASSISTANT

## 2022-11-07 PROCEDURE — 1090F PRES/ABSN URINE INCON ASSESS: CPT | Performed by: PHYSICIAN ASSISTANT

## 2022-11-07 PROCEDURE — G8484 FLU IMMUNIZE NO ADMIN: HCPCS | Performed by: PHYSICIAN ASSISTANT

## 2022-11-07 RX ORDER — GUAIFENESIN AND DEXTROMETHORPHAN HYDROBROMIDE 600; 30 MG/1; MG/1
1 TABLET, EXTENDED RELEASE ORAL 2 TIMES DAILY
COMMUNITY
End: 2022-11-21

## 2022-11-07 RX ORDER — ALBUTEROL SULFATE 2.5 MG/3ML
2.5 SOLUTION RESPIRATORY (INHALATION) ONCE
Status: COMPLETED | OUTPATIENT
Start: 2022-11-07 | End: 2022-11-07

## 2022-11-07 RX ORDER — ALBUTEROL SULFATE 90 UG/1
2 AEROSOL, METERED RESPIRATORY (INHALATION) EVERY 6 HOURS PRN
Qty: 18 G | Refills: 0 | Status: SHIPPED | OUTPATIENT
Start: 2022-11-07 | End: 2022-11-21 | Stop reason: SINTOL

## 2022-11-07 RX ADMIN — ALBUTEROL SULFATE 2.5 MG: 2.5 SOLUTION RESPIRATORY (INHALATION) at 16:49

## 2022-11-07 ASSESSMENT — ENCOUNTER SYMPTOMS
SHORTNESS OF BREATH: 1
EYE DISCHARGE: 1
SINUS PRESSURE: 0
COUGH: 1
SORE THROAT: 0
RHINORRHEA: 0

## 2022-11-07 NOTE — PROGRESS NOTES
Kate Lima (:  1950) is a 74 y.o. female,Established patient, here for evaluation of the following chief complaint(s):  Follow-up (Bronchitis - pt reports cough with chest congestion and fatigue. )         ASSESSMENT/PLAN:  1. Acute bronchitis, unspecified organism  -     albuterol (PROVENTIL) nebulizer solution 2.5 mg; 2.5 mg, Nebulization, ONCE, 1 dose, On Mon 22 at 1545Initiate RT Bronchodilator Protocol: No  -     58124 - NM INHAL RX, AIRWAY OBST/DX SPUTUM INDUCT  -     albuterol sulfate HFA (PROVENTIL HFA) 108 (90 Base) MCG/ACT inhaler; Inhale 2 puffs into the lungs every 6 hours as needed for Wheezing, Disp-18 g, R-0Normal      Patient Instructions   Start Proventil HFA 2 puffs 4 times/day x 1 week then gradually reduce frequency as cough will allow  Complete antibiotic as prescribed  Remain on Mucinex DM 12 Hour twice daily  Stay well hydrated with plenty of water  Continue chronic medications as prescribed      Return in about 2 weeks (around 2022) for previously scheduled appt. Subjective   SUBJECTIVE/OBJECTIVE:  HPI  Patient is here for follow-up of URI. The current state of this condition is no significant medication side effects noted and poorly controlled on Mucinex DM 12 Hour bid + Keflex 500 mg tid (on day #7/10) - taking as prescribed. She describes deep nonproductive cough that is worse when supine, hyperhidrosis with minimal activity, & easily fatigues.  was sick first but is now feeling better. They both had COVID in early September. She received flu vaccine on 10/18/22. Previous work-up was negative for COVID & influenza. Review of Systems   Constitutional:  Positive for diaphoresis (with minimal activity) and fatigue. Negative for chills and fever. HENT:  Positive for congestion. Negative for ear pain, postnasal drip, rhinorrhea, sinus pressure, sneezing and sore throat. Eyes:  Positive for discharge (watery).    Respiratory: Positive for cough (nonproductive) and shortness of breath (during coughing spells). Cardiovascular:  Positive for chest pain (during coughing spells). Musculoskeletal:  Positive for myalgias. Neurological:  Positive for headaches (after coughing spells). Negative for dizziness. /70 (Site: Left Upper Arm, Position: Sitting, Cuff Size: Small Adult)   Pulse 80   Temp 98.1 °F (36.7 °C) (Temporal)   Ht 5' 4\" (1.626 m)   Wt 134 lb (60.8 kg)   SpO2 98%   BMI 23.00 kg/m²       Objective   Physical Exam  Constitutional:       Appearance: Normal appearance. HENT:      Head: Normocephalic and atraumatic. Right Ear: Tympanic membrane, ear canal and external ear normal.      Left Ear: Tympanic membrane, ear canal and external ear normal.      Nose: Nose normal.      Right Turbinates: Not swollen. Left Turbinates: Not swollen. Right Sinus: No maxillary sinus tenderness or frontal sinus tenderness. Left Sinus: No maxillary sinus tenderness or frontal sinus tenderness. Mouth/Throat:      Mouth: Mucous membranes are moist.      Pharynx: Oropharynx is clear. Eyes:      Extraocular Movements: Extraocular movements intact. Conjunctiva/sclera: Conjunctivae normal.      Pupils: Pupils are equal, round, and reactive to light. Neck:      Vascular: No carotid bruit. Cardiovascular:      Rate and Rhythm: Normal rate and regular rhythm. Heart sounds: Normal heart sounds. Pulmonary:      Breath sounds: Rhonchi (diffuesly) present. Musculoskeletal:         General: Normal range of motion. Cervical back: Normal range of motion. Lymphadenopathy:      Head:      Right side of head: No submandibular or tonsillar adenopathy. Left side of head: No submandibular or tonsillar adenopathy. Skin:     General: Skin is warm and dry. Neurological:      Mental Status: She is alert and oriented to person, place, and time.    Psychiatric:         Mood and Affect: Mood normal.         Behavior: Behavior normal.         Thought Content: Thought content normal.         Judgment: Judgment normal.            An electronic signature was used to authenticate this note.     --Brenden Rivera PA-C

## 2022-11-07 NOTE — PATIENT INSTRUCTIONS
Start Proventil HFA 2 puffs 4 times/day x 1 week then gradually reduce frequency as cough will allow  Complete antibiotic as prescribed  Remain on Mucinex DM 12 Hour twice daily  Stay well hydrated with plenty of water  Continue chronic medications as prescribed

## 2022-11-14 DIAGNOSIS — D69.6 THROMBOCYTOPENIA (HCC): ICD-10-CM

## 2022-11-14 DIAGNOSIS — E03.9 ACQUIRED HYPOTHYROIDISM: ICD-10-CM

## 2022-11-14 DIAGNOSIS — R73.01 ELEVATED FASTING GLUCOSE: ICD-10-CM

## 2022-11-14 DIAGNOSIS — E78.2 MIXED HYPERLIPIDEMIA: ICD-10-CM

## 2022-11-14 DIAGNOSIS — N18.31 STAGE 3A CHRONIC KIDNEY DISEASE (HCC): ICD-10-CM

## 2022-11-14 LAB
ALBUMIN SERPL-MCNC: 3.8 G/DL (ref 3.2–4.6)
ALBUMIN/GLOB SERPL: 1.5 {RATIO} (ref 0.4–1.6)
ALP SERPL-CCNC: 67 U/L (ref 50–136)
ALT SERPL-CCNC: 28 U/L (ref 12–65)
ANION GAP SERPL CALC-SCNC: 5 MMOL/L (ref 2–11)
AST SERPL-CCNC: 17 U/L (ref 15–37)
BASOPHILS # BLD: 0.1 K/UL (ref 0–0.2)
BASOPHILS NFR BLD: 1 % (ref 0–2)
BILIRUB SERPL-MCNC: 0.8 MG/DL (ref 0.2–1.1)
BUN SERPL-MCNC: 15 MG/DL (ref 8–23)
CALCIUM SERPL-MCNC: 8.9 MG/DL (ref 8.3–10.4)
CHLORIDE SERPL-SCNC: 111 MMOL/L (ref 101–110)
CHOLEST SERPL-MCNC: 207 MG/DL
CO2 SERPL-SCNC: 28 MMOL/L (ref 21–32)
CREAT SERPL-MCNC: 1.1 MG/DL (ref 0.6–1)
DIFFERENTIAL METHOD BLD: ABNORMAL
EOSINOPHIL # BLD: 0.1 K/UL (ref 0–0.8)
EOSINOPHIL NFR BLD: 1 % (ref 0.5–7.8)
ERYTHROCYTE [DISTWIDTH] IN BLOOD BY AUTOMATED COUNT: 14.2 % (ref 11.9–14.6)
FERRITIN SERPL-MCNC: 77 NG/ML (ref 8–388)
GLOBULIN SER CALC-MCNC: 2.5 G/DL (ref 2.8–4.5)
GLUCOSE SERPL-MCNC: 101 MG/DL (ref 65–100)
HCT VFR BLD AUTO: 36.9 % (ref 35.8–46.3)
HDLC SERPL-MCNC: 35 MG/DL (ref 40–60)
HDLC SERPL: 5.9 {RATIO}
HGB BLD-MCNC: 13 G/DL (ref 11.7–15.4)
IMM GRANULOCYTES # BLD AUTO: 0 K/UL (ref 0–0.5)
IMM GRANULOCYTES NFR BLD AUTO: 0 % (ref 0–5)
IRON SATN MFR SERPL: 38 %
IRON SERPL-MCNC: 120 UG/DL (ref 35–150)
LDLC SERPL CALC-MCNC: 107.4 MG/DL
LYMPHOCYTES # BLD: 1.9 K/UL (ref 0.5–4.6)
LYMPHOCYTES NFR BLD: 34 % (ref 13–44)
MCH RBC QN AUTO: 35.5 PG (ref 26.1–32.9)
MCHC RBC AUTO-ENTMCNC: 35.2 G/DL (ref 31.4–35)
MCV RBC AUTO: 100.8 FL (ref 82–102)
MONOCYTES # BLD: 0.5 K/UL (ref 0.1–1.3)
MONOCYTES NFR BLD: 10 % (ref 4–12)
NEUTS SEG # BLD: 3 K/UL (ref 1.7–8.2)
NEUTS SEG NFR BLD: 54 % (ref 43–78)
NRBC # BLD: 0 K/UL (ref 0–0.2)
PLATELET # BLD AUTO: 181 K/UL (ref 150–450)
PMV BLD AUTO: 11.8 FL (ref 9.4–12.3)
POTASSIUM SERPL-SCNC: 4.4 MMOL/L (ref 3.5–5.1)
PROT SERPL-MCNC: 6.3 G/DL (ref 6.3–8.2)
RBC # BLD AUTO: 3.66 M/UL (ref 4.05–5.2)
SODIUM SERPL-SCNC: 144 MMOL/L (ref 133–143)
TIBC SERPL-MCNC: 320 UG/DL (ref 250–450)
TRIGL SERPL-MCNC: 323 MG/DL (ref 35–150)
TSH, 3RD GENERATION: 1.56 UIU/ML (ref 0.36–3.74)
VLDLC SERPL CALC-MCNC: 64.6 MG/DL (ref 6–23)
WBC # BLD AUTO: 5.7 K/UL (ref 4.3–11.1)

## 2022-11-15 LAB
EST. AVERAGE GLUCOSE BLD GHB EST-MCNC: 105 MG/DL
HBA1C MFR BLD: 5.3 % (ref 4.8–5.6)

## 2022-11-21 ENCOUNTER — OFFICE VISIT (OUTPATIENT)
Dept: INTERNAL MEDICINE CLINIC | Facility: CLINIC | Age: 72
End: 2022-11-21
Payer: MEDICARE

## 2022-11-21 VITALS
BODY MASS INDEX: 23.22 KG/M2 | HEART RATE: 72 BPM | TEMPERATURE: 98.1 F | SYSTOLIC BLOOD PRESSURE: 115 MMHG | OXYGEN SATURATION: 98 % | WEIGHT: 136 LBS | DIASTOLIC BLOOD PRESSURE: 70 MMHG | HEIGHT: 64 IN

## 2022-11-21 DIAGNOSIS — F33.1 MODERATE RECURRENT MAJOR DEPRESSION (HCC): ICD-10-CM

## 2022-11-21 DIAGNOSIS — E55.9 VITAMIN D DEFICIENCY: Primary | ICD-10-CM

## 2022-11-21 DIAGNOSIS — M79.604 PAIN IN BOTH LOWER EXTREMITIES: ICD-10-CM

## 2022-11-21 DIAGNOSIS — D69.6 THROMBOCYTOPENIA (HCC): ICD-10-CM

## 2022-11-21 DIAGNOSIS — F43.9 SITUATIONAL STRESS: ICD-10-CM

## 2022-11-21 DIAGNOSIS — M85.852 OSTEOPENIA OF BOTH HIPS: ICD-10-CM

## 2022-11-21 DIAGNOSIS — R05.8 POST-VIRAL COUGH SYNDROME: ICD-10-CM

## 2022-11-21 DIAGNOSIS — E03.9 ACQUIRED HYPOTHYROIDISM: ICD-10-CM

## 2022-11-21 DIAGNOSIS — K21.9 GASTROESOPHAGEAL REFLUX DISEASE WITHOUT ESOPHAGITIS: ICD-10-CM

## 2022-11-21 DIAGNOSIS — M85.851 OSTEOPENIA OF BOTH HIPS: ICD-10-CM

## 2022-11-21 DIAGNOSIS — E78.2 MIXED HYPERLIPIDEMIA: Primary | ICD-10-CM

## 2022-11-21 DIAGNOSIS — N18.31 STAGE 3A CHRONIC KIDNEY DISEASE (HCC): ICD-10-CM

## 2022-11-21 DIAGNOSIS — M79.605 PAIN IN BOTH LOWER EXTREMITIES: ICD-10-CM

## 2022-11-21 DIAGNOSIS — E87.8 ELECTROLYTE ABNORMALITY: ICD-10-CM

## 2022-11-21 DIAGNOSIS — R73.01 ELEVATED FASTING GLUCOSE: ICD-10-CM

## 2022-11-21 PROCEDURE — 1090F PRES/ABSN URINE INCON ASSESS: CPT | Performed by: PHYSICIAN ASSISTANT

## 2022-11-21 PROCEDURE — G8484 FLU IMMUNIZE NO ADMIN: HCPCS | Performed by: PHYSICIAN ASSISTANT

## 2022-11-21 PROCEDURE — 1123F ACP DISCUSS/DSCN MKR DOCD: CPT | Performed by: PHYSICIAN ASSISTANT

## 2022-11-21 PROCEDURE — 3017F COLORECTAL CA SCREEN DOC REV: CPT | Performed by: PHYSICIAN ASSISTANT

## 2022-11-21 PROCEDURE — G8420 CALC BMI NORM PARAMETERS: HCPCS | Performed by: PHYSICIAN ASSISTANT

## 2022-11-21 PROCEDURE — 1036F TOBACCO NON-USER: CPT | Performed by: PHYSICIAN ASSISTANT

## 2022-11-21 PROCEDURE — G8399 PT W/DXA RESULTS DOCUMENT: HCPCS | Performed by: PHYSICIAN ASSISTANT

## 2022-11-21 PROCEDURE — 99214 OFFICE O/P EST MOD 30 MIN: CPT | Performed by: PHYSICIAN ASSISTANT

## 2022-11-21 PROCEDURE — G8427 DOCREV CUR MEDS BY ELIG CLIN: HCPCS | Performed by: PHYSICIAN ASSISTANT

## 2022-11-21 RX ORDER — ALBUTEROL SULFATE 90 UG/1
4 AEROSOL, METERED RESPIRATORY (INHALATION) PRN
OUTPATIENT
Start: 2022-11-22

## 2022-11-21 RX ORDER — SODIUM CHLORIDE 9 MG/ML
INJECTION, SOLUTION INTRAVENOUS CONTINUOUS
OUTPATIENT
Start: 2022-11-22

## 2022-11-21 RX ORDER — FAMOTIDINE 10 MG/ML
20 INJECTION, SOLUTION INTRAVENOUS
OUTPATIENT
Start: 2022-11-22

## 2022-11-21 RX ORDER — EPINEPHRINE 1 MG/ML
0.3 INJECTION, SOLUTION, CONCENTRATE INTRAVENOUS PRN
OUTPATIENT
Start: 2022-11-22

## 2022-11-21 RX ORDER — LEVOMEFOLATE/ALGAL OIL 15-90.314
15 CAPSULE ORAL DAILY
Qty: 90 CAPSULE | Refills: 3 | Status: SHIPPED | OUTPATIENT
Start: 2022-11-21

## 2022-11-21 RX ORDER — FAMOTIDINE 40 MG/1
40 TABLET, FILM COATED ORAL 2 TIMES DAILY
Qty: 180 TABLET | Refills: 3 | Status: SHIPPED | OUTPATIENT
Start: 2022-11-21

## 2022-11-21 RX ORDER — ACETAMINOPHEN 325 MG/1
650 TABLET ORAL
OUTPATIENT
Start: 2022-11-22

## 2022-11-21 RX ORDER — ONDANSETRON 2 MG/ML
8 INJECTION INTRAMUSCULAR; INTRAVENOUS
OUTPATIENT
Start: 2022-11-22

## 2022-11-21 RX ORDER — DENOSUMAB 60 MG/ML
60 INJECTION SUBCUTANEOUS
Qty: 180 ML | Refills: 1
Start: 2022-11-21

## 2022-11-21 RX ORDER — DIPHENHYDRAMINE HYDROCHLORIDE 50 MG/ML
50 INJECTION INTRAMUSCULAR; INTRAVENOUS
OUTPATIENT
Start: 2022-11-22

## 2022-11-21 ASSESSMENT — ENCOUNTER SYMPTOMS
SHORTNESS OF BREATH: 0
NAUSEA: 0
ABDOMINAL PAIN: 0
ABDOMINAL DISTENTION: 1
DIARRHEA: 0
VOMITING: 0
BLOOD IN STOOL: 0
CONSTIPATION: 0

## 2022-11-21 ASSESSMENT — PATIENT HEALTH QUESTIONNAIRE - PHQ9
4. FEELING TIRED OR HAVING LITTLE ENERGY: 2
7. TROUBLE CONCENTRATING ON THINGS, SUCH AS READING THE NEWSPAPER OR WATCHING TELEVISION: 1
8. MOVING OR SPEAKING SO SLOWLY THAT OTHER PEOPLE COULD HAVE NOTICED. OR THE OPPOSITE, BEING SO FIGETY OR RESTLESS THAT YOU HAVE BEEN MOVING AROUND A LOT MORE THAN USUAL: 0
SUM OF ALL RESPONSES TO PHQ QUESTIONS 1-9: 8
SUM OF ALL RESPONSES TO PHQ9 QUESTIONS 1 & 2: 2
3. TROUBLE FALLING OR STAYING ASLEEP: 0
5. POOR APPETITE OR OVEREATING: 2
2. FEELING DOWN, DEPRESSED OR HOPELESS: 1
6. FEELING BAD ABOUT YOURSELF - OR THAT YOU ARE A FAILURE OR HAVE LET YOURSELF OR YOUR FAMILY DOWN: 1
SUM OF ALL RESPONSES TO PHQ QUESTIONS 1-9: 8
10. IF YOU CHECKED OFF ANY PROBLEMS, HOW DIFFICULT HAVE THESE PROBLEMS MADE IT FOR YOU TO DO YOUR WORK, TAKE CARE OF THINGS AT HOME, OR GET ALONG WITH OTHER PEOPLE: 1
9. THOUGHTS THAT YOU WOULD BE BETTER OFF DEAD, OR OF HURTING YOURSELF: 0
SUM OF ALL RESPONSES TO PHQ QUESTIONS 1-9: 8
1. LITTLE INTEREST OR PLEASURE IN DOING THINGS: 1
SUM OF ALL RESPONSES TO PHQ QUESTIONS 1-9: 8

## 2022-11-21 NOTE — PATIENT INSTRUCTIONS
Resume OTC MegaRed supplement daily to aid in triglyceride lowering  Start Flonase Sensimist 2 sprays/nostril once daily in the evening x 1-2 weeks  Instructed on how to use nasal spray properly for optimal results - insert tip into nostril, angle slightly outward, then squirt according to directions  Stay very well hydrated with plenty of water to keep sinus drainage/phlegm thinned out  Counseled to specifically work on reducing intake of sweets & carbohydrates to get triglycerides back in reasonable range  Encouraged to keep working towards resuming a regular exercise routine again as she regains strength and endurance post several illnesses this fall  Asked to notify me if lower extremity pain is better or worse after next Prolia injection which is due tomorrow  Will plan on refilling Clonazepam when it comes due if current supply + remaining refill do not last until next scheduled appt  The [place] database was verified to have no suspicious activity before the issuance of a controlled substance prescription  Suggest that she deactivate automatic refill on Viibryd 10 mg at pharmacy

## 2022-11-21 NOTE — PROGRESS NOTES
[id number] called requesting a new Prolia order for pt, please. She is scheduled for her injection tomorrow morning at 10:00. Therapy plan pended for signature.

## 2022-11-21 NOTE — PROGRESS NOTES
Wilberto Baer (:  1950) is a 74 y.o. female,Established patient, here for evaluation of the following chief complaint(s):  Follow-up (Hypothyroidism, Hyperlipidemia, Elevated Glucose, Depression)         ASSESSMENT/PLAN:  1. Mixed hyperlipidemia  -     Comprehensive Metabolic Panel; Future  -     Lipid Panel; Future  2. Elevated fasting glucose  -     Comprehensive Metabolic Panel; Future  -     Hemoglobin A1C; Future  3. Gastroesophageal reflux disease without esophagitis  -     famotidine (PEPCID) 40 MG tablet; Take 1 tablet by mouth 2 times daily, Disp-180 tablet, R-3Normal  4. Thrombocytopenia (HCC)  -     CBC with Auto Differential; Future  5. Stage 3a chronic kidney disease ([facility] [address]  -     Comprehensive Metabolic Panel; Future  6. Acquired hypothyroidism  -     TSH; Future  7. Moderate recurrent major depression ([facility]  -     L-Methylfolate-Algae (DEPLIN 15) 15-90.314 MG CAPS; Take 15 mg by mouth daily, Disp-90 capsule, R-3Normal  8. Situational stress  9. Post-viral cough syndrome  10. Pain in both lower extremities  11. Osteopenia of both hips  -     denosumab (PROLIA) 60 MG/ML SOSY SC injection; Inject 1 mL into the skin every 6 months, Disp-180 mL, R-1NO PRINT  12. Electrolyte abnormality  -     Comprehensive Metabolic Panel;  Future      Patient Instructions   Resume OTC MegaRed supplement daily to aid in triglyceride lowering  Start Flonase Sensimist 2 sprays/nostril once daily in the evening x 1-2 weeks  Instructed on how to use nasal spray properly for optimal results - insert tip into nostril, angle slightly outward, then squirt according to directions  Stay very well hydrated with plenty of water to keep sinus drainage/phlegm thinned out  Counseled to specifically work on reducing intake of sweets & carbohydrates to get triglycerides back in reasonable range  Encouraged to keep working towards resuming a regular exercise routine again as she regains strength and endurance post several fasting glucose. The current state of this condition is asymptomatic and improved - not currently on medications for this problem. Hemoglobin A1C   Date Value Ref Range Status   11/14/2022 5.3 4.8 - 5.6 % Final       Patient is here for follow-up of GERD. The current state of this condition is no significant medication side effects noted and well controlled on Famotidine 40 mg bid - taking as prescribed, adverse effects of retrial on Nexium: nausea, diarrhea. Previous work-up includes endoscopy Jan 2017 with findings of hiatal hernia with duodenal biopsies normal.      The patient is a 74 y.o. female who is seen for follow up of hypothyroidism. Current symptoms: none. Symptoms are basically asymptomatic. The patient is following treatment regimen with good compliance. Current treatment regimen consists of Synthroid 50 mcg daily and is  taking it first thing in the AM on an empty stomach with no other food/liquids/other medications for at least 45-60 minutes. Lab Results   Component Value Date    KYJ0EQE 1.560 11/14/2022    GDH3IRP 1.510 07/12/2022    TSH 4.570 (H) 04/08/2022    TSH 3.170 01/04/2022    TSH 2.600 09/09/2021     No results found for: T4FREE  No results found for: TGAB      Patient is here for follow-up of chronic kidney disease. The current state of this condition is asymptomatic and stable - not currently on medications for this problem. Lab Results   Component Value Date    CREATININE 1.10 (H) 11/14/2022    BUN 15 11/14/2022     (H) 11/14/2022    K 4.4 11/14/2022     (H) 11/14/2022    CO2 28 11/14/2022       Patient is here for follow-up of mild thrombocytopenia. The current state of this condition is asymptomatic and improved - not currently on medications for this problem.     Lab Results   Component Value Date    WBC 5.7 11/14/2022    WBC 5.8 07/12/2022    WBC 5.9 04/08/2022     Lab Results   Component Value Date    HGB 13.0 11/14/2022    HGB 13.3 07/12/2022    HGB 13.3 04/08/2022     Lab Results   Component Value Date    HCT 36.9 11/14/2022    HCT 39.8 07/12/2022    HCT 38.2 04/08/2022     Lab Results   Component Value Date    .8 11/14/2022    MCV 98.0 (H) 07/12/2022    MCV 97 04/08/2022     Lab Results   Component Value Date     11/14/2022     (L) 07/12/2022     (L) 04/08/2022       Lab Results   Component Value Date    IRON 120 11/14/2022     Lab Results   Component Value Date    TIBC 320 11/14/2022     Lab Results   Component Value Date    TRFS 38 11/14/2022     Lab Results   Component Value Date    FERRITIN 77 11/14/2022     Lab Results   Component Value Date    DCUCDTLH56 807 09/09/2021     Lab Results   Component Value Date    FOLATE >20.0 09/09/2021       Patient is here for follow-up of major recurrent depression with situational anxiety. The current state of this condition is no significant medication side effects noted and reasonably well controlled on Viibryd 20 mg daily + Deplin 15 mg daily +/- Clonazepam 0.5-1 mg daily prn (reports more frequent use during holidays when she is saddened by the fact that she isn't allowed to see grandchildren) - taking as prescribed, adverse effects: hyperhidrosis. Current prescription was filled 11/1/22 for #75 with 1 refill before 3/21/23. Patient is here for follow-up of osteopenia. The current state of this condition is no significant medication side effects noted and stable on Prolia q 6 months - taking as prescribed. DEXA Result (most recent):  DEXA BONE DENSITY AXIAL SKELETON 04/28/2022    Narrative  BONE MINERAL DENSITY    INDICATION:  Postmenopausal    COMPARISON: 09/18/2019    TECHNIQUE: Imaging was performed on a [employer]. World Health  Organization meta-analysis fracture risk calculator (FRAX) analysis was  performed for 10 year fracture risk probability assessment.     FINDINGS:    Lumbar Spine:  Bone mineral density:  1.178 g/cm2  T-score:  -0.1  Z-score:  1.8    Femoral Neck Left:  Bone mineral density:  0.905 g/cm2  T-score:  -1.0  Z-score:  1.0    Femoral Neck Right:  Bone mineral density:  0.849 g/cm2  T-score:  -1.4  Z-score:  0.6    Total Hip Left:  Bone mineral density:  0.934 g/cm2  T-score:  -0.6  Z-score:  1.2    Total Hip Right:  Bone mineral density:  0.878 g/cm2  T-score:  -1.0  Z-score:  0.8    Impression  Using the World Health Organization criteria, the bone mineral density is low,  without statistically significant change. 10 year probability of major osteoporotic fracture:  10.0%  10 year probability of hip fracture:  1.8%        Additional concerns addressed today include persistent chest congestion. She used albuterol inhaler x 1 week but had to quit because it was making her \"nervous\". She switched from Mucinex DM to Tussin liquid (up to 3 times/day) which has been more helpful at breaking up the congestion. She also complains of bilateral lower extremity \"bone pain\" described as aching in hips & upper legs primarily x 1 month. Aggravating factors include prolonged sitting or sleeping (side or supine) positions. Review of Systems   Constitutional:  Positive for diaphoresis and fatigue. Negative for unexpected weight change. Eyes:  Positive for visual disturbance (close up vision). Respiratory:  Negative for shortness of breath. Cardiovascular:  Positive for chest pain (coughing) and palpitations. Negative for leg swelling. Gastrointestinal:  Positive for abdominal distention (occasional - associated with constipation). Negative for abdominal pain, blood in stool, constipation, diarrhea, nausea and vomiting. Positive for heartburn - several times/week   Endocrine: Negative for cold intolerance, heat intolerance and polydipsia. Negative for hair loss   Genitourinary:  Negative for frequency. Musculoskeletal:  Negative for myalgias. Neurological:  Negative for dizziness, numbness and headaches.         /70 (Site: Left Upper Arm, Position: Sitting, Cuff Size: Large Adult)   Pulse 72   Temp 98.1 °F (36.7 °C) (Temporal)   Ht 5' 4\" (1.626 m)   Wt 136 lb (61.7 kg)   SpO2 98%   BMI 23.34 kg/m²       Objective   Physical Exam  Constitutional:       Appearance: Normal appearance. HENT:      Head: Normocephalic and atraumatic. Right Ear: Tympanic membrane, ear canal and external ear normal.      Left Ear: Tympanic membrane, ear canal and external ear normal.      Nose:      Right Turbinates: Swollen. Left Turbinates: Swollen. Right Sinus: No maxillary sinus tenderness or frontal sinus tenderness. Left Sinus: No maxillary sinus tenderness or frontal sinus tenderness. Mouth/Throat:      Mouth: Mucous membranes are moist.      Comments: Posterior pharynx w/ cobblestone appearance  Eyes:      Conjunctiva/sclera: Conjunctivae normal.      Pupils: Pupils are equal, round, and reactive to light. Neck:      Vascular: No carotid bruit. Cardiovascular:      Rate and Rhythm: Normal rate and regular rhythm. Heart sounds: Normal heart sounds. Pulmonary:      Effort: Pulmonary effort is normal.      Breath sounds: Normal breath sounds. Musculoskeletal:         General: Normal range of motion. Cervical back: Normal range of motion. Lymphadenopathy:      Head:      Right side of head: No submandibular or tonsillar adenopathy. Left side of head: No submandibular or tonsillar adenopathy. Skin:     General: Skin is warm and dry. Neurological:      Mental Status: She is alert and oriented to person, place, and time. Psychiatric:         Mood and Affect: Mood normal.         Behavior: Behavior normal.         Thought Content:  Thought content normal.         Judgment: Judgment normal.          On this date 11/21/2022 I have spent 35 minutes reviewing previous notes, test results and face to face with the patient discussing the diagnosis and importance of compliance with the treatment plan as well as documenting on the day of the visit. An electronic signature was used to authenticate this note.     --Navi Klein PA-C

## 2022-11-22 ENCOUNTER — HOSPITAL ENCOUNTER (OUTPATIENT)
Dept: LAB | Age: 72
Discharge: HOME | End: 2022-11-25

## 2022-11-22 ENCOUNTER — HOSPITAL ENCOUNTER (OUTPATIENT)
Dept: INFUSION THERAPY | Age: 72
Discharge: HOME | End: 2022-11-22
Payer: MEDICARE

## 2022-11-22 VITALS
OXYGEN SATURATION: 95 % | SYSTOLIC BLOOD PRESSURE: 104 MMHG | DIASTOLIC BLOOD PRESSURE: 62 MMHG | TEMPERATURE: 98 F | RESPIRATION RATE: 16 BRPM | HEART RATE: 73 BPM

## 2022-11-22 DIAGNOSIS — M85.851 OSTEOPENIA OF BOTH HIPS: Primary | ICD-10-CM

## 2022-11-22 DIAGNOSIS — M85.852 OSTEOPENIA OF BOTH HIPS: Primary | ICD-10-CM

## 2022-11-22 LAB
CALCIUM SERPL-MCNC: 9.2 MG/DL (ref 8.3–10.4)
MAGNESIUM SERPL-MCNC: 2.2 MG/DL (ref 1.8–2.4)
PHOSPHATE SERPL-MCNC: 3.3 MG/DL (ref 2.3–3.7)

## 2022-11-22 PROCEDURE — 82310 ASSAY OF CALCIUM: CPT

## 2022-11-22 PROCEDURE — 96372 THER/PROPH/DIAG INJ SC/IM: CPT

## 2022-11-22 PROCEDURE — 6360000002 HC RX W HCPCS: Performed by: PHYSICIAN ASSISTANT

## 2022-11-22 PROCEDURE — 36415 COLL VENOUS BLD VENIPUNCTURE: CPT

## 2022-11-22 PROCEDURE — 84100 ASSAY OF PHOSPHORUS: CPT

## 2022-11-22 PROCEDURE — 83735 ASSAY OF MAGNESIUM: CPT

## 2022-11-22 RX ORDER — SODIUM CHLORIDE 9 MG/ML
INJECTION, SOLUTION INTRAVENOUS CONTINUOUS
OUTPATIENT
Start: 2023-05-23

## 2022-11-22 RX ORDER — ALBUTEROL SULFATE 90 UG/1
4 AEROSOL, METERED RESPIRATORY (INHALATION) PRN
OUTPATIENT
Start: 2023-05-23

## 2022-11-22 RX ORDER — EPINEPHRINE 1 MG/ML
0.3 INJECTION, SOLUTION, CONCENTRATE INTRAVENOUS PRN
OUTPATIENT
Start: 2023-05-23

## 2022-11-22 RX ORDER — ONDANSETRON 2 MG/ML
8 INJECTION INTRAMUSCULAR; INTRAVENOUS
OUTPATIENT
Start: 2023-05-23

## 2022-11-22 RX ORDER — ACETAMINOPHEN 325 MG/1
650 TABLET ORAL
OUTPATIENT
Start: 2023-05-23

## 2022-11-22 RX ORDER — DIPHENHYDRAMINE HYDROCHLORIDE 50 MG/ML
50 INJECTION INTRAMUSCULAR; INTRAVENOUS
OUTPATIENT
Start: 2023-05-23

## 2022-11-22 RX ADMIN — DENOSUMAB 60 MG: 60 INJECTION SUBCUTANEOUS at 11:28

## 2022-11-28 ENCOUNTER — TELEPHONE (OUTPATIENT)
Dept: INTERNAL MEDICINE CLINIC | Facility: CLINIC | Age: 72
End: 2022-11-28

## 2022-11-28 DIAGNOSIS — R30.0 BURNING WITH URINATION: Primary | ICD-10-CM

## 2022-11-28 NOTE — TELEPHONE ENCOUNTER
Burning with urination is pt's only current sx. Added to order and signed. Pt will come in tomorrow morning to leave UA specimen.

## 2022-11-28 NOTE — TELEPHONE ENCOUNTER
Pt states that she thinks her UTI is resurfacing due to the same sx's. She wanted to know if she could leave you another UA. Please advise.

## 2022-11-28 NOTE — TELEPHONE ENCOUNTER
Yes, I am ok with that since we have so recently discussed and treated. Please collect specific symptoms she is feeling and have her come by and leave a urine sample. Order will be placed but diagnosis needs to be added based on specific symptoms she's experiencing.

## 2022-11-29 ENCOUNTER — NURSE ONLY (OUTPATIENT)
Dept: INTERNAL MEDICINE CLINIC | Facility: CLINIC | Age: 72
End: 2022-11-29
Payer: MEDICARE

## 2022-11-29 DIAGNOSIS — R30.0 BURNING WITH URINATION: Primary | ICD-10-CM

## 2022-11-29 LAB
BILIRUBIN, URINE, POC: NEGATIVE
BLOOD URINE, POC: NEGATIVE
GLUCOSE URINE, POC: NEGATIVE
KETONES, URINE, POC: NEGATIVE
LEUKOCYTE ESTERASE, URINE, POC: NEGATIVE
NITRITE, URINE, POC: POSITIVE
PH, URINE, POC: 6 (ref 4.6–8)
PROTEIN,URINE, POC: NEGATIVE
SPECIFIC GRAVITY, URINE, POC: 1.02 (ref 1–1.03)
URINALYSIS CLARITY, POC: CLEAR
URINALYSIS COLOR, POC: YELLOW
UROBILINOGEN, POC: 0.2

## 2022-11-29 PROCEDURE — 81003 URINALYSIS AUTO W/O SCOPE: CPT | Performed by: PHYSICIAN ASSISTANT

## 2022-11-29 PROCEDURE — 99211 OFF/OP EST MAY X REQ PHY/QHP: CPT | Performed by: PHYSICIAN ASSISTANT

## 2022-11-29 NOTE — RESULT ENCOUNTER NOTE
Urine does show some abnormalities but need to culture before we start more antibiotics so please try Cystex UTI Pain Relief w/ Bacteria Control to help relieve the symptoms until those results come back late this week.

## 2022-12-07 ENCOUNTER — NURSE ONLY (OUTPATIENT)
Dept: INTERNAL MEDICINE CLINIC | Facility: CLINIC | Age: 72
End: 2022-12-07
Payer: MEDICARE

## 2022-12-07 DIAGNOSIS — R30.0 BURNING WITH URINATION: ICD-10-CM

## 2022-12-07 DIAGNOSIS — R82.90 ABNORMAL FINDING ON URINALYSIS: Primary | ICD-10-CM

## 2022-12-07 DIAGNOSIS — R82.90 ABNORMAL FINDING ON URINALYSIS: ICD-10-CM

## 2022-12-07 PROCEDURE — 99211 OFF/OP EST MAY X REQ PHY/QHP: CPT | Performed by: PHYSICIAN ASSISTANT

## 2022-12-10 LAB
BACTERIA SPEC CULT: NORMAL
SERVICE CMNT-IMP: NORMAL

## 2022-12-12 NOTE — RESULT ENCOUNTER NOTE
Urine does not show any infection.   If symptoms are still present, it is likely the interstitial cystitis is back, we can refer her to a specialist.

## 2022-12-19 ENCOUNTER — TELEPHONE (OUTPATIENT)
Dept: INTERNAL MEDICINE CLINIC | Facility: CLINIC | Age: 72
End: 2022-12-19

## 2022-12-19 DIAGNOSIS — R30.0 DYSURIA: Primary | ICD-10-CM

## 2022-12-19 DIAGNOSIS — Z87.448 HISTORY OF NEUROGENIC BLADDER: ICD-10-CM

## 2022-12-19 NOTE — TELEPHONE ENCOUNTER
Pt states that she is still experiencing burning with urination and she would like to proceed with a referral to a specialist, please. She states that she has seen a Urologist previously on [address]  If you were going to refer her to a Urologist.

## 2023-01-09 ASSESSMENT — ENCOUNTER SYMPTOMS
SHORTNESS OF BREATH: 0
DIARRHEA: 0
PHOTOPHOBIA: 0
ABDOMINAL PAIN: 0
CONSTIPATION: 0
COUGH: 0
SORE THROAT: 0
ABDOMINAL DISTENTION: 0

## 2023-01-09 NOTE — PROGRESS NOTES
[facility] CARDIOLOGY  [address], [address]  PHONE: [phone]        NAME:  Cece Rashid  : 1950  MRN: [id number]     PCP:  Brenden Rivera PA-C      SUBJECTIVE:   Cece Rashid is a 74 y.o. female seen for a follow up visit regarding the following:     Chief Complaint   Patient presents with    Hyperlipidemia     HPI:    She presented recently to -[facility], last seen in the catheterization lab 2019 in the setting of recurrent substernal chest discomfort despite a negative nuclear stress test.    Cardiac cath 2019:  1. Minimal coronary disease as described above. 2.  Noncardiac chest symptoms most likely. 3.  Normal left ventricular regional wall motion and ejection fraction. 4.  Elevated left ventricular end-diastolic pressure. Under lots of situational stress recently with recurrent SSCP associated with severely stressful situations. Having recurrent CP, SOB, DAVIS, and acute diaphoresis with low level stress. Exam and ECG benign. Wonders if symptoms could be due to antidepressant but doesn't want to stop it at present. Echo 2022:  Left Ventricle Normal left ventricular systolic function with a visually estimated EF of 60 - 65%. Left ventricle size is normal. Normal wall thickness. Normal wall motion. Normal diastolic function. Left Atrium Left atrium size is normal. LA Vol Index is  27 ml/m2. Right Ventricle Right ventricle size is normal. Normal wall thickness. RV basal diameter is 3.0 cm. RV mid diameter is 2.1 cm. Normal systolic function. Right Atrium Right atrium size is normal.   Aortic Valve Valve structure is normal. No regurgitation. No stenosis. Mitral Valve Valve structure is normal. Trace regurgitation. No stenosis noted. Tricuspid Valve Valve structure is normal. Mild regurgitation. No stenosis noted. The estimated RVSP is 17 mmHg.    Pulmonic Valve Valve structure is normal. Trace regurgitation. No stenosis noted. Aorta Normal sized annulus, sinus of Valsalva, ascending aorta, aortic arch, descending aorta and abdominal aorta. Pericardium No pericardial effusion. Nuclear stress 6/2022:    Nuclear Findings: LVEF measures 68%. LV perfusion is normal.    Nuclear Findings: Normal left ventricular systolic function post-stress. LVEF measures 68%. Nuclear Findings: Normal treadmill myocardial perfusion study at 97 %% PHR. ECG: Resting ECG demonstrates normal sinus rhythm. Stress Test: A Ishan protocol stress test was performed. Still having lots of chest discomfort and GERD symptoms, likely stress-induced. Taking famotidine 40 mg twice daily but not really helping. Symptoms present at rest and worse with stress. No severe discomfort or dyspnea with exercise. Exam normal.  Vital signs stable. ECG unremarkable. Augmenting GI meds and she will call me with an update in a couple of weeks      Past Medical History, Past Surgical History, Family history, Social History, and Medications were all reviewed with the patient today and updated as necessary. Current Outpatient Medications   Medication Sig Dispense Refill    famotidine (PEPCID) 40 MG tablet Take 1 tablet by mouth 2 times daily 180 tablet 3    L-Methylfolate-Algae (DEPLIN 15) 15-90.314 MG CAPS Take 15 mg by mouth daily 90 capsule 3    denosumab (PROLIA) 60 MG/ML SOSY SC injection Inject 1 mL into the skin every 6 months 180 mL 1    tretinoin (RETIN-A) 0.025 % cream APPLY A THIN LAYER TO FACE DAILY *OUT OF POCKET EXPENSE, NO PA*      fluorouracil (EFUDEX) 5 % cream APPLY ON FACE, ARMS, AND CHEST X 2 WEEKS, STOP X 2 WEEKS, THEN REAPPLY AT BED X 2 WEEKS      clonazePAM (KLONOPIN) 1 MG tablet Take 1/2-1 tablet po q AM as needed for anxiety & 1.5 tabets po q hs for sleep. 75 tablet 2    SYNTHROID 50 MCG tablet Take 1 tablet by mouth every morning (before breakfast) Brand medically necessary.  90 tablet 3 VILAZODONE HCL 20 MG Tablet Take 1 tablet by mouth in the morning. 90 tablet 3    nitroGLYCERIN (NITROSTAT) 0.4 MG SL tablet Place 1 SL under the tongue q 5 min prn cp, max 3 SL in a 15-min time period. Call 911 if no relief after the 1st SL. 25 tablet 3    docusate (COLACE, DULCOLAX) 100 MG CAPS Take 100 mg by mouth in the morning. latanoprost (XALATAN) 0.005 % ophthalmic solution INSTILL 1 DROP IN BOTH EYES EVERY EVENING      aspirin 81 MG EC tablet Take 81 mg by mouth      Biotin 2.5 MG CAPS Take 1 tablet by mouth       Cetirizine HCl 10 MG CAPS Take 1 capsule by mouth      clobetasol (TEMOVATE) 0.05 % ointment APPLY TO AFFECTED AREAS TWICE DAILY WHEN FLARES      fluticasone (FLONASE) 50 MCG/ACT nasal spray 2 sprays by Nasal route daily      polyethylene glycol (GLYCOLAX) 17 GM/SCOOP powder Take 17 g by mouth daily       No current facility-administered medications for this visit.         Allergies   Allergen Reactions    Morphine Other (See Comments)     \"I almost checked out\"-Trendelenburg  \"I almost checked out\"-Trendelenburg    Nitrofurantoin Diarrhea     And Nausea    Oxycodone-Acetaminophen Diarrhea    Propranolol Other (See Comments)     \"Didn't sleep for 2 days and  I saw green splotches on 2 different days \"  \"Didn't sleep for 2 days and  I saw green splotches on 2 different days \"    Sulfa Antibiotics Other (See Comments)     As a child/ unknown reactions  As a child/ unknown reactions       Patient Active Problem List    Diagnosis Date Noted    Neurogenic bladder 09/09/2014     Priority: Medium    Osteoarthritis of left AC (acromioclavicular) joint 06/17/2021     Priority: Low    Left shoulder pain 06/17/2021     Priority: Low    Chest tightness 10/10/2018     Priority: Low    Shortness of breath 10/10/2018     Priority: Low    Hypothyroidism (acquired)      Priority: Low     Overview Note:     manage well with Synthroid        Moderate recurrent major depression ([facility] [address] 02/22/2016     Priority: Low Gastroesophageal reflux disease without esophagitis 02/22/2016     Priority: Low    Vitamin D deficiency 10/24/2014     Priority: Low    Situational stress 10/24/2014     Priority: Low    Osteopenia 10/24/2014     Priority: Low    Mixed hyperlipidemia 10/24/2014     Priority: Low        Past Surgical History:   Procedure Laterality Date    CARDIAC CATHETERIZATION  02/22/2019    CATARACT REMOVAL Bilateral 1/2022, 2/2022    COLONOSCOPY  4/2005 & 1/26/17    Tortuous Colon & Internal Hemorrhoids    COLONOSCOPY  07/29/2022    Hyperplastic Colon Polyp + Diverticulosis - No Further Recommended    MALIGNANT SKIN LESION EXCISION  10/2010    Hx of Basal Cell Carcinoma - L Chest & Nose    MALIGNANT SKIN LESION EXCISION  10/31/2018    R Lower Leg    ORTHOPEDIC SURGERY Right 8/27/15    Trigger Finger Release    ORTHOPEDIC SURGERY Right 9/8/15    Interphalangeal Resection Arthroplasty Foot    ORTHOPEDIC SURGERY Left 02/16/2017    Achilles Reconstruction    GILBERTO AND BSO (CERVIX REMOVED)  1994    UPPER GASTROINTESTINAL ENDOSCOPY  01/26/2017    Hiatal Hernia       Family History   Problem Relation Age of Onset    Stroke Mother     Hypertension Father     Elevated Lipids Mother     Heart Failure Father     Elevated Lipids Father     Diabetes Sister 54        Surgically Induced, Diabetic Coma    Breast Cancer Neg Hx     Heart Disease Father         CAD        Social History     Tobacco Use    Smoking status: Never    Smokeless tobacco: Never   Substance Use Topics    Alcohol use: Yes     Alcohol/week: 2.0 standard drinks       ROS:    Review of Systems   Constitutional:  Negative for appetite change, chills, diaphoresis and fatigue. HENT:  Negative for congestion, mouth sores, nosebleeds, sore throat and tinnitus. Eyes:  Negative for photophobia and visual disturbance. Respiratory:  Negative for cough and shortness of breath. Cardiovascular:  Negative for chest pain, palpitations and leg swelling.    Gastrointestinal: Negative for abdominal distention, abdominal pain, constipation and diarrhea. Endocrine: Negative for cold intolerance, heat intolerance, polydipsia and polyuria. Genitourinary:  Negative for dysuria and hematuria. Musculoskeletal:  Negative for arthralgias, joint swelling and myalgias. Skin:  Negative for rash. Allergic/Immunologic: Negative for environmental allergies and food allergies. Neurological:  Negative for dizziness, seizures, syncope and light-headedness. Hematological:  Negative for adenopathy. Does not bruise/bleed easily. Psychiatric/Behavioral:  Negative for agitation, behavioral problems, dysphoric mood and hallucinations. The patient is not nervous/anxious. Severe situational stress, anxiety      PHYSICAL EXAM:     Vitals:    01/13/23 1034   BP: 120/70   Pulse: 71   Weight: 136 lb (61.7 kg)   Height: 5' 4\" (1.626 m)      Wt Readings from Last 3 Encounters:   01/13/23 136 lb (61.7 kg)   11/21/22 136 lb (61.7 kg)   11/07/22 134 lb (60.8 kg)      BP Readings from Last 3 Encounters:   01/13/23 120/70   11/22/22 104/62   11/21/22 115/70        Physical Exam  Constitutional:       Appearance: Normal appearance. She is normal weight. HENT:      Head: Normocephalic and atraumatic. Nose: Nose normal.      Mouth/Throat:      Mouth: Mucous membranes are moist.      Pharynx: Oropharynx is clear. Eyes:      Extraocular Movements: Extraocular movements intact. Pupils: Pupils are equal, round, and reactive to light. Neck:      Vascular: No carotid bruit or JVD. Cardiovascular:      Rate and Rhythm: Normal rate and regular rhythm. Heart sounds: No murmur heard. No friction rub. No gallop. Pulmonary:      Effort: Pulmonary effort is normal.      Breath sounds: Normal breath sounds. No wheezing or rhonchi. Abdominal:      General: Abdomen is flat. Bowel sounds are normal. There is no distension. Palpations: Abdomen is soft. Tenderness:  There is no abdominal tenderness. Musculoskeletal:         General: No swelling. Normal range of motion. Cervical back: Normal range of motion and neck supple. No tenderness. Skin:     General: Skin is warm and dry. Neurological:      General: No focal deficit present. Mental Status: She is alert and oriented to person, place, and time. Mental status is at baseline. Psychiatric:         Mood and Affect: Mood normal.         Behavior: Behavior normal.        Medical problems and test results were reviewed with the patient today. Lab Results   Component Value Date    CHOL 207 (H) 11/14/2022    CHOL 201 (H) 07/12/2022    CHOL 206 (H) 04/08/2022     Lab Results   Component Value Date    TRIG 323 (H) 11/14/2022    TRIG 186 (H) 07/12/2022    TRIG 209 (H) 04/08/2022     Lab Results   Component Value Date    HDL 35 (L) 11/14/2022    HDL 46 07/12/2022    HDL 40 04/08/2022     Lab Results   Component Value Date    LDLCALC 107.4 (H) 11/14/2022    LDLCALC 117.8 (H) 07/12/2022    LDLCALC 129 (H) 04/08/2022     Lab Results   Component Value Date    LABVLDL 64.6 (H) 11/14/2022    LABVLDL 37.2 (H) 07/12/2022    LABVLDL 47 (H) 05/19/2020    VLDL 37 04/08/2022    VLDL 27 01/04/2022    VLDL 45 (H) 09/09/2021     Lab Results   Component Value Date    CHOLHDLRATIO 5.9 11/14/2022    CHOLHDLRATIO 4.4 07/12/2022       Lab Results   Component Value Date/Time     11/14/2022 10:08 AM    K 4.4 11/14/2022 10:08 AM     11/14/2022 10:08 AM    CO2 28 11/14/2022 10:08 AM    BUN 15 11/14/2022 10:08 AM    CREATININE 1.10 11/14/2022 10:08 AM    GLUCOSE 101 11/14/2022 10:08 AM    CALCIUM 9.2 11/22/2022 10:29 AM        No results for input(s): WBC, HGB, HCT, MCV, PLT in the last 720 hours.       Lab Results   Component Value Date    LABA1C 5.3 11/14/2022     Lab Results   Component Value Date     11/14/2022       No results found for: BNP     Lab Results   Component Value Date    TSH 4.570 (H) 04/08/2022        Results for orders placed or performed in visit on 01/13/23   EKG 12 Lead    Impression    Sinus  Rhythm 72 bpm  Normal axis intervals  Normal ST and T waves  WITHIN NORMAL LIMITS          ASSESSMENT and PLAN    nAna was seen today for excessive sweating and palpitations.    Diagnoses and all orders for this visit:    Chest tightness- recurrent, probably situational stress induced but recurrent and worrying her.  Normal nuclear stress, normal echo.  Follow clinically and add Zegerid twice daily to Pepcid, see below.      Gastroesophageal reflux disease without esophagitis- worsening, intolerant to prescription nexium.  Try OTC Zegerid and prescription famotidine BID for a few weeks.     Hypothyroidism (acquired)- continue meds with PCP surveillance    Mixed hyperlipidemia- stable continue meds with outpatient     Shortness of breath - recurrent and worsening, exertional in nature.  No volume overload or heart failure symptoms.  Normal exam, echo, stress test and ECG . Follow clinically, excellent BP and HR, likely situational anxiety induced.     Return in about 6 months (around 7/13/2023).       AUGUSTINE HEATON MD  1/13/2023  10:51 AM

## 2023-01-13 ENCOUNTER — OFFICE VISIT (OUTPATIENT)
Dept: CARDIOLOGY CLINIC | Age: 73
End: 2023-01-13

## 2023-01-13 VITALS
DIASTOLIC BLOOD PRESSURE: 70 MMHG | SYSTOLIC BLOOD PRESSURE: 120 MMHG | BODY MASS INDEX: 23.22 KG/M2 | HEART RATE: 71 BPM | HEIGHT: 64 IN | WEIGHT: 136 LBS

## 2023-01-13 DIAGNOSIS — E03.9 HYPOTHYROIDISM (ACQUIRED): ICD-10-CM

## 2023-01-13 DIAGNOSIS — R07.89 CHEST TIGHTNESS: ICD-10-CM

## 2023-01-13 DIAGNOSIS — K21.9 GASTROESOPHAGEAL REFLUX DISEASE WITHOUT ESOPHAGITIS: ICD-10-CM

## 2023-01-13 DIAGNOSIS — E78.2 MIXED HYPERLIPIDEMIA: Primary | ICD-10-CM

## 2023-01-31 DIAGNOSIS — F43.9 SITUATIONAL STRESS: ICD-10-CM

## 2023-01-31 DIAGNOSIS — F51.04 CHRONIC INSOMNIA: ICD-10-CM

## 2023-01-31 RX ORDER — CLONAZEPAM 1 MG/1
TABLET ORAL
Qty: 75 TABLET | Refills: 0 | Status: SHIPPED | OUTPATIENT
Start: 2023-01-31 | End: 2023-05-24

## 2023-01-31 NOTE — TELEPHONE ENCOUNTER
Pt called, requests refill of clonazepam 1 mg tablet.     Sent to [facility] on [address]

## 2023-02-16 ENCOUNTER — OFFICE VISIT (OUTPATIENT)
Dept: UROLOGY | Age: 73
End: 2023-02-16

## 2023-02-16 DIAGNOSIS — N39.0 RECURRENT UTI: Primary | ICD-10-CM

## 2023-02-16 LAB
BILIRUBIN, URINE, POC: NEGATIVE
BLOOD URINE, POC: NEGATIVE
GLUCOSE URINE, POC: NEGATIVE
KETONES, URINE, POC: NEGATIVE
LEUKOCYTE ESTERASE, URINE, POC: NORMAL
NITRITE, URINE, POC: NEGATIVE
PH, URINE, POC: 6.5 (ref 4.6–8)
PROTEIN,URINE, POC: NEGATIVE
PVR, POC: 0 CC
SPECIFIC GRAVITY, URINE, POC: 1.02 (ref 1–1.03)
URINALYSIS CLARITY, POC: NORMAL
URINALYSIS COLOR, POC: NORMAL
UROBILINOGEN, POC: NORMAL

## 2023-02-16 ASSESSMENT — ENCOUNTER SYMPTOMS
SKIN LESIONS: 0
COUGH: 0
NAUSEA: 0
CONSTIPATION: 0
INDIGESTION: 0
WHEEZING: 0
BACK PAIN: 0
EYE PAIN: 0
BLOOD IN STOOL: 0
EYE DISCHARGE: 0
ABDOMINAL PAIN: 0
DIARRHEA: 0
HEARTBURN: 0
SHORTNESS OF BREATH: 0
VOMITING: 0

## 2023-02-16 NOTE — PROGRESS NOTES
[facility][1] Urology  [address] [facility] [address], [address] [facility] [address]  [phone]          Priscilla Bateman  : 1950    Chief Complaint   Patient presents with    New Patient     Recurrent UTI          HPI     Priscilla Bateman is a 74 y.o. female who returns in . She was previously seen in . She was (friends with Dr. Oswaldo Malone) originally seen secondary to frequent UTI's and urinary frequency. Also, it takes her a while to get urinary stream started. She moved to [place] from [place], [place] 2013. She originally took 2 months of antibiotic suppression. She did have some hematuria with UTI leading to negative renal ultrasound. Cystoscopy by Dr. Jolene Florez 2013 was normal other than 1+ trebeculations. Gyn exam 2013 revealed grade I cystocele, normal urethra, PVR 250cc. Urodynamics 2014 also revealed  ml's. She was begun on CIC after voiding in . PVR's were ALWAYS only a few ml's. She has NOT performed CIC since . She had issues in  with recurrent uti's. She is under a lot of stress. She has had no UTI in the 2 mo prior to  visit.        U/S PVR: 14 11 ml, 5/15 0 ml, 16 0 ml,  0 ml, 23 0 ml          Past Medical History:   Diagnosis Date    Acoustic neuroma ([facility] [address]     Followed by Dr Ade Hernandez cell carcinoma     Multiple    Depression     Environmental allergies     Seasonal    Epiploic appendagitis Dec 2014    Per CT Scan    GERD (gastroesophageal reflux disease)     Hiatal hernia 2017    Per Endoscopy    Hypothyroidism (acquired)     Insomnia     Internal hemorrhoids 2017    Per Colonoscopy    Melanoma ([facility] [address] 10/2018    R Lower Leg    Menopause     Mild coronary artery disease 2019    Per Cath    Mixed hyperlipidemia     Neurogenic bladder     Osteopenia     managed with Prolia- last injection     PONV (postoperative nausea and vomiting)     Psoriasis     Situational anxiety     Stage 3 chronic kidney disease ([facility] [address] 11/2016    Thrombocytopenia ([facility] [address] 09/2020    Vitamin D deficiency      Past Surgical History:   Procedure Laterality Date    CARDIAC CATHETERIZATION  02/22/2019    CATARACT REMOVAL Bilateral 1/2022, 2/2022    COLONOSCOPY  4/2005 & 1/26/17    Tortuous Colon & Internal Hemorrhoids    COLONOSCOPY  07/29/2022    Hyperplastic Colon Polyp + Diverticulosis - No Further Recommended    MALIGNANT SKIN LESION EXCISION  10/2010    Hx of Basal Cell Carcinoma - L Chest & Nose    MALIGNANT SKIN LESION EXCISION  10/31/2018    R Lower Leg    ORTHOPEDIC SURGERY Right 8/27/15    Trigger Finger Release    ORTHOPEDIC SURGERY Right 9/8/15    Interphalangeal Resection Arthroplasty Foot    ORTHOPEDIC SURGERY Left 02/16/2017    Achilles Reconstruction    GILBERTO AND BSO (CERVIX REMOVED)  1994    UPPER GASTROINTESTINAL ENDOSCOPY  01/26/2017    Hiatal Hernia     Current Outpatient Medications   Medication Sig Dispense Refill    clonazePAM (KLONOPIN) 1 MG tablet Take 1/2-1 tablet po q AM as needed for anxiety & 1.5 tabets po q hs for sleep. 75 tablet 0    famotidine (PEPCID) 40 MG tablet Take 1 tablet by mouth 2 times daily 180 tablet 3    L-Methylfolate-Algae (DEPLIN 15) 15-90.314 MG CAPS Take 15 mg by mouth daily 90 capsule 3    denosumab (PROLIA) 60 MG/ML SOSY SC injection Inject 1 mL into the skin every 6 months 180 mL 1    tretinoin (RETIN-A) 0.025 % cream APPLY A THIN LAYER TO FACE DAILY *OUT OF POCKET EXPENSE, NO PA*      fluorouracil (EFUDEX) 5 % cream APPLY ON FACE, ARMS, AND CHEST X 2 WEEKS, STOP X 2 WEEKS, THEN REAPPLY AT BED X 2 WEEKS      SYNTHROID 50 MCG tablet Take 1 tablet by mouth every morning (before breakfast) Brand medically necessary. 90 tablet 3    VILAZODONE HCL 20 MG Tablet Take 1 tablet by mouth in the morning. 90 tablet 3    nitroGLYCERIN (NITROSTAT) 0.4 MG SL tablet Place 1 SL under the tongue q 5 min prn cp, max 3 SL in a 15-min time period. Call 911 if no relief after the 1st SL.  25 tablet 3    docusate (COLACE, DULCOLAX) 100 MG CAPS Take 100 mg by mouth in the morning. latanoprost (XALATAN) 0.005 % ophthalmic solution INSTILL 1 DROP IN BOTH EYES EVERY EVENING      aspirin 81 MG EC tablet Take 81 mg by mouth      Biotin 2.5 MG CAPS Take 1 tablet by mouth       Cetirizine HCl 10 MG CAPS Take 1 capsule by mouth      clobetasol (TEMOVATE) 0.05 % ointment APPLY TO AFFECTED AREAS TWICE DAILY WHEN FLARES      fluticasone (FLONASE) 50 MCG/ACT nasal spray 2 sprays by Nasal route daily      polyethylene glycol (GLYCOLAX) 17 GM/SCOOP powder Take 17 g by mouth daily       No current facility-administered medications for this visit. Allergies   Allergen Reactions    Morphine Other (See Comments)     \"I almost checked out\"-Trendelenburg  \"I almost checked out\"-Trendelenburg    Nitrofurantoin Diarrhea     And Nausea    Oxycodone-Acetaminophen Diarrhea    Propranolol Other (See Comments)     \"Didn't sleep for 2 days and  I saw green splotches on 2 different days \"  \"Didn't sleep for 2 days and  I saw green splotches on 2 different days \"    Sulfa Antibiotics Other (See Comments)     As a child/ unknown reactions  As a child/ unknown reactions     Social History     Socioeconomic History    Marital status:      Spouse name: Not on file    Number of children: Not on file    Years of education: Not on file    Highest education level: Not on file   Occupational History    Not on file   Tobacco Use    Smoking status: Never    Smokeless tobacco: Never   Vaping Use    Vaping Use: Never used   Substance and Sexual Activity    Alcohol use:  Yes     Alcohol/week: 2.0 standard drinks    Drug use: No    Sexual activity: Not on file   Other Topics Concern    Not on file   Social History Narrative    Not on file     Social Determinants of Health     Financial Resource Strain: Not on file   Food Insecurity: Not on file   Transportation Needs: Not on file   Physical Activity: Insufficiently Active    Days of Exercise per Week: 3 days    Minutes of Exercise per Session: 40 min   Stress: Not on file   Social Connections: Not on file   Intimate Partner Violence: Not on file   Housing Stability: Not on file     Family History   Problem Relation Age of Onset    Stroke Mother     Hypertension Father     Elevated Lipids Mother     Heart Failure Father     Elevated Lipids Father     Diabetes Sister 54        Surgically Induced, Diabetic Coma    Breast Cancer Neg Hx     Heart Disease Father         CAD       Review of Systems  Constitutional:   Negative for fever, chills, appetite change, malaise/fatigue, headaches and weight loss. Skin:  Negative for skin lesions, rash and itching. Eyes:  Negative for visual disturbance, eye pain and eye discharge. ENT:  Negative for difficulty articulating words, pain swallowing, high frequency hearing loss and dry mouth. Respiratory:  Negative for cough, blood in sputum, shortness of breath and wheezing. Cardiovascular:  Negative for chest pain, hypertension, irregular heartbeat, leg pain, leg swelling, regular rate and rhythm and varicose veins. GI:  Negative for nausea, vomiting, abdominal pain, blood in stool, constipation, diarrhea, indigestion and heartburn. Genitourinary: Positive for recurrent UTIs. Negative for urinary burning, hematuria, flank pain, history of urolithiasis, nocturia, slower stream, straining, urgency, leakage w/ urge, frequent urination, incomplete emptying, erectile dysfunction, testicular pain, sexually transmitted disease, discharge and urethral stricture. Musculoskeletal:  Negative for back pain, bone pain, arthralgias, tenderness, muscle weakness and neck pain. Neurological:  Negative for dizziness, focal weakness, numbness, seizures and tremors. Psychological: Positive for depression. Negative for psychiatric problem. Endocrine:  Negative for cold intolerance, thirst, excessive urination, fatigue and heat intolerance.   Hem/Lymphatic:  Negative for easy bleeding, easy bruising and frequent infections. Urinalysis  UA - Dipstick  Results for orders placed or performed in visit on 02/16/23   AMB POC URINALYSIS DIP STICK AUTO W/O MICRO   Result Value Ref Range    Color (UA POC)      Clarity (UA POC)      Glucose, Urine, POC Negative Negative    Bilirubin, Urine, POC Negative Negative    KETONES, Urine, POC Negative Negative    Specific Gravity, Urine, POC 1.025 1.001 - 1.035    Blood (UA POC) Negative Negative    pH, Urine, POC 6.5 4.6 - 8.0    Protein, Urine, POC Negative Negative    Urobilinogen, POC 0.2 mg/dL     Nitrite, Urine, POC Negative Negative    Leukocyte Esterase, Urine, POC Trace Negative   Results for orders placed or performed in visit on 11/29/22   AMB POC URINALYSIS DIP STICK AUTO W/O MICRO   Result Value Ref Range    Color, Urine, POC Yellow     Clarity, Urine, POC Clear     Glucose, Urine, POC Negative Negative    Bilirubin, Urine, POC Negative Negative    Ketones, Urine, POC Negative Negative    Specific Gravity, Urine, POC 1.020 1.001 - 1.035    Blood, Urine, POC Negative Negative    pH, Urine, POC 6.0 4.6 - 8.0    Protein, Urine, POC Negative Negative    Urobilinogen, POC 0.2     Nitrate, Urine, POC Positive Negative    Leukocyte Esterase, Urine, POC Negative Negative       There were no vitals taken for this visit. GENERAL: NAD, ALERT, A&O x 3, GAIT NORMAL  LUNGS: easy work of breathing  ABDOMEN: soft, non tender  NEUROLOGIC: cranial nerves 2-12 grossly intact           Assessment and Plan    ICD-10-CM    1. Recurrent UTI  N39.0 AMB POC URINALYSIS DIP STICK AUTO W/O MICRO     AMB POC PVR, SHANNAN,POST-VOID RES,US,NON-IMAGING          PVR is 0 ml. UA today is clear. I have educated pt. to behavioral changes that can frequently decrease the frequency of infection. These include eliminating constipation, front to back wiping, frequent voiding to keep bladder volumes low. She can drop a UA off for check if needed.   I will see her back in 6 mo, [place].

## 2023-02-23 ENCOUNTER — OFFICE VISIT (OUTPATIENT)
Dept: INTERNAL MEDICINE CLINIC | Facility: CLINIC | Age: 73
End: 2023-02-23
Payer: MEDICARE

## 2023-02-23 VITALS
BODY MASS INDEX: 22.71 KG/M2 | SYSTOLIC BLOOD PRESSURE: 130 MMHG | HEART RATE: 67 BPM | WEIGHT: 133 LBS | HEIGHT: 64 IN | DIASTOLIC BLOOD PRESSURE: 80 MMHG | TEMPERATURE: 97.3 F | OXYGEN SATURATION: 95 %

## 2023-02-23 DIAGNOSIS — M79.605 BILATERAL LEG PAIN: ICD-10-CM

## 2023-02-23 DIAGNOSIS — M79.604 BILATERAL LEG PAIN: ICD-10-CM

## 2023-02-23 DIAGNOSIS — F43.9 SITUATIONAL STRESS: ICD-10-CM

## 2023-02-23 DIAGNOSIS — F33.1 MODERATE RECURRENT MAJOR DEPRESSION (HCC): ICD-10-CM

## 2023-02-23 DIAGNOSIS — R68.89 CHANGE IN BLOOD PRESSURE: ICD-10-CM

## 2023-02-23 DIAGNOSIS — R73.01 ELEVATED FASTING GLUCOSE: ICD-10-CM

## 2023-02-23 DIAGNOSIS — E87.0 HYPERNATREMIA: ICD-10-CM

## 2023-02-23 DIAGNOSIS — N18.31 STAGE 3A CHRONIC KIDNEY DISEASE (HCC): ICD-10-CM

## 2023-02-23 DIAGNOSIS — F51.04 CHRONIC INSOMNIA: ICD-10-CM

## 2023-02-23 DIAGNOSIS — K21.9 GASTROESOPHAGEAL REFLUX DISEASE WITHOUT ESOPHAGITIS: ICD-10-CM

## 2023-02-23 DIAGNOSIS — E55.9 VITAMIN D DEFICIENCY: ICD-10-CM

## 2023-02-23 DIAGNOSIS — D69.6 THROMBOCYTOPENIA (HCC): ICD-10-CM

## 2023-02-23 DIAGNOSIS — E03.9 ACQUIRED HYPOTHYROIDISM: ICD-10-CM

## 2023-02-23 DIAGNOSIS — E78.2 MIXED HYPERLIPIDEMIA: Primary | ICD-10-CM

## 2023-02-23 PROCEDURE — G8484 FLU IMMUNIZE NO ADMIN: HCPCS | Performed by: PHYSICIAN ASSISTANT

## 2023-02-23 PROCEDURE — 1036F TOBACCO NON-USER: CPT | Performed by: PHYSICIAN ASSISTANT

## 2023-02-23 PROCEDURE — 3017F COLORECTAL CA SCREEN DOC REV: CPT | Performed by: PHYSICIAN ASSISTANT

## 2023-02-23 PROCEDURE — G8420 CALC BMI NORM PARAMETERS: HCPCS | Performed by: PHYSICIAN ASSISTANT

## 2023-02-23 PROCEDURE — 99215 OFFICE O/P EST HI 40 MIN: CPT | Performed by: PHYSICIAN ASSISTANT

## 2023-02-23 PROCEDURE — 1123F ACP DISCUSS/DSCN MKR DOCD: CPT | Performed by: PHYSICIAN ASSISTANT

## 2023-02-23 PROCEDURE — G8427 DOCREV CUR MEDS BY ELIG CLIN: HCPCS | Performed by: PHYSICIAN ASSISTANT

## 2023-02-23 PROCEDURE — 1090F PRES/ABSN URINE INCON ASSESS: CPT | Performed by: PHYSICIAN ASSISTANT

## 2023-02-23 PROCEDURE — G8399 PT W/DXA RESULTS DOCUMENT: HCPCS | Performed by: PHYSICIAN ASSISTANT

## 2023-02-23 RX ORDER — OMEPRAZOLE/SODIUM BICARBONATE 20MG-1.1G
CAPSULE ORAL
COMMUNITY

## 2023-02-23 RX ORDER — CLONAZEPAM 1 MG/1
TABLET ORAL
Qty: 75 TABLET | Refills: 4 | Status: SHIPPED | OUTPATIENT
Start: 2023-03-03 | End: 2023-06-16

## 2023-02-23 RX ORDER — LEVOMEFOLATE/ALGAL OIL 15-90.314
15 CAPSULE ORAL DAILY
Qty: 90 CAPSULE | Refills: 3 | Status: SHIPPED | OUTPATIENT
Start: 2023-02-23

## 2023-02-23 SDOH — ECONOMIC STABILITY: HOUSING INSECURITY
IN THE LAST 12 MONTHS, WAS THERE A TIME WHEN YOU DID NOT HAVE A STEADY PLACE TO SLEEP OR SLEPT IN A SHELTER (INCLUDING NOW)?: NO

## 2023-02-23 SDOH — ECONOMIC STABILITY: INCOME INSECURITY: HOW HARD IS IT FOR YOU TO PAY FOR THE VERY BASICS LIKE FOOD, HOUSING, MEDICAL CARE, AND HEATING?: NOT HARD AT ALL

## 2023-02-23 SDOH — ECONOMIC STABILITY: FOOD INSECURITY: WITHIN THE PAST 12 MONTHS, YOU WORRIED THAT YOUR FOOD WOULD RUN OUT BEFORE YOU GOT MONEY TO BUY MORE.: NEVER TRUE

## 2023-02-23 SDOH — ECONOMIC STABILITY: FOOD INSECURITY: WITHIN THE PAST 12 MONTHS, THE FOOD YOU BOUGHT JUST DIDN'T LAST AND YOU DIDN'T HAVE MONEY TO GET MORE.: NEVER TRUE

## 2023-02-23 ASSESSMENT — PATIENT HEALTH QUESTIONNAIRE - PHQ9
SUM OF ALL RESPONSES TO PHQ9 QUESTIONS 1 & 2: 3
7. TROUBLE CONCENTRATING ON THINGS, SUCH AS READING THE NEWSPAPER OR WATCHING TELEVISION: 1
5. POOR APPETITE OR OVEREATING: 1
2. FEELING DOWN, DEPRESSED OR HOPELESS: 2
6. FEELING BAD ABOUT YOURSELF - OR THAT YOU ARE A FAILURE OR HAVE LET YOURSELF OR YOUR FAMILY DOWN: 1
4. FEELING TIRED OR HAVING LITTLE ENERGY: 1
SUM OF ALL RESPONSES TO PHQ QUESTIONS 1-9: 9
8. MOVING OR SPEAKING SO SLOWLY THAT OTHER PEOPLE COULD HAVE NOTICED. OR THE OPPOSITE, BEING SO FIGETY OR RESTLESS THAT YOU HAVE BEEN MOVING AROUND A LOT MORE THAN USUAL: 0
1. LITTLE INTEREST OR PLEASURE IN DOING THINGS: 1
SUM OF ALL RESPONSES TO PHQ QUESTIONS 1-9: 9
SUM OF ALL RESPONSES TO PHQ QUESTIONS 1-9: 9
3. TROUBLE FALLING OR STAYING ASLEEP: 2
10. IF YOU CHECKED OFF ANY PROBLEMS, HOW DIFFICULT HAVE THESE PROBLEMS MADE IT FOR YOU TO DO YOUR WORK, TAKE CARE OF THINGS AT HOME, OR GET ALONG WITH OTHER PEOPLE: 1
9. THOUGHTS THAT YOU WOULD BE BETTER OFF DEAD, OR OF HURTING YOURSELF: 0
SUM OF ALL RESPONSES TO PHQ QUESTIONS 1-9: 9

## 2023-02-23 ASSESSMENT — ANXIETY QUESTIONNAIRES
1. FEELING NERVOUS, ANXIOUS, OR ON EDGE: 2
IF YOU CHECKED OFF ANY PROBLEMS ON THIS QUESTIONNAIRE, HOW DIFFICULT HAVE THESE PROBLEMS MADE IT FOR YOU TO DO YOUR WORK, TAKE CARE OF THINGS AT HOME, OR GET ALONG WITH OTHER PEOPLE: SOMEWHAT DIFFICULT
6. BECOMING EASILY ANNOYED OR IRRITABLE: 0
2. NOT BEING ABLE TO STOP OR CONTROL WORRYING: 2
GAD7 TOTAL SCORE: 6
4. TROUBLE RELAXING: 1
7. FEELING AFRAID AS IF SOMETHING AWFUL MIGHT HAPPEN: 0
5. BEING SO RESTLESS THAT IT IS HARD TO SIT STILL: 0
3. WORRYING TOO MUCH ABOUT DIFFERENT THINGS: 1

## 2023-02-23 ASSESSMENT — ENCOUNTER SYMPTOMS
CONSTIPATION: 0
DIARRHEA: 0

## 2023-02-23 NOTE — PATIENT INSTRUCTIONS
Trial discontinuation of Zegerid as I suspect it may be the cause of BP elevation & potentially even leg pain - asked to call with update in ~ 2 weeks  Strongly encouraged to reduce intake of sweets/junk foods to help improve cholesterol panel  Recommend improved consistency with a regular cardiovascular exercise routine which will benefit many areas of her physical and mental health  Monitor blood pressure 2-3 times/week and keep record to bring to next appointment  Advised to keep omega supplements in fridge/freezer to reduce GI irritation  Praised increased hydration and urged to maintain this  The [place] database was verified to have no suspicious activity before the issuance of a controlled substance prescription

## 2023-02-23 NOTE — PROGRESS NOTES
Angie Paredes (:  1950) is a 74 y.o. female,Established patient, here for evaluation of the following chief complaint(s):  Follow-up (Hypothyroidism, GERD, Hyperlipidemia, Elevated Glucose, Depression) and Discuss Labs         ASSESSMENT/PLAN:  1. Mixed hyperlipidemia  -     Comprehensive Metabolic Panel; Future  -     Lipid Panel; Future  2. Moderate recurrent major depression ([facility] [address]  -     L-Methylfolate-Algae (DEPLIN 15) 15-90.314 MG CAPS; Take 15 mg by mouth daily, Disp-90 capsule, R-3Normal  3. Chronic insomnia  -     clonazePAM (KLONOPIN) 1 MG tablet; Take 1/2-1 tablet po q AM as needed for anxiety & 1.5 tabets po q hs for sleep., Disp-75 tablet, R-4Normal  4. Situational stress  -     clonazePAM (KLONOPIN) 1 MG tablet; Take 1/2-1 tablet po q AM as needed for anxiety & 1.5 tabets po q hs for sleep., Disp-75 tablet, R-4Normal  5. Stage 3a chronic kidney disease (HCC)  -     CBC with Auto Differential; Future  -     Comprehensive Metabolic Panel; Future  6. Elevated fasting glucose  -     Comprehensive Metabolic Panel; Future  -     Hemoglobin A1C; Future  7. Hypernatremia  -     Comprehensive Metabolic Panel; Future  8. Thrombocytopenia (HCC)  -     CBC with Auto Differential; Future  9. Acquired hypothyroidism  -     TSH; Future  10. Bilateral leg pain  11. Change in blood pressure  12. Gastroesophageal reflux disease without esophagitis  13.  Vitamin D deficiency      Patient Instructions   Trial discontinuation of Zegerid as I suspect it may be the cause of BP elevation & potentially even leg pain - asked to call with update in ~ 2 weeks  Strongly encouraged to reduce intake of sweets/junk foods to help improve cholesterol panel  Recommend improved consistency with a regular cardiovascular exercise routine which will benefit many areas of her physical and mental health  Monitor blood pressure 2-3 times/week and keep record to bring to next appointment  Advised to keep omega supplements in fridge/freezer to reduce GI irritation  Praised increased hydration and urged to maintain this  The SCRIPTS database was verified to have no suspicious activity before the issuance of a controlled substance prescription      Return in about 4 months (around 6/23/2023) for routine f/u.         Subjective   SUBJECTIVE/OBJECTIVE:  HPI  The patient is a 73 y.o. female who is seen for evaluation of hyperlipidemia.  She was tested because of mixed hyperlipidemia.  The current state of this condition is asymptomatic and mixed results - triglycerides improved but LDL worsened a bit  on OTC MegaRed supplement daily - taking as prescribed.  She denies any consistent dietary changes over the past few months and does sporadically exercise.  Her new eye doctor recently advised her to take De3 Omega Benefits x 2 daily so she is taking that now in place of MegaRed.       Last Lipid Panel:   Lab Results   Component Value Date    CHOL 203 (H) 02/14/2023    CHOL 207 (H) 11/14/2022    CHOL 201 (H) 07/12/2022     Lab Results   Component Value Date    TRIG 205 (H) 02/14/2023    TRIG 323 (H) 11/14/2022    TRIG 186 (H) 07/12/2022     Lab Results   Component Value Date    HDL 42 02/14/2023    HDL 35 (L) 11/14/2022    HDL 46 07/12/2022     Lab Results   Component Value Date    LDLCALC 120 (H) 02/14/2023    LDLCALC 107.4 (H) 11/14/2022    LDLCALC 117.8 (H) 07/12/2022     Lab Results   Component Value Date    LABVLDL 41 (H) 02/14/2023    LABVLDL 64.6 (H) 11/14/2022    LABVLDL 37.2 (H) 07/12/2022    VLDL 37 04/08/2022    VLDL 27 01/04/2022    VLDL 45 (H) 09/09/2021     Lab Results   Component Value Date    CHOLHDLRATIO 4.8 02/14/2023    CHOLHDLRATIO 5.9 11/14/2022    CHOLHDLRATIO 4.4 07/12/2022       Patient is here for follow-up of elevated glucose.  The current state of this condition is asymptomatic and stable - not currently on medications for this problem.  She denies any consistent dietary changes over the past 3 months and is only  exercising sporadically. Hemoglobin A1C   Date Value Ref Range Status   02/14/2023 5.4 4.8 - 5.6 % Final       The patient is a 74 y.o. female who is seen for follow up of hypothyroidism. Current symptoms: heat / cold intolerance and palpitations. Symptoms are  episodic . The patient is following treatment regimen with good compliance. Current treatment regimen consists of Synthroid 50 mcg daily and is  taking it first thing in the AM on an empty stomach with no other food/liquids/other medications for at least 45-60 minutes. Lab Results   Component Value Date    NVH5NYJ 3.190 02/14/2023    RTK6QRW 1.560 11/14/2022    YZE0UDQ 1.510 07/12/2022    TSH 4.570 (H) 04/08/2022    TSH 3.170 01/04/2022    TSH 2.600 09/09/2021     No results found for: T4FREE  No results found for: TGAB      Patient is here for follow-up of vitamin d deficiency. The current state of this condition is no significant medication side effects noted and reasonably well controlled on OTC Bone Strength supplement bid (calcium 256 mg + 333 iu/pill) - taking as prescribed. Vit D, 25-Hydroxy   Date Value Ref Range Status   02/14/2023 37.3 30.0 - 100.0 ng/mL Final   04/08/2022 47.8 30.0 - 100.0 ng/mL Final     Comment:     Vitamin D deficiency has been defined by the [address] practice guideline as a  level of serum 25-OH vitamin D less than 20 ng/mL (1,2). The Endocrine Society went on to further define vitamin D  insufficiency as a level between 21 and 29 ng/mL (2). 1. IOM ([place] of Medicine). 2010. Dietary reference     intakes for calcium and D. [facility]: The     mWater. 2. Dani MF, Dayo NC, Christian REID, et al.     Evaluation, treatment, and prevention of vitamin D     deficiency: an Endocrine Society clinical practice     guideline. JCEM.  2011 Jul; 96(7):1911-30.     01/04/2022 51.6 30.0 - 100.0 ng/mL Final     Comment:     Vitamin D deficiency has been defined by the [address] practice guideline as a  level of serum 25-OH vitamin D less than 20 ng/mL (1,2). The Endocrine Society went on to further define vitamin D  insufficiency as a level between 21 and 29 ng/mL (2). 1. IOM ([place] of Medicine). 2010. Dietary reference     intakes for calcium and D. [facility]: The     [employer]. 2. Dani MF, Dayo CASEY, Christian REID, et al.     Evaluation, treatment, and prevention of vitamin D     deficiency: an Endocrine Society clinical practice     guideline. JCEM. 2011 Jul; 96(7):1911-30. Patient is here for follow-up of chronic insomnia. The current state of this condition is no significant medication side effects noted and well controlled on Clonazepam 1.5 mg (1.5 x 1 mg tablets) q hs - taking as prescribed. Current prescription as filled 2/1/23 for #75 with no refills. Patient is here for follow-up of chronic kidney disease. The current state of this condition is asymptomatic and improved - not currently on medications for this problem. She has been drinking a lot more water lately. Lab Results   Component Value Date    CREATININE 1.00 02/14/2023    BUN 19 02/14/2023     (H) 02/14/2023    K 4.3 02/14/2023     (H) 02/14/2023    CO2 27 02/14/2023       Patient is here for follow-up of major recurrent depression with situational anxiety. The current state of this condition is no significant medication side effects noted and reasonably well controlled on Viibryd 20 mg daily + Deplin 15 mg daily +/- Clonazepam 0.5-1 mg daily prn (takes infrequently when she is going to be around a group of people) - taking as prescribed. She is seeing her counselor 1 time/month. Patient is here for follow-up of thrombocytopenia. The current state of this condition is asymptomatic and poorly controlled - not currently on medications for this problem.     Lab Results   Component Value Date    WBC 5.5 02/14/2023    WBC 5.7 11/14/2022    WBC 5.8 07/12/2022     Lab Results   Component Value Date    HGB 13.2 02/14/2023    HGB 13.0 11/14/2022    HGB 13.3 07/12/2022     Lab Results   Component Value Date    HCT 38.3 02/14/2023    HCT 36.9 11/14/2022    HCT 39.8 07/12/2022     Lab Results   Component Value Date    MCV 97.5 02/14/2023    .8 11/14/2022    MCV 98.0 (H) 07/12/2022     Lab Results   Component Value Date     (L) 02/14/2023     11/14/2022     (L) 07/12/2022       Patient is here for follow-up of GERD. The current state of this condition is poorly controlled on Famotidine 40 mg bid + OTC Zegerid bid - taking as prescribed since recommended by cardiology in January. She notes only minimal improvement in her reflux symptoms since adding Zegerid. She did not report any breakthrough symptoms to me in November so I wonder if addition of MegaRed supplement was aggravating to the stomach despite keeping it in the fridge. She was recently switched to a specialized [place] supplement via ophthalmologist - De3 which she is taking 1 capsule twice daily for the past 2 weeks. Additional concerns addressed today include bilateral aching in legs during the night x 3 weeks. She explains that they are not cramps but rather just diffuse aching from hips to her feet despite drinking a lot more water. She also notes elevation in blood pressure from 130/60-70s in the morning to 130-160s/80s later in the day which is very unusual for her. Review of Systems   Constitutional:  Positive for fatigue. Negative for unexpected weight change. Cardiovascular:  Positive for palpitations. Gastrointestinal:  Negative for constipation and diarrhea. Positive for heartburn   Endocrine: Positive for heat intolerance. Negative for cold intolerance. Negative for hair loss   Musculoskeletal:  Positive for myalgias (bilateral aching of legs at night). Psychiatric/Behavioral:  Positive for dysphoric mood. Negative for sleep disturbance (recently restless due to legs aching). The patient is nervous/anxious. /80 (Site: Right Upper Arm, Position: Sitting, Cuff Size: Small Adult)   Pulse 67   Temp 97.3 °F (36.3 °C) (Temporal)   Ht 5' 4\" (1.626 m)   Wt 133 lb (60.3 kg)   SpO2 95%   BMI 22.83 kg/m²       Objective   Physical Exam  Constitutional:       Appearance: Normal appearance. HENT:      Head: Normocephalic and atraumatic. Eyes:      Conjunctiva/sclera: Conjunctivae normal.      Pupils: Pupils are equal, round, and reactive to light. Neck:      Vascular: No carotid bruit. Cardiovascular:      Rate and Rhythm: Normal rate and regular rhythm. Heart sounds: Normal heart sounds. Pulmonary:      Effort: Pulmonary effort is normal.      Breath sounds: Normal breath sounds. Musculoskeletal:         General: Normal range of motion. Cervical back: Normal range of motion. Right lower leg: No edema. Left lower leg: No edema. Skin:     General: Skin is warm and dry. Neurological:      Mental Status: She is alert and oriented to person, place, and time. Psychiatric:         Mood and Affect: Mood normal.         Behavior: Behavior normal.         Thought Content:  Thought content normal.         Judgment: Judgment normal.        PHQ-9 Questionaire 2/23/2023 11/21/2022 11/1/2022 10/18/2022 8/8/2022 7/18/2022 6/14/2022   Little interest or pleasure in doing things 1 1 0 0 0 1 0   Feeling down, depressed, or hopeless 2 1 0 0 0 1 0   Trouble falling or staying asleep, or sleeping too much 2 0 - - - 1 -   Feeling tired or having little energy 1 2 - - - 1 -   Poor appetite or overeating 1 2 - - - 1 -   Feeling bad about yourself - or that you are a failure or have let yourself or your family down 1 1 - - - 1 -   Trouble concentrating on things, such as reading the newspaper or watching television 1 1 - - - 1 -   Moving or speaking so slowly that other people could have noticed. Or the opposite - being so fidgety or restless that you have been moving around a lot more than usual 0 0 - - - 0 -   Thoughts that you would be better off dead, or of hurting yourself in some way 0 0 - - - 0 -   PHQ-9 Total Score 9 8 0 0 0 7 0   If you checked off any problems, how difficult have these problems made it for you to do your work, take care of things at home, or get along with other people? 1 1 - - - 0 -       TARAH-7 SCREENING 2/23/2023   Feeling nervous, anxious, or on edge More than half the days   Not being able to stop or control worrying More than half the days   Worrying too much about different things Several days   Trouble relaxing Several days   Being so restless that it is hard to sit still Not at all   Becoming easily annoyed or irritable Not at all   Feeling afraid as if something awful might happen Not at all   TARAH-7 Total Score 6   How difficult have these problems made it for you to do your work, take care of things at home, or get along with other people? Somewhat difficult       On this date 2/23/2023 I have spent 45 minutes reviewing previous notes, test results and face to face with the patient discussing the diagnosis and importance of compliance with the treatment plan as well as documenting on the day of the visit. An electronic signature was used to authenticate this note.     --Navi Klein PA-C

## 2023-02-28 ENCOUNTER — TELEPHONE (OUTPATIENT)
Dept: INTERNAL MEDICINE CLINIC | Facility: CLINIC | Age: 73
End: 2023-02-28

## 2023-02-28 NOTE — TELEPHONE ENCOUNTER
PA faxed to [facility] for branded Synthroid 50 mcg tablets on 2/28/23. Waiting on determination.

## 2023-06-16 DIAGNOSIS — E03.9 ACQUIRED HYPOTHYROIDISM: ICD-10-CM

## 2023-06-16 RX ORDER — LEVOTHYROXINE SODIUM 50 MCG
50 TABLET ORAL
Qty: 90 TABLET | Refills: 3 | OUTPATIENT
Start: 2023-06-16

## 2023-06-19 ENCOUNTER — HOSPITAL ENCOUNTER (OUTPATIENT)
Dept: LAB | Age: 73
Discharge: HOME | End: 2023-06-22

## 2023-06-19 DIAGNOSIS — E78.2 MIXED HYPERLIPIDEMIA: ICD-10-CM

## 2023-06-19 DIAGNOSIS — N18.31 STAGE 3A CHRONIC KIDNEY DISEASE (HCC): ICD-10-CM

## 2023-06-19 DIAGNOSIS — R73.01 ELEVATED FASTING GLUCOSE: ICD-10-CM

## 2023-06-19 DIAGNOSIS — E03.9 ACQUIRED HYPOTHYROIDISM: ICD-10-CM

## 2023-06-19 DIAGNOSIS — E87.0 HYPERNATREMIA: ICD-10-CM

## 2023-06-19 DIAGNOSIS — D69.6 THROMBOCYTOPENIA (HCC): ICD-10-CM

## 2023-06-19 LAB
ALBUMIN SERPL-MCNC: 3.7 G/DL (ref 3.2–4.6)
ALBUMIN/GLOB SERPL: 1.4 (ref 0.4–1.6)
ALP SERPL-CCNC: 64 U/L (ref 50–136)
ALT SERPL-CCNC: 21 U/L (ref 12–65)
ANION GAP SERPL CALC-SCNC: 5 MMOL/L (ref 2–11)
AST SERPL-CCNC: 21 U/L (ref 15–37)
BASOPHILS # BLD: 0.1 K/UL (ref 0–0.2)
BASOPHILS NFR BLD: 2 % (ref 0–2)
BILIRUB SERPL-MCNC: 1 MG/DL (ref 0.2–1.1)
BUN SERPL-MCNC: 20 MG/DL (ref 8–23)
CALCIUM SERPL-MCNC: 8.8 MG/DL (ref 8.3–10.4)
CHLORIDE SERPL-SCNC: 112 MMOL/L (ref 101–110)
CHOLEST SERPL-MCNC: 209 MG/DL
CO2 SERPL-SCNC: 25 MMOL/L (ref 21–32)
CREAT SERPL-MCNC: 1.1 MG/DL (ref 0.6–1)
DIFFERENTIAL METHOD BLD: ABNORMAL
EOSINOPHIL # BLD: 0.1 K/UL (ref 0–0.8)
EOSINOPHIL NFR BLD: 2 % (ref 0.5–7.8)
ERYTHROCYTE [DISTWIDTH] IN BLOOD BY AUTOMATED COUNT: 14.7 % (ref 11.9–14.6)
EST. AVERAGE GLUCOSE BLD GHB EST-MCNC: 114 MG/DL
GLOBULIN SER CALC-MCNC: 2.7 G/DL (ref 2.8–4.5)
GLUCOSE SERPL-MCNC: 104 MG/DL (ref 65–100)
HBA1C MFR BLD: 5.6 % (ref 4.8–5.6)
HCT VFR BLD AUTO: 36.6 % (ref 35.8–46.3)
HDLC SERPL-MCNC: 45 MG/DL (ref 40–60)
HDLC SERPL: 4.6
HGB BLD-MCNC: 12.6 G/DL (ref 11.7–15.4)
IMM GRANULOCYTES # BLD AUTO: 0 K/UL (ref 0–0.5)
IMM GRANULOCYTES NFR BLD AUTO: 0 % (ref 0–5)
LDLC SERPL CALC-MCNC: 128.4 MG/DL
LYMPHOCYTES # BLD: 2.1 K/UL (ref 0.5–4.6)
LYMPHOCYTES NFR BLD: 42 % (ref 13–44)
MCH RBC QN AUTO: 33.7 PG (ref 26.1–32.9)
MCHC RBC AUTO-ENTMCNC: 34.4 G/DL (ref 31.4–35)
MCV RBC AUTO: 97.9 FL (ref 82–102)
MONOCYTES # BLD: 0.5 K/UL (ref 0.1–1.3)
MONOCYTES NFR BLD: 11 % (ref 4–12)
NEUTS SEG # BLD: 2.1 K/UL (ref 1.7–8.2)
NEUTS SEG NFR BLD: 43 % (ref 43–78)
NRBC # BLD: 0 K/UL (ref 0–0.2)
PLATELET # BLD AUTO: 129 K/UL (ref 150–450)
PMV BLD AUTO: 12.5 FL (ref 9.4–12.3)
POTASSIUM SERPL-SCNC: 4 MMOL/L (ref 3.5–5.1)
PROT SERPL-MCNC: 6.4 G/DL (ref 6.3–8.2)
RBC # BLD AUTO: 3.74 M/UL (ref 4.05–5.2)
SODIUM SERPL-SCNC: 142 MMOL/L (ref 133–143)
TRIGL SERPL-MCNC: 178 MG/DL (ref 35–150)
TSH, 3RD GENERATION: 2.34 UIU/ML (ref 0.36–3.74)
VLDLC SERPL CALC-MCNC: 35.6 MG/DL (ref 6–23)
WBC # BLD AUTO: 4.9 K/UL (ref 4.3–11.1)

## 2023-06-26 ENCOUNTER — OFFICE VISIT (OUTPATIENT)
Dept: INTERNAL MEDICINE CLINIC | Facility: CLINIC | Age: 73
End: 2023-06-26
Payer: MEDICARE

## 2023-06-26 VITALS
TEMPERATURE: 97.4 F | HEART RATE: 81 BPM | BODY MASS INDEX: 22.71 KG/M2 | SYSTOLIC BLOOD PRESSURE: 134 MMHG | WEIGHT: 133 LBS | HEIGHT: 64 IN | OXYGEN SATURATION: 98 % | DIASTOLIC BLOOD PRESSURE: 74 MMHG

## 2023-06-26 DIAGNOSIS — E03.9 ACQUIRED HYPOTHYROIDISM: ICD-10-CM

## 2023-06-26 DIAGNOSIS — N18.31 STAGE 3A CHRONIC KIDNEY DISEASE (HCC): ICD-10-CM

## 2023-06-26 DIAGNOSIS — E55.9 VITAMIN D DEFICIENCY: Primary | ICD-10-CM

## 2023-06-26 DIAGNOSIS — F33.1 MODERATE RECURRENT MAJOR DEPRESSION (HCC): ICD-10-CM

## 2023-06-26 DIAGNOSIS — E78.2 MIXED HYPERLIPIDEMIA: Primary | ICD-10-CM

## 2023-06-26 DIAGNOSIS — R73.01 ELEVATED FASTING GLUCOSE: ICD-10-CM

## 2023-06-26 DIAGNOSIS — D69.6 THROMBOCYTOPENIA (HCC): ICD-10-CM

## 2023-06-26 DIAGNOSIS — K21.9 GASTROESOPHAGEAL REFLUX DISEASE WITHOUT ESOPHAGITIS: ICD-10-CM

## 2023-06-26 DIAGNOSIS — F51.04 CHRONIC INSOMNIA: ICD-10-CM

## 2023-06-26 DIAGNOSIS — F43.9 SITUATIONAL STRESS: ICD-10-CM

## 2023-06-26 PROCEDURE — G8427 DOCREV CUR MEDS BY ELIG CLIN: HCPCS | Performed by: PHYSICIAN ASSISTANT

## 2023-06-26 PROCEDURE — 1090F PRES/ABSN URINE INCON ASSESS: CPT | Performed by: PHYSICIAN ASSISTANT

## 2023-06-26 PROCEDURE — 1036F TOBACCO NON-USER: CPT | Performed by: PHYSICIAN ASSISTANT

## 2023-06-26 PROCEDURE — G2212 PROLONG OUTPT/OFFICE VIS: HCPCS | Performed by: PHYSICIAN ASSISTANT

## 2023-06-26 PROCEDURE — 1123F ACP DISCUSS/DSCN MKR DOCD: CPT | Performed by: PHYSICIAN ASSISTANT

## 2023-06-26 PROCEDURE — 3017F COLORECTAL CA SCREEN DOC REV: CPT | Performed by: PHYSICIAN ASSISTANT

## 2023-06-26 PROCEDURE — G8420 CALC BMI NORM PARAMETERS: HCPCS | Performed by: PHYSICIAN ASSISTANT

## 2023-06-26 PROCEDURE — G8399 PT W/DXA RESULTS DOCUMENT: HCPCS | Performed by: PHYSICIAN ASSISTANT

## 2023-06-26 PROCEDURE — 99215 OFFICE O/P EST HI 40 MIN: CPT | Performed by: PHYSICIAN ASSISTANT

## 2023-06-26 RX ORDER — LEVOTHYROXINE SODIUM 50 MCG
50 TABLET ORAL
Qty: 90 TABLET | Refills: 3 | Status: SHIPPED | OUTPATIENT
Start: 2023-06-26

## 2023-06-26 RX ORDER — VILAZODONE HYDROCHLORIDE 20 MG/1
20 TABLET ORAL DAILY
Qty: 90 TABLET | Refills: 3 | Status: SHIPPED | OUTPATIENT
Start: 2023-06-26

## 2023-06-26 RX ORDER — MAGNESIUM HYDROXIDE/ALUMINUM HYDROXICE/SIMETHICONE 120; 1200; 1200 MG/30ML; MG/30ML; MG/30ML
5 SUSPENSION ORAL NIGHTLY PRN
COMMUNITY

## 2023-06-26 RX ORDER — CLONAZEPAM 1 MG/1
TABLET ORAL
Qty: 75 TABLET | Refills: 4 | Status: CANCELLED | OUTPATIENT
Start: 2023-06-26 | End: 2023-10-09

## 2023-06-26 ASSESSMENT — ENCOUNTER SYMPTOMS
ABDOMINAL DISTENTION: 1
CONSTIPATION: 1
NAUSEA: 0
ABDOMINAL PAIN: 0
SHORTNESS OF BREATH: 0
VOMITING: 0
DIARRHEA: 0
BLOOD IN STOOL: 0

## 2023-06-27 ENCOUNTER — TELEPHONE (OUTPATIENT)
Dept: INTERNAL MEDICINE CLINIC | Facility: CLINIC | Age: 73
End: 2023-06-27

## 2023-07-03 ENCOUNTER — HOSPITAL ENCOUNTER (OUTPATIENT)
Dept: INFUSION THERAPY | Age: 73
Discharge: HOME | End: 2023-07-03
Payer: MEDICARE

## 2023-07-03 VITALS
RESPIRATION RATE: 16 BRPM | TEMPERATURE: 97.8 F | SYSTOLIC BLOOD PRESSURE: 106 MMHG | HEART RATE: 68 BPM | DIASTOLIC BLOOD PRESSURE: 63 MMHG

## 2023-07-03 DIAGNOSIS — M85.851 OSTEOPENIA OF BOTH HIPS: Primary | ICD-10-CM

## 2023-07-03 DIAGNOSIS — M85.852 OSTEOPENIA OF BOTH HIPS: Primary | ICD-10-CM

## 2023-07-03 PROCEDURE — 6360000002 HC RX W HCPCS: Performed by: PHYSICIAN ASSISTANT

## 2023-07-03 PROCEDURE — 96372 THER/PROPH/DIAG INJ SC/IM: CPT

## 2023-07-03 RX ORDER — EPINEPHRINE 1 MG/ML
0.3 INJECTION, SOLUTION, CONCENTRATE INTRAVENOUS PRN
OUTPATIENT
Start: 2023-11-20

## 2023-07-03 RX ORDER — SODIUM CHLORIDE 9 MG/ML
INJECTION, SOLUTION INTRAVENOUS CONTINUOUS
OUTPATIENT
Start: 2023-11-20

## 2023-07-03 RX ORDER — ALBUTEROL SULFATE 90 UG/1
4 AEROSOL, METERED RESPIRATORY (INHALATION) PRN
OUTPATIENT
Start: 2023-11-20

## 2023-07-03 RX ORDER — ACETAMINOPHEN 325 MG/1
650 TABLET ORAL
OUTPATIENT
Start: 2023-11-20

## 2023-07-03 RX ORDER — DIPHENHYDRAMINE HYDROCHLORIDE 50 MG/ML
50 INJECTION INTRAMUSCULAR; INTRAVENOUS
OUTPATIENT
Start: 2023-11-20

## 2023-07-03 RX ORDER — ONDANSETRON 2 MG/ML
8 INJECTION INTRAMUSCULAR; INTRAVENOUS
OUTPATIENT
Start: 2023-11-20

## 2023-07-03 RX ADMIN — DENOSUMAB 60 MG: 60 INJECTION SUBCUTANEOUS at 14:30

## 2023-07-03 NOTE — PROGRESS NOTES
7/3/2023  Pt to Infusion without complaints. Pt received Prolia per order, tolerated well. No follow up with Infusion at this time. Patient instructed to call provider with any issues/concerns. Discharged home.

## 2023-07-09 PROBLEM — I25.10 MILD CORONARY ARTERY DISEASE: Status: ACTIVE | Noted: 2019-02-25

## 2023-07-10 ASSESSMENT — ENCOUNTER SYMPTOMS
SORE THROAT: 0
SHORTNESS OF BREATH: 0
PHOTOPHOBIA: 0
ABDOMINAL DISTENTION: 0
COUGH: 0
CONSTIPATION: 0
ABDOMINAL PAIN: 0
DIARRHEA: 0

## 2023-07-10 NOTE — PROGRESS NOTES
[facility] CARDIOLOGY  [address] [address] [facility] [place], [place]  PHONE: [phone]        NAME:  Michael Zhou  : 1950  MRN: [id number]     PCP:  Tammie Interiano PA-C      SUBJECTIVE:   Michael Zohu is a 74 y.o. female seen for a follow up visit regarding the following:     Chief Complaint   Patient presents with    6 Month Follow-Up    Hyperlipidemia    Shortness of Breath     HPI:    She presented recently to [facility] care, last seen in the catheterization lab 2019 in the setting of recurrent substernal chest discomfort despite a negative nuclear stress test.    Cardiac cath 2019:  1. Minimal coronary disease as described above. 2.  Noncardiac chest symptoms most likely. 3.  Normal left ventricular regional wall motion and ejection fraction. 4.  Elevated left ventricular end-diastolic pressure. Under lots of situational stress recently with recurrent SSCP associated with severely stressful situations. Having recurrent CP, SOB, DAVIS, and acute diaphoresis with low level stress. Exam and ECG benign. Wonders if symptoms could be due to antidepressant but doesn't want to stop it at present. Echo 2022:  Left Ventricle Normal left ventricular systolic function with a visually estimated EF of 60 - 65%. Left ventricle size is normal. Normal wall thickness. Normal wall motion. Normal diastolic function. Left Atrium Left atrium size is normal. LA Vol Index is  27 ml/m2. Right Ventricle Right ventricle size is normal. Normal wall thickness. RV basal diameter is 3.0 cm. RV mid diameter is 2.1 cm. Normal systolic function. Right Atrium Right atrium size is normal.   Aortic Valve Valve structure is normal. No regurgitation. No stenosis. Mitral Valve Valve structure is normal. Trace regurgitation. No stenosis noted. Tricuspid Valve Valve structure is normal. Mild regurgitation. No stenosis noted. The estimated RVSP is 17 mmHg.

## 2023-07-14 ENCOUNTER — OFFICE VISIT (OUTPATIENT)
Age: 73
End: 2023-07-14
Payer: MEDICARE

## 2023-07-14 VITALS
SYSTOLIC BLOOD PRESSURE: 112 MMHG | WEIGHT: 132 LBS | HEIGHT: 64 IN | HEART RATE: 72 BPM | DIASTOLIC BLOOD PRESSURE: 68 MMHG | BODY MASS INDEX: 22.53 KG/M2

## 2023-07-14 DIAGNOSIS — K21.9 GASTROESOPHAGEAL REFLUX DISEASE WITHOUT ESOPHAGITIS: Primary | ICD-10-CM

## 2023-07-14 DIAGNOSIS — E78.2 MIXED HYPERLIPIDEMIA: ICD-10-CM

## 2023-07-14 DIAGNOSIS — E03.9 HYPOTHYROIDISM (ACQUIRED): ICD-10-CM

## 2023-07-14 DIAGNOSIS — I25.10 MILD CORONARY ARTERY DISEASE: ICD-10-CM

## 2023-07-14 DIAGNOSIS — R07.89 CHEST TIGHTNESS: ICD-10-CM

## 2023-07-14 DIAGNOSIS — N18.31 STAGE 3A CHRONIC KIDNEY DISEASE (HCC): ICD-10-CM

## 2023-07-14 PROCEDURE — G8399 PT W/DXA RESULTS DOCUMENT: HCPCS | Performed by: INTERNAL MEDICINE

## 2023-07-14 PROCEDURE — 3017F COLORECTAL CA SCREEN DOC REV: CPT | Performed by: INTERNAL MEDICINE

## 2023-07-14 PROCEDURE — 1090F PRES/ABSN URINE INCON ASSESS: CPT | Performed by: INTERNAL MEDICINE

## 2023-07-14 PROCEDURE — G8420 CALC BMI NORM PARAMETERS: HCPCS | Performed by: INTERNAL MEDICINE

## 2023-07-14 PROCEDURE — 1036F TOBACCO NON-USER: CPT | Performed by: INTERNAL MEDICINE

## 2023-07-14 PROCEDURE — 99214 OFFICE O/P EST MOD 30 MIN: CPT | Performed by: INTERNAL MEDICINE

## 2023-07-14 PROCEDURE — 1123F ACP DISCUSS/DSCN MKR DOCD: CPT | Performed by: INTERNAL MEDICINE

## 2023-07-14 PROCEDURE — G8427 DOCREV CUR MEDS BY ELIG CLIN: HCPCS | Performed by: INTERNAL MEDICINE

## 2023-07-14 ASSESSMENT — ENCOUNTER SYMPTOMS: CHEST TIGHTNESS: 1

## 2023-07-27 ENCOUNTER — HOSPITAL ENCOUNTER (OUTPATIENT)
Dept: MAMMOGRAPHY | Age: 73
Discharge: HOME | End: 2023-07-27
Payer: MEDICARE

## 2023-07-27 VITALS — WEIGHT: 129 LBS | BODY MASS INDEX: 22.02 KG/M2 | HEIGHT: 64 IN

## 2023-07-27 DIAGNOSIS — Z12.31 SCREENING MAMMOGRAM FOR BREAST CANCER: ICD-10-CM

## 2023-07-27 PROCEDURE — 77067 SCR MAMMO BI INCL CAD: CPT

## 2023-08-03 ENCOUNTER — OFFICE VISIT (OUTPATIENT)
Dept: INTERNAL MEDICINE CLINIC | Facility: CLINIC | Age: 73
End: 2023-08-03
Payer: MEDICARE

## 2023-08-03 VITALS
HEIGHT: 64 IN | HEART RATE: 83 BPM | OXYGEN SATURATION: 97 % | WEIGHT: 132 LBS | TEMPERATURE: 97.4 F | DIASTOLIC BLOOD PRESSURE: 70 MMHG | SYSTOLIC BLOOD PRESSURE: 120 MMHG | BODY MASS INDEX: 22.53 KG/M2

## 2023-08-03 DIAGNOSIS — M85.851 OSTEOPENIA OF BOTH HIPS: ICD-10-CM

## 2023-08-03 DIAGNOSIS — K21.9 GASTROESOPHAGEAL REFLUX DISEASE WITHOUT ESOPHAGITIS: ICD-10-CM

## 2023-08-03 DIAGNOSIS — M89.9 BONE DISORDER: ICD-10-CM

## 2023-08-03 DIAGNOSIS — F33.1 MODERATE RECURRENT MAJOR DEPRESSION (HCC): ICD-10-CM

## 2023-08-03 DIAGNOSIS — F51.04 CHRONIC INSOMNIA: ICD-10-CM

## 2023-08-03 DIAGNOSIS — M85.852 OSTEOPENIA OF BOTH HIPS: ICD-10-CM

## 2023-08-03 DIAGNOSIS — Z12.31 SCREENING MAMMOGRAM FOR BREAST CANCER: ICD-10-CM

## 2023-08-03 DIAGNOSIS — Z00.00 MEDICARE ANNUAL WELLNESS VISIT, SUBSEQUENT: Primary | ICD-10-CM

## 2023-08-03 DIAGNOSIS — F43.9 SITUATIONAL STRESS: ICD-10-CM

## 2023-08-03 PROCEDURE — G8399 PT W/DXA RESULTS DOCUMENT: HCPCS | Performed by: PHYSICIAN ASSISTANT

## 2023-08-03 PROCEDURE — 1123F ACP DISCUSS/DSCN MKR DOCD: CPT | Performed by: PHYSICIAN ASSISTANT

## 2023-08-03 PROCEDURE — G0439 PPPS, SUBSEQ VISIT: HCPCS | Performed by: PHYSICIAN ASSISTANT

## 2023-08-03 PROCEDURE — G8420 CALC BMI NORM PARAMETERS: HCPCS | Performed by: PHYSICIAN ASSISTANT

## 2023-08-03 PROCEDURE — G8427 DOCREV CUR MEDS BY ELIG CLIN: HCPCS | Performed by: PHYSICIAN ASSISTANT

## 2023-08-03 PROCEDURE — 99213 OFFICE O/P EST LOW 20 MIN: CPT | Performed by: PHYSICIAN ASSISTANT

## 2023-08-03 PROCEDURE — 1036F TOBACCO NON-USER: CPT | Performed by: PHYSICIAN ASSISTANT

## 2023-08-03 PROCEDURE — 3017F COLORECTAL CA SCREEN DOC REV: CPT | Performed by: PHYSICIAN ASSISTANT

## 2023-08-03 PROCEDURE — 1090F PRES/ABSN URINE INCON ASSESS: CPT | Performed by: PHYSICIAN ASSISTANT

## 2023-08-03 RX ORDER — CLONAZEPAM 1 MG/1
TABLET ORAL
Qty: 75 TABLET | Refills: 5 | Status: SHIPPED | OUTPATIENT
Start: 2023-08-25 | End: 2023-12-13

## 2023-08-03 RX ORDER — VILAZODONE HYDROCHLORIDE 20 MG/1
20 TABLET ORAL DAILY
Qty: 90 TABLET | Refills: 3 | Status: CANCELLED | OUTPATIENT
Start: 2023-08-03

## 2023-08-03 ASSESSMENT — PATIENT HEALTH QUESTIONNAIRE - PHQ9
SUM OF ALL RESPONSES TO PHQ QUESTIONS 1-9: 2
4. FEELING TIRED OR HAVING LITTLE ENERGY: 1
SUM OF ALL RESPONSES TO PHQ QUESTIONS 1-9: 2
8. MOVING OR SPEAKING SO SLOWLY THAT OTHER PEOPLE COULD HAVE NOTICED. OR THE OPPOSITE, BEING SO FIGETY OR RESTLESS THAT YOU HAVE BEEN MOVING AROUND A LOT MORE THAN USUAL: 0
6. FEELING BAD ABOUT YOURSELF - OR THAT YOU ARE A FAILURE OR HAVE LET YOURSELF OR YOUR FAMILY DOWN: 0
3. TROUBLE FALLING OR STAYING ASLEEP: 0
1. LITTLE INTEREST OR PLEASURE IN DOING THINGS: 0
SUM OF ALL RESPONSES TO PHQ9 QUESTIONS 1 & 2: 1
2. FEELING DOWN, DEPRESSED OR HOPELESS: 1
10. IF YOU CHECKED OFF ANY PROBLEMS, HOW DIFFICULT HAVE THESE PROBLEMS MADE IT FOR YOU TO DO YOUR WORK, TAKE CARE OF THINGS AT HOME, OR GET ALONG WITH OTHER PEOPLE: 0
SUM OF ALL RESPONSES TO PHQ QUESTIONS 1-9: 2
SUM OF ALL RESPONSES TO PHQ QUESTIONS 1-9: 2
5. POOR APPETITE OR OVEREATING: 0
9. THOUGHTS THAT YOU WOULD BE BETTER OFF DEAD, OR OF HURTING YOURSELF: 0
7. TROUBLE CONCENTRATING ON THINGS, SUCH AS READING THE NEWSPAPER OR WATCHING TELEVISION: 0

## 2023-08-03 NOTE — PATIENT INSTRUCTIONS
Reassured that it's ok if she needs to take 1/2-1 Clonazepam every morning through this acutely stressful time with 's upcoming prostate biopsy  Praised her dietary efforts and urged to utilize late evenings to get some exercise in  The SCRIPTS database was verified to have no suspicious activity before the issuance of a controlled substance prescription  Encouraged monthly self breast exams  Continue chronic medications as prescribed  Recommend that she proceed with Lamictal as psych NP prescribed but plan to wait until ~ 1 week after 's biopsy so she is not worrying so much about the potential complications that could occur     A Healthy Heart: Care Instructions  Your Care Instructions     Coronary artery disease, also called heart disease, occurs when a substance called plaque builds up in the vessels that supply oxygen-rich blood to your heart muscle. This can narrow the blood vessels and reduce blood flow. A heart attack happens when blood flow is completely blocked. A high-fat diet, smoking, and other factors increase the risk of heart disease. Your doctor has found that you have a chance of having heart disease. You can do lots of things to keep your heart healthy. It may not be easy, but you can change your diet, exercise more, and quit smoking. These steps really work to lower your chance of heart disease. Follow-up care is a key part of your treatment and safety. Be sure to make and go to all appointments, and call your doctor if you are having problems. It's also a good idea to know your test results and keep a list of the medicines you take. How can you care for yourself at home? Diet    Use less salt when you cook and eat. This helps lower your blood pressure. Taste food before salting. Add only a little salt when you think you need it. With time, your taste buds will adjust to less salt.      Eat fewer snack items, fast foods, canned soups, and other high-salt, high-fat, processed

## 2023-08-03 NOTE — PROGRESS NOTES
nightly as needed for Indigestion  Patient not taking: Reported on 7/14/2023  Historical Provider, MD   L-Methylfolate-Algae (DEPLIN 15) 15-90.314 MG CAPS Take 15 mg by mouth daily  Agatha Lundberg PA-C   clonazePAM (KLONOPIN) 1 MG tablet Take 1/2-1 tablet po q AM as needed for anxiety & 1.5 tabets po q hs for sleep. Agatha Lundberg PA-C   famotidine (PEPCID) 40 MG tablet Take 1 tablet by mouth 2 times daily  Agatha Lundberg PA-C   denosumab (PROLIA) 60 MG/ML SOSY SC injection Inject 1 mL into the skin every 6 months  Agatha Lundberg PA-C   tretinoin (RETIN-A) 0.025 % cream APPLY A THIN LAYER TO FACE DAILY *OUT OF POCKET EXPENSE, NO PA*  Historical Provider, MD   fluorouracil (EFUDEX) 5 % cream APPLY ON FACE, ARMS, AND CHEST X 2 WEEKS, STOP X 2 WEEKS, THEN REAPPLY AT BED X 2 WEEKS  Historical Provider, MD   nitroGLYCERIN (NITROSTAT) 0.4 MG SL tablet Place 1 SL under the tongue q 5 min prn cp, max 3 SL in a 15-min time period. Call 911 if no relief after the 1st SL. Alejandrina Marinelli MD   docusate (COLACE, DULCOLAX) 100 MG CAPS Take 100 mg by mouth in the morning.   Historical Provider, MD   aspirin 81 MG EC tablet Take 1 tablet by mouth  Ar Automatic Reconciliation   Cetirizine HCl 10 MG CAPS Take 1 capsule by mouth  Ar Automatic Reconciliation   fluticasone (FLONASE) 50 MCG/ACT nasal spray 2 sprays by Nasal route daily  Ar Automatic Reconciliation   polyethylene glycol (GLYCOLAX) 17 GM/SCOOP powder Take 17 g by mouth daily  Ar Automatic Reconciliation       CareTeam (Including outside providers/suppliers regularly involved in providing care):   Patient Care Team:  Arnaldo Lopez PA-C as PCP - General (Physician Assistant)  Arnaldo Lopez PA-C as PCP - Empaneled Provider  Jarrett Sauer MD as Physician  Jodie Tong MD as Physician  Alejandrina Marinelli MD as Consulting Physician (Cardiology)  Lissy Swain MD (Ophthalmology)     Reviewed and updated this visit:  Tobacco  Allergies

## 2023-08-29 DIAGNOSIS — F51.04 CHRONIC INSOMNIA: ICD-10-CM

## 2023-08-29 DIAGNOSIS — F43.9 SITUATIONAL STRESS: ICD-10-CM

## 2023-08-29 RX ORDER — CLONAZEPAM 1 MG/1
TABLET ORAL
Qty: 75 TABLET | OUTPATIENT
Start: 2023-08-29

## 2023-10-02 ENCOUNTER — HOSPITAL ENCOUNTER (OUTPATIENT)
Dept: LAB | Age: 73
Discharge: HOME | End: 2023-10-05

## 2023-10-02 DIAGNOSIS — N18.31 STAGE 3A CHRONIC KIDNEY DISEASE (HCC): ICD-10-CM

## 2023-10-02 DIAGNOSIS — R73.01 ELEVATED FASTING GLUCOSE: ICD-10-CM

## 2023-10-02 DIAGNOSIS — D69.6 THROMBOCYTOPENIA (HCC): ICD-10-CM

## 2023-10-02 DIAGNOSIS — E55.9 VITAMIN D DEFICIENCY: ICD-10-CM

## 2023-10-02 DIAGNOSIS — E78.2 MIXED HYPERLIPIDEMIA: ICD-10-CM

## 2023-10-02 DIAGNOSIS — E03.9 ACQUIRED HYPOTHYROIDISM: ICD-10-CM

## 2023-10-02 LAB
25(OH)D3 SERPL-MCNC: 44 NG/ML (ref 30–100)
ALBUMIN SERPL-MCNC: 3.8 G/DL (ref 3.2–4.6)
ALBUMIN/GLOB SERPL: 1.4 (ref 0.4–1.6)
ALP SERPL-CCNC: 67 U/L (ref 50–136)
ALT SERPL-CCNC: 32 U/L (ref 12–65)
ANION GAP SERPL CALC-SCNC: 5 MMOL/L (ref 2–11)
AST SERPL-CCNC: 20 U/L (ref 15–37)
BASOPHILS # BLD: 0.1 K/UL (ref 0–0.2)
BASOPHILS NFR BLD: 2 % (ref 0–2)
BILIRUB SERPL-MCNC: 1 MG/DL (ref 0.2–1.1)
BUN SERPL-MCNC: 20 MG/DL (ref 8–23)
CALCIUM SERPL-MCNC: 8.6 MG/DL (ref 8.3–10.4)
CHLORIDE SERPL-SCNC: 113 MMOL/L (ref 101–110)
CHOLEST SERPL-MCNC: 196 MG/DL
CO2 SERPL-SCNC: 24 MMOL/L (ref 21–32)
CREAT SERPL-MCNC: 1.1 MG/DL (ref 0.6–1)
DIFFERENTIAL METHOD BLD: ABNORMAL
EOSINOPHIL # BLD: 0.1 K/UL (ref 0–0.8)
EOSINOPHIL NFR BLD: 2 % (ref 0.5–7.8)
ERYTHROCYTE [DISTWIDTH] IN BLOOD BY AUTOMATED COUNT: 14.6 % (ref 11.9–14.6)
EST. AVERAGE GLUCOSE BLD GHB EST-MCNC: 108 MG/DL
GLOBULIN SER CALC-MCNC: 2.8 G/DL (ref 2.8–4.5)
GLUCOSE SERPL-MCNC: 107 MG/DL (ref 65–100)
HBA1C MFR BLD: 5.4 % (ref 4.8–5.6)
HCT VFR BLD AUTO: 36.9 % (ref 35.8–46.3)
HDLC SERPL-MCNC: 41 MG/DL (ref 40–60)
HDLC SERPL: 4.8
HGB BLD-MCNC: 12.8 G/DL (ref 11.7–15.4)
IMM GRANULOCYTES # BLD AUTO: 0 K/UL (ref 0–0.5)
IMM GRANULOCYTES NFR BLD AUTO: 0 % (ref 0–5)
LDLC SERPL CALC-MCNC: 119.8 MG/DL
LYMPHOCYTES # BLD: 2.1 K/UL (ref 0.5–4.6)
LYMPHOCYTES NFR BLD: 40 % (ref 13–44)
MCH RBC QN AUTO: 34.1 PG (ref 26.1–32.9)
MCHC RBC AUTO-ENTMCNC: 34.7 G/DL (ref 31.4–35)
MCV RBC AUTO: 98.4 FL (ref 82–102)
MONOCYTES # BLD: 0.6 K/UL (ref 0.1–1.3)
MONOCYTES NFR BLD: 11 % (ref 4–12)
NEUTS SEG # BLD: 2.5 K/UL (ref 1.7–8.2)
NEUTS SEG NFR BLD: 45 % (ref 43–78)
NRBC # BLD: 0 K/UL (ref 0–0.2)
PLATELET # BLD AUTO: 148 K/UL (ref 150–450)
PMV BLD AUTO: 12.6 FL (ref 9.4–12.3)
POTASSIUM SERPL-SCNC: 4.2 MMOL/L (ref 3.5–5.1)
PROT SERPL-MCNC: 6.6 G/DL (ref 6.3–8.2)
RBC # BLD AUTO: 3.75 M/UL (ref 4.05–5.2)
SODIUM SERPL-SCNC: 142 MMOL/L (ref 133–143)
TRIGL SERPL-MCNC: 176 MG/DL (ref 35–150)
TSH, 3RD GENERATION: 3.24 UIU/ML (ref 0.36–3.74)
VLDLC SERPL CALC-MCNC: 35.2 MG/DL (ref 6–23)
WBC # BLD AUTO: 5.3 K/UL (ref 4.3–11.1)

## 2023-10-04 RX ORDER — FAMOTIDINE 40 MG/1
40 TABLET, FILM COATED ORAL 2 TIMES DAILY
Qty: 180 TABLET | Refills: 3 | Status: CANCELLED | OUTPATIENT
Start: 2023-10-04

## 2023-10-04 ASSESSMENT — ENCOUNTER SYMPTOMS
CONSTIPATION: 0
SHORTNESS OF BREATH: 0
DIARRHEA: 0

## 2023-10-04 NOTE — PROGRESS NOTES
equal, round, and reactive to light. Neck:      Vascular: No carotid bruit. Cardiovascular:      Rate and Rhythm: Normal rate and regular rhythm. Heart sounds: Normal heart sounds. Pulmonary:      Effort: Pulmonary effort is normal.      Breath sounds: Normal breath sounds. Musculoskeletal:         General: Normal range of motion. Cervical back: Normal range of motion. Right lower leg: No edema. Left lower leg: No edema. Skin:     General: Skin is warm and dry. Findings: Petechiae present. Neurological:      Mental Status: She is alert and oriented to person, place, and time. Psychiatric:         Mood and Affect: Mood normal.         Behavior: Behavior normal.         Thought Content: Thought content normal.         Judgment: Judgment normal.            On this date 10/9/2023 I have spent 55 minutes reviewing previous notes, test results and face to face with the patient discussing the diagnosis and importance of compliance with the treatment plan as well as documenting on the day of the visit. An electronic signature was used to authenticate this note.     --Gayle Ho PA-C

## 2023-10-09 ENCOUNTER — OFFICE VISIT (OUTPATIENT)
Dept: INTERNAL MEDICINE CLINIC | Facility: CLINIC | Age: 73
End: 2023-10-09
Payer: MEDICARE

## 2023-10-09 VITALS
HEART RATE: 98 BPM | TEMPERATURE: 97 F | SYSTOLIC BLOOD PRESSURE: 130 MMHG | BODY MASS INDEX: 22.74 KG/M2 | OXYGEN SATURATION: 96 % | HEIGHT: 64 IN | WEIGHT: 133.2 LBS | DIASTOLIC BLOOD PRESSURE: 72 MMHG

## 2023-10-09 DIAGNOSIS — Z23 NEED FOR IMMUNIZATION AGAINST INFLUENZA: ICD-10-CM

## 2023-10-09 DIAGNOSIS — E55.9 VITAMIN D DEFICIENCY: ICD-10-CM

## 2023-10-09 DIAGNOSIS — E03.9 ACQUIRED HYPOTHYROIDISM: ICD-10-CM

## 2023-10-09 DIAGNOSIS — M54.2 CERVICALGIA: ICD-10-CM

## 2023-10-09 DIAGNOSIS — F33.1 MODERATE RECURRENT MAJOR DEPRESSION (HCC): ICD-10-CM
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
## 2023-10-09 DIAGNOSIS — R73.01 ELEVATED FASTING GLUCOSE: ICD-10-CM

## 2023-10-09 DIAGNOSIS — D69.6 THROMBOCYTOPENIA (HCC): ICD-10-CM

## 2023-10-09 DIAGNOSIS — E78.2 MIXED HYPERLIPIDEMIA: Primary | ICD-10-CM

## 2023-10-09 DIAGNOSIS — N18.31 STAGE 3A CHRONIC KIDNEY DISEASE (HCC): ICD-10-CM

## 2023-10-09 PROCEDURE — 1090F PRES/ABSN URINE INCON ASSESS: CPT | Performed by: PHYSICIAN ASSISTANT

## 2023-10-09 PROCEDURE — G8399 PT W/DXA RESULTS DOCUMENT: HCPCS | Performed by: PHYSICIAN ASSISTANT

## 2023-10-09 PROCEDURE — 3017F COLORECTAL CA SCREEN DOC REV: CPT | Performed by: PHYSICIAN ASSISTANT

## 2023-10-09 PROCEDURE — 90694 VACC AIIV4 NO PRSRV 0.5ML IM: CPT | Performed by: PHYSICIAN ASSISTANT

## 2023-10-09 PROCEDURE — 1123F ACP DISCUSS/DSCN MKR DOCD: CPT | Performed by: PHYSICIAN ASSISTANT

## 2023-10-09 PROCEDURE — 1036F TOBACCO NON-USER: CPT | Performed by: PHYSICIAN ASSISTANT

## 2023-10-09 PROCEDURE — G0008 ADMIN INFLUENZA VIRUS VAC: HCPCS | Performed by: PHYSICIAN ASSISTANT

## 2023-10-09 PROCEDURE — 99215 OFFICE O/P EST HI 40 MIN: CPT | Performed by: PHYSICIAN ASSISTANT

## 2023-10-09 PROCEDURE — G8428 CUR MEDS NOT DOCUMENT: HCPCS | Performed by: PHYSICIAN ASSISTANT

## 2023-10-09 PROCEDURE — G8484 FLU IMMUNIZE NO ADMIN: HCPCS | Performed by: PHYSICIAN ASSISTANT

## 2023-10-09 PROCEDURE — G8420 CALC BMI NORM PARAMETERS: HCPCS | Performed by: PHYSICIAN ASSISTANT

## 2023-10-09 RX ORDER — LEVOMEFOLATE/ALGAL OIL 15-90.314
15 CAPSULE ORAL DAILY
Qty: 90 CAPSULE | Refills: 3 | Status: SHIPPED | OUTPATIENT
Start: 2023-10-09

## 2023-10-09 ASSESSMENT — PATIENT HEALTH QUESTIONNAIRE - PHQ9
SUM OF ALL RESPONSES TO PHQ QUESTIONS 1-9: 0
1. LITTLE INTEREST OR PLEASURE IN DOING THINGS: 0
SUM OF ALL RESPONSES TO PHQ QUESTIONS 1-9: 0
SUM OF ALL RESPONSES TO PHQ QUESTIONS 1-9: 0
2. FEELING DOWN, DEPRESSED OR HOPELESS: 0
SUM OF ALL RESPONSES TO PHQ9 QUESTIONS 1 & 2: 0
SUM OF ALL RESPONSES TO PHQ QUESTIONS 1-9: 0

## 2023-10-09 NOTE — PATIENT INSTRUCTIONS
Suggest 30 day trial of B-Complex vitamin for energy and memory concerns  Praised her progress in making healthier dietary choices and exercising  The SCRIPTS database was verified to have no suspicious activity before the issuance of a controlled substance prescription  Reminded to stay well hydrated with plenty of water  Recommend avoidance of NSAIDs for now which includes Advil, Ibuprofen, Aleve, Naproxen, Aspirin  Continue chronic medications as prescribed      Patient Education        influenza virus vaccine (injection)  Pronunciation:  IN floo EN za LEXIE PEREZK seen  Brand:  Afluria PF Pediatric Quadrivalent [id number], Afluria PF Quadrivalent [id number], Afluria Quadrivalent [id number], Fluarix PF Quadrivalent [id number], Flublok Quadrivalent [id number], Flucelvax PF Quadrivalent [id number], Flucelvax Quadrivalent [id number], FluLaval PF Quadrivalent [id number], Fluzone High-Dose Quadrivalent PF [id number], Fluzone PF Pediatric Quadrivalent [id number], Fluzone PF Quadrivalent [id number], Fluzone Quadrivalent [id number]  What is the most important information I should know about this vaccine? Influenza virus vaccine is made from \"killed viruses\" and will not cause you to become ill with the flu virus. What is influenza virus vaccine? Influenza virus (\"the flu\") is a contagious disease caused by a virus that can spread from one person to another through the air or on surfaces. Flu symptoms include fever, chills, tiredness, aches, sore throat, cough, vomiting, and diarrhea. The flu can also cause sinus infections, ear infections, bronchitis, or serious complications such as pneumonia. Influenza causes thousands of deaths each year, and hundreds of thousands of hospitalizations. Influenza is most dangerous in children, pregnant women, older adults, and people with weak immune systems or health problems such as diabetes, heart disease, or cancer.   Influenza virus vaccine is for use in adults and children at least 6

## 2023-10-10 ENCOUNTER — HOSPITAL ENCOUNTER (OUTPATIENT)
Dept: GENERAL RADIOLOGY | Age: 73
Discharge: HOME | End: 2023-10-13
Payer: MEDICARE

## 2023-10-10 DIAGNOSIS — M54.2 CERVICALGIA: ICD-10-CM

## 2023-10-10 PROCEDURE — 72040 X-RAY EXAM NECK SPINE 2-3 VW: CPT

## 2023-10-10 NOTE — RESULT ENCOUNTER NOTE
X-ray shows mild degenerative changes at C5-6 with some facet (area where vertebrae are attached) changes in the upper cervical spine but no fractures or malalignment. I did the referral to physical therapy so she can proceed with that.   Please forward this x-ray report to [address] [place] [address] [address] [phone] (fax)

## 2023-11-26 DIAGNOSIS — K21.9 GASTROESOPHAGEAL REFLUX DISEASE WITHOUT ESOPHAGITIS: ICD-10-CM

## 2023-11-27 RX ORDER — FAMOTIDINE 40 MG/1
40 TABLET, FILM COATED ORAL 2 TIMES DAILY
Qty: 180 TABLET | Refills: 3 | OUTPATIENT
Start: 2023-11-27

## 2024-01-25 DIAGNOSIS — E78.2 MIXED HYPERLIPIDEMIA: ICD-10-CM

## 2024-01-25 DIAGNOSIS — R73.01 ELEVATED FASTING GLUCOSE: ICD-10-CM

## 2024-01-25 DIAGNOSIS — D69.6 THROMBOCYTOPENIA (HCC): ICD-10-CM

## 2024-01-25 DIAGNOSIS — E03.9 ACQUIRED HYPOTHYROIDISM: ICD-10-CM

## 2024-01-25 DIAGNOSIS — N18.31 STAGE 3A CHRONIC KIDNEY DISEASE (HCC): ICD-10-CM

## 2024-01-25 LAB
ALBUMIN SERPL-MCNC: 4.1 G/DL (ref 3.2–4.6)
ALBUMIN/GLOB SERPL: 1.3 (ref 0.4–1.6)
ALP SERPL-CCNC: 71 U/L (ref 50–136)
ALT SERPL-CCNC: 29 U/L (ref 12–65)
ANION GAP SERPL CALC-SCNC: 4 MMOL/L (ref 2–11)
AST SERPL-CCNC: 25 U/L (ref 15–37)
BASOPHILS # BLD: 0.1 K/UL (ref 0–0.2)
BASOPHILS NFR BLD: 1 % (ref 0–2)
BILIRUB SERPL-MCNC: 1.1 MG/DL (ref 0.2–1.1)
BUN SERPL-MCNC: 20 MG/DL (ref 8–23)
CALCIUM SERPL-MCNC: 9.5 MG/DL (ref 8.3–10.4)
CHLORIDE SERPL-SCNC: 111 MMOL/L (ref 103–113)
CHOLEST SERPL-MCNC: 225 MG/DL
CO2 SERPL-SCNC: 26 MMOL/L (ref 21–32)
CREAT SERPL-MCNC: 1.2 MG/DL (ref 0.6–1)
DIFFERENTIAL METHOD BLD: ABNORMAL
EOSINOPHIL # BLD: 0.1 K/UL (ref 0–0.8)
EOSINOPHIL NFR BLD: 2 % (ref 0.5–7.8)
ERYTHROCYTE [DISTWIDTH] IN BLOOD BY AUTOMATED COUNT: 14.8 % (ref 11.9–14.6)
EST. AVERAGE GLUCOSE BLD GHB EST-MCNC: 105 MG/DL
GLOBULIN SER CALC-MCNC: 3.1 G/DL (ref 2.8–4.5)
GLUCOSE SERPL-MCNC: 104 MG/DL (ref 65–100)
HBA1C MFR BLD: 5.3 % (ref 4.8–5.6)
HCT VFR BLD AUTO: 37.7 % (ref 35.8–46.3)
HDLC SERPL-MCNC: 45 MG/DL (ref 40–60)
HDLC SERPL: 5
HGB BLD-MCNC: 13.2 G/DL (ref 11.7–15.4)
IMM GRANULOCYTES # BLD AUTO: 0 K/UL (ref 0–0.5)
IMM GRANULOCYTES NFR BLD AUTO: 0 % (ref 0–5)
LDLC SERPL CALC-MCNC: 152 MG/DL
LYMPHOCYTES # BLD: 2.8 K/UL (ref 0.5–4.6)
LYMPHOCYTES NFR BLD: 41 % (ref 13–44)
MCH RBC QN AUTO: 34.4 PG (ref 26.1–32.9)
MCHC RBC AUTO-ENTMCNC: 35 G/DL (ref 31.4–35)
MCV RBC AUTO: 98.2 FL (ref 82–102)
MONOCYTES # BLD: 0.7 K/UL (ref 0.1–1.3)
MONOCYTES NFR BLD: 10 % (ref 4–12)
NEUTS SEG # BLD: 3.2 K/UL (ref 1.7–8.2)
NEUTS SEG NFR BLD: 46 % (ref 43–78)
NRBC # BLD: 0 K/UL (ref 0–0.2)
PLATELET # BLD AUTO: 144 K/UL (ref 150–450)
PMV BLD AUTO: 12.6 FL (ref 9.4–12.3)
POTASSIUM SERPL-SCNC: 4 MMOL/L (ref 3.5–5.1)
PROT SERPL-MCNC: 7.2 G/DL (ref 6.3–8.2)
RBC # BLD AUTO: 3.84 M/UL (ref 4.05–5.2)
SODIUM SERPL-SCNC: 141 MMOL/L (ref 136–146)
TRIGL SERPL-MCNC: 140 MG/DL (ref 35–150)
TSH, 3RD GENERATION: 2.17 UIU/ML (ref 0.36–3.74)
VLDLC SERPL CALC-MCNC: 28 MG/DL (ref 6–23)
WBC # BLD AUTO: 6.9 K/UL (ref 4.3–11.1)

## 2024-01-26 LAB — PATH REV BLD -IMP: NORMAL

## 2024-01-26 NOTE — PROGRESS NOTES
09/09/2021     Lab Results   Component Value Date    CHOLHDLRATIO 5.0 01/25/2024    CHOLHDLRATIO 4.8 10/02/2023    CHOLHDLRATIO 4.6 06/19/2023       Patient is here for follow-up of elevated fasting glucose.  The current state of this condition is asymptomatic and stable - not currently on medications for this problem.  She denies any specific dietary changes but has been doing more exercise.    Hemoglobin A1C   Date Value Ref Range Status   01/25/2024 5.3 4.8 - 5.6 % Final       Patient is here for follow-up of chronic kidney disease.  The current state of this condition is asymptomatic and poorly controlled - not currently on medications for this problem.  She has been making efforts to drink more water with the exception of her recent cruise from Jan 7-19.    Lab Results   Component Value Date    CREATININE 1.20 (H) 01/25/2024    BUN 20 01/25/2024     01/25/2024    K 4.0 01/25/2024     01/25/2024    CO2 26 01/25/2024       The patient is a 73 y.o. female who is seen for follow up of hypothyroidism. Current symptoms: none. Symptoms are symptoms have progressed to a point and plateaued. The patient is following treatment regimen with good compliance.  Current treatment regimen consists of Synthroid 50 mcg daily and is  taking it first thing in the AM on an empty stomach with no other food/liquids/other medications for at least 45-60 minutes.    Lab Results   Component Value Date    JOZ4ADZ 2.170 01/25/2024    THF9IXC 3.240 10/02/2023    YZS1VYY 2.340 06/19/2023    TSH 4.570 (H) 04/08/2022    TSH 3.170 01/04/2022    TSH 2.600 09/09/2021     No results found for: \"T4FREE\"  No results found for: \"TGAB\"      Patient is here for follow-up of thrombocytopenia.  The current state of this condition is asymptomatic and stable - not currently on medications for this problem.  Pathology reported \"RBC decreased in number but morphologically unremarkable, WBC morphologically unremarkable, platelets decreased in

## 2024-01-29 ENCOUNTER — OFFICE VISIT (OUTPATIENT)
Dept: INTERNAL MEDICINE CLINIC | Facility: CLINIC | Age: 74
End: 2024-01-29
Payer: MEDICARE

## 2024-01-29 VITALS
DIASTOLIC BLOOD PRESSURE: 80 MMHG | HEART RATE: 83 BPM | SYSTOLIC BLOOD PRESSURE: 130 MMHG | OXYGEN SATURATION: 97 % | WEIGHT: 134 LBS | BODY MASS INDEX: 22.88 KG/M2 | TEMPERATURE: 97.9 F | HEIGHT: 64 IN

## 2024-01-29 DIAGNOSIS — D69.6 THROMBOCYTOPENIA (HCC): Primary | ICD-10-CM

## 2024-01-29 DIAGNOSIS — M85.852 OSTEOPENIA OF BOTH HIPS: ICD-10-CM

## 2024-01-29 DIAGNOSIS — F33.1 MODERATE RECURRENT MAJOR DEPRESSION (HCC): ICD-10-CM

## 2024-01-29 DIAGNOSIS — E03.9 ACQUIRED HYPOTHYROIDISM: ICD-10-CM

## 2024-01-29 DIAGNOSIS — E78.2 MIXED HYPERLIPIDEMIA: ICD-10-CM

## 2024-01-29 DIAGNOSIS — N18.31 STAGE 3A CHRONIC KIDNEY DISEASE (HCC): ICD-10-CM

## 2024-01-29 DIAGNOSIS — M85.851 OSTEOPENIA OF BOTH HIPS: ICD-10-CM

## 2024-01-29 DIAGNOSIS — M54.2 CERVICALGIA: ICD-10-CM

## 2024-01-29 DIAGNOSIS — M50.30 DEGENERATIVE DISC DISEASE, CERVICAL: ICD-10-CM

## 2024-01-29 DIAGNOSIS — R73.01 ELEVATED FASTING GLUCOSE: ICD-10-CM

## 2024-01-29 DIAGNOSIS — F43.9 SITUATIONAL STRESS: ICD-10-CM

## 2024-01-29 DIAGNOSIS — F51.04 CHRONIC INSOMNIA: ICD-10-CM

## 2024-01-29 DIAGNOSIS — E55.9 VITAMIN D DEFICIENCY: Primary | ICD-10-CM

## 2024-01-29 DIAGNOSIS — K21.9 GASTROESOPHAGEAL REFLUX DISEASE WITHOUT ESOPHAGITIS: ICD-10-CM

## 2024-01-29 PROCEDURE — 1123F ACP DISCUSS/DSCN MKR DOCD: CPT | Performed by: PHYSICIAN ASSISTANT

## 2024-01-29 PROCEDURE — 1090F PRES/ABSN URINE INCON ASSESS: CPT | Performed by: PHYSICIAN ASSISTANT

## 2024-01-29 PROCEDURE — G8420 CALC BMI NORM PARAMETERS: HCPCS | Performed by: PHYSICIAN ASSISTANT

## 2024-01-29 PROCEDURE — G8427 DOCREV CUR MEDS BY ELIG CLIN: HCPCS | Performed by: PHYSICIAN ASSISTANT

## 2024-01-29 PROCEDURE — G8484 FLU IMMUNIZE NO ADMIN: HCPCS | Performed by: PHYSICIAN ASSISTANT

## 2024-01-29 PROCEDURE — 99215 OFFICE O/P EST HI 40 MIN: CPT | Performed by: PHYSICIAN ASSISTANT

## 2024-01-29 PROCEDURE — 1036F TOBACCO NON-USER: CPT | Performed by: PHYSICIAN ASSISTANT

## 2024-01-29 PROCEDURE — G8399 PT W/DXA RESULTS DOCUMENT: HCPCS | Performed by: PHYSICIAN ASSISTANT

## 2024-01-29 PROCEDURE — 3017F COLORECTAL CA SCREEN DOC REV: CPT | Performed by: PHYSICIAN ASSISTANT

## 2024-01-29 RX ORDER — ALBUTEROL SULFATE 90 UG/1
4 AEROSOL, METERED RESPIRATORY (INHALATION) PRN
OUTPATIENT
Start: 2024-01-29

## 2024-01-29 RX ORDER — CLONAZEPAM 1 MG/1
TABLET ORAL
Qty: 75 TABLET | Refills: 5 | Status: SHIPPED | OUTPATIENT
Start: 2024-02-19 | End: 2024-05-15

## 2024-01-29 RX ORDER — FAMOTIDINE 40 MG/1
40 TABLET, FILM COATED ORAL 2 TIMES DAILY
Qty: 180 TABLET | Refills: 3 | Status: SHIPPED | OUTPATIENT
Start: 2024-01-29

## 2024-01-29 RX ORDER — DENOSUMAB 60 MG/ML
60 INJECTION SUBCUTANEOUS
Qty: 180 ML | Refills: 1
Start: 2024-01-29

## 2024-01-29 RX ORDER — FAMOTIDINE 10 MG/ML
20 INJECTION, SOLUTION INTRAVENOUS
OUTPATIENT
Start: 2024-01-29

## 2024-01-29 RX ORDER — DIPHENHYDRAMINE HYDROCHLORIDE 50 MG/ML
50 INJECTION INTRAMUSCULAR; INTRAVENOUS
OUTPATIENT
Start: 2024-01-29

## 2024-01-29 RX ORDER — EPINEPHRINE 1 MG/ML
0.3 INJECTION, SOLUTION, CONCENTRATE INTRAVENOUS PRN
OUTPATIENT
Start: 2024-01-29

## 2024-01-29 RX ORDER — SODIUM CHLORIDE 9 MG/ML
INJECTION, SOLUTION INTRAVENOUS CONTINUOUS
OUTPATIENT
Start: 2024-01-29

## 2024-01-29 RX ORDER — ONDANSETRON 2 MG/ML
8 INJECTION INTRAMUSCULAR; INTRAVENOUS
OUTPATIENT
Start: 2024-01-29

## 2024-01-29 RX ORDER — ACETAMINOPHEN 325 MG/1
650 TABLET ORAL
OUTPATIENT
Start: 2024-01-29

## 2024-01-29 NOTE — PATIENT INSTRUCTIONS
Counseled to reduce intake of high fat/cholesterol foods such as fried foods, red meats, and certain dairy products (cheese, ice cream, butter/margarine, egg yolks, whole milk products) to help reduce cholesterol values  Discussed and praised any changes/improvements she's made in sugar & carbohydrate consumption with reduced A1c & triglycerides on recent blood work  Deferred options for reflux management to GI and asked her to schedule a visit with them to discuss recent changes in her voice - may warrant an updated EGD  Encouraged discussion with GI on potential benefit of adding Carafate given improvement she sees with Maalox  Asked to contact infusion center tomorrow in attempt to get Prolia done before 2/8/24 to avoid having to have new blood work done  Printed outline of platelets + peripheral smear for her to take to dermatologist so he is aware of her chronic mildly low platelet count

## 2024-02-02 ENCOUNTER — NURSE ONLY (OUTPATIENT)
Age: 74
End: 2024-02-02

## 2024-02-02 VITALS
HEART RATE: 71 BPM | DIASTOLIC BLOOD PRESSURE: 65 MMHG | SYSTOLIC BLOOD PRESSURE: 120 MMHG | TEMPERATURE: 97.3 F | OXYGEN SATURATION: 95 %

## 2024-02-02 DIAGNOSIS — M85.851 OSTEOPENIA OF BOTH HIPS: Primary | ICD-10-CM

## 2024-02-02 DIAGNOSIS — M85.852 OSTEOPENIA OF BOTH HIPS: Primary | ICD-10-CM

## 2024-02-02 RX ORDER — SODIUM CHLORIDE 9 MG/ML
INJECTION, SOLUTION INTRAVENOUS CONTINUOUS
OUTPATIENT
Start: 2024-08-02

## 2024-02-02 RX ORDER — DIPHENHYDRAMINE HYDROCHLORIDE 50 MG/ML
50 INJECTION INTRAMUSCULAR; INTRAVENOUS
OUTPATIENT
Start: 2024-08-02

## 2024-02-02 RX ORDER — ALBUTEROL SULFATE 90 UG/1
4 AEROSOL, METERED RESPIRATORY (INHALATION) PRN
OUTPATIENT
Start: 2024-08-02

## 2024-02-02 RX ORDER — ACETAMINOPHEN 325 MG/1
650 TABLET ORAL
OUTPATIENT
Start: 2024-08-02

## 2024-02-02 RX ORDER — ONDANSETRON 2 MG/ML
8 INJECTION INTRAMUSCULAR; INTRAVENOUS
OUTPATIENT
Start: 2024-08-02

## 2024-02-02 RX ORDER — EPINEPHRINE 1 MG/ML
0.3 INJECTION, SOLUTION, CONCENTRATE INTRAVENOUS PRN
OUTPATIENT
Start: 2024-08-02

## 2024-02-02 NOTE — PROGRESS NOTES
[facility]  [address], [address] [place] [address]  Office : [phone], Fax: [phone]         Osteoporosis pre-infusion/injection questionnaire:     1. In the past few months or in the few months, have you had or are you planning to have any dental surgery or major dental procedures?  no     2. Are you taking a calcium and vitamin D supplement? yes     3. When was your last osteoporosis treatment? 6 months ago      4. In the last year, have you had any fractures? no           Pt ambulatory to infusion suite today for  repeat Prolia subcutaneous injection.  Pertinent labs drawn 1/25/24 . Labs reviewed and are within medication administration parameters.  Prolia 60mg/ml injected SC into right upper arm. Patient tolerated injection well.  Advised patient to continue with calcium and vitamin D supplements post injection. Advised patient to call the ordering provider with any problems post injection.       Next Prolia appt scheduled 8/2/24 prior to departure from infusion suite.     Pt ambulatory with steady gait out of infusion suite.

## 2024-03-02 DIAGNOSIS — F43.9 SITUATIONAL STRESS: ICD-10-CM

## 2024-03-02 DIAGNOSIS — F51.04 CHRONIC INSOMNIA: ICD-10-CM

## 2024-03-04 ENCOUNTER — TELEPHONE (OUTPATIENT)
Dept: INTERNAL MEDICINE CLINIC | Facility: CLINIC | Age: 74
End: 2024-03-04

## 2024-03-04 RX ORDER — CLONAZEPAM 1 MG/1
TABLET ORAL
Qty: 75 TABLET | OUTPATIENT
Start: 2024-03-04

## 2024-03-04 NOTE — TELEPHONE ENCOUNTER
Pt notified that her refills were sent to [facility] on 2/19/24 and to contact her pharmacy to activate new RX for her refills. Pt verbalized understanding.

## 2024-03-04 NOTE — TELEPHONE ENCOUNTER
Pt called, requ refill of clonazepam 1 mg. Pt is currently out of medication.    Sent to [facility] on [address]

## 2024-03-07 ENCOUNTER — TELEPHONE (OUTPATIENT)
Age: 74
End: 2024-03-07

## 2024-03-07 NOTE — TELEPHONE ENCOUNTER
PT has been having active chest pains and bad active reflux. Her Gastro  Dr, Dr. Lovett wants to perform an endoscopy on her soon but wants her to see her cardiologist first. Pt is in active pain and would like to be seen as soon as possible.

## 2024-03-12 ENCOUNTER — OFFICE VISIT (OUTPATIENT)
Age: 74
End: 2024-03-12
Payer: MEDICARE

## 2024-03-12 VITALS
HEIGHT: 64 IN | HEART RATE: 61 BPM | DIASTOLIC BLOOD PRESSURE: 64 MMHG | BODY MASS INDEX: 22.71 KG/M2 | WEIGHT: 133 LBS | SYSTOLIC BLOOD PRESSURE: 122 MMHG

## 2024-03-12 DIAGNOSIS — E03.9 HYPOTHYROIDISM (ACQUIRED): ICD-10-CM

## 2024-03-12 DIAGNOSIS — K21.9 GASTROESOPHAGEAL REFLUX DISEASE WITHOUT ESOPHAGITIS: ICD-10-CM

## 2024-03-12 DIAGNOSIS — E78.2 MIXED HYPERLIPIDEMIA: ICD-10-CM

## 2024-03-12 DIAGNOSIS — R07.89 CHEST TIGHTNESS: ICD-10-CM

## 2024-03-12 DIAGNOSIS — N18.31 STAGE 3A CHRONIC KIDNEY DISEASE (HCC): ICD-10-CM

## 2024-03-12 DIAGNOSIS — I25.10 MILD CORONARY ARTERY DISEASE: Primary | ICD-10-CM

## 2024-03-12 PROCEDURE — G8484 FLU IMMUNIZE NO ADMIN: HCPCS | Performed by: INTERNAL MEDICINE

## 2024-03-12 PROCEDURE — 99214 OFFICE O/P EST MOD 30 MIN: CPT | Performed by: INTERNAL MEDICINE

## 2024-03-12 PROCEDURE — 1123F ACP DISCUSS/DSCN MKR DOCD: CPT | Performed by: INTERNAL MEDICINE

## 2024-03-12 PROCEDURE — 1036F TOBACCO NON-USER: CPT | Performed by: INTERNAL MEDICINE

## 2024-03-12 PROCEDURE — G8399 PT W/DXA RESULTS DOCUMENT: HCPCS | Performed by: INTERNAL MEDICINE

## 2024-03-12 PROCEDURE — G8420 CALC BMI NORM PARAMETERS: HCPCS | Performed by: INTERNAL MEDICINE

## 2024-03-12 PROCEDURE — G8427 DOCREV CUR MEDS BY ELIG CLIN: HCPCS | Performed by: INTERNAL MEDICINE

## 2024-03-12 PROCEDURE — 1090F PRES/ABSN URINE INCON ASSESS: CPT | Performed by: INTERNAL MEDICINE

## 2024-03-12 PROCEDURE — 93000 ELECTROCARDIOGRAM COMPLETE: CPT | Performed by: INTERNAL MEDICINE

## 2024-04-08 ASSESSMENT — ENCOUNTER SYMPTOMS
SORE THROAT: 0
COUGH: 0
DIARRHEA: 0
PHOTOPHOBIA: 0
CONSTIPATION: 0
CHEST TIGHTNESS: 1
SHORTNESS OF BREATH: 0
ABDOMINAL PAIN: 0
ABDOMINAL DISTENTION: 0

## 2024-04-08 NOTE — PROGRESS NOTES
[facility] CARDIOLOGY  [address] [place] [address], [address]  [place] [address]  PHONE: [phone]        NAME:  Anna Lincoln  : 1950  MRN: [id number]     PCP:  Agatha Lundberg PA-C      SUBJECTIVE:   Anna Lincoln is a 74 y.o. female seen for a follow up visit regarding the following:     Chief Complaint   Patient presents with    Coronary Artery Disease    Results     Nuclear stress test/cardiac risk assessment     HPI:    She presented recently to [facility], last seen in the catheterization lab 2019 in the setting of recurrent substernal chest discomfort despite a negative nuclear stress test.    Cardiac cath 2019:  1.  Minimal coronary disease as described above.  2.  Noncardiac chest symptoms most likely.  3.  Normal left ventricular regional wall motion and ejection fraction.  4.  Elevated left ventricular end-diastolic pressure.    Under lots of situational stress recently with recurrent SSCP associated with severely stressful situations.  Having recurrent CP, SOB, DAVIS, and acute diaphoresis with low level stress.   Exam and ECG benign.      Echo 2022:  Left Ventricle Normal left ventricular systolic function with a visually estimated EF of 60 - 65%. Left ventricle size is normal. Normal wall thickness. Normal wall motion. Normal diastolic function.   Left Atrium Left atrium size is normal. LA Vol Index is  27 ml/m2.   Right Ventricle Right ventricle size is normal. Normal wall thickness. RV basal diameter is 3.0 cm. RV mid diameter is 2.1 cm. Normal systolic function.   Right Atrium Right atrium size is normal.   Aortic Valve Valve structure is normal. No regurgitation. No stenosis.   Mitral Valve Valve structure is normal. Trace regurgitation. No stenosis noted.   Tricuspid Valve Valve structure is normal. Mild regurgitation. No stenosis noted. The estimated RVSP is 17 mmHg.   Pulmonic Valve Valve structure is normal. Trace regurgitation. No

## 2024-04-11 ENCOUNTER — OFFICE VISIT (OUTPATIENT)
Age: 74
End: 2024-04-11
Payer: MEDICARE

## 2024-04-11 VITALS
DIASTOLIC BLOOD PRESSURE: 60 MMHG | HEIGHT: 64 IN | HEART RATE: 77 BPM | BODY MASS INDEX: 22.53 KG/M2 | WEIGHT: 132 LBS | SYSTOLIC BLOOD PRESSURE: 140 MMHG

## 2024-04-11 DIAGNOSIS — E03.9 HYPOTHYROIDISM (ACQUIRED): ICD-10-CM

## 2024-04-11 DIAGNOSIS — K21.9 GASTROESOPHAGEAL REFLUX DISEASE WITHOUT ESOPHAGITIS: ICD-10-CM

## 2024-04-11 DIAGNOSIS — N18.31 STAGE 3A CHRONIC KIDNEY DISEASE (HCC): ICD-10-CM

## 2024-04-11 DIAGNOSIS — E78.2 MIXED HYPERLIPIDEMIA: ICD-10-CM

## 2024-04-11 DIAGNOSIS — I25.10 MILD CORONARY ARTERY DISEASE: Primary | ICD-10-CM

## 2024-04-11 PROCEDURE — G8427 DOCREV CUR MEDS BY ELIG CLIN: HCPCS | Performed by: INTERNAL MEDICINE

## 2024-04-11 PROCEDURE — 1090F PRES/ABSN URINE INCON ASSESS: CPT | Performed by: INTERNAL MEDICINE

## 2024-04-11 PROCEDURE — G8399 PT W/DXA RESULTS DOCUMENT: HCPCS | Performed by: INTERNAL MEDICINE

## 2024-04-11 PROCEDURE — 1123F ACP DISCUSS/DSCN MKR DOCD: CPT | Performed by: INTERNAL MEDICINE

## 2024-04-11 PROCEDURE — G8420 CALC BMI NORM PARAMETERS: HCPCS | Performed by: INTERNAL MEDICINE

## 2024-04-11 PROCEDURE — 99214 OFFICE O/P EST MOD 30 MIN: CPT | Performed by: INTERNAL MEDICINE

## 2024-04-11 PROCEDURE — 1036F TOBACCO NON-USER: CPT | Performed by: INTERNAL MEDICINE

## 2024-04-11 RX ORDER — PANTOPRAZOLE SODIUM 40 MG/1
40 FOR SUSPENSION ORAL
COMMUNITY

## 2024-04-11 RX ORDER — NITROGLYCERIN 0.4 MG/1
TABLET SUBLINGUAL
Qty: 25 TABLET | Refills: 3 | Status: SHIPPED | OUTPATIENT
Start: 2024-04-11

## 2024-04-11 RX ORDER — NICOTINE POLACRILEX 2 MG
GUM BUCCAL DAILY
COMMUNITY

## 2024-04-19 ENCOUNTER — NURSE ONLY (OUTPATIENT)
Dept: INTERNAL MEDICINE CLINIC | Facility: CLINIC | Age: 74
End: 2024-04-19

## 2024-04-19 DIAGNOSIS — N18.31 STAGE 3A CHRONIC KIDNEY DISEASE (HCC): ICD-10-CM

## 2024-04-19 DIAGNOSIS — E55.9 VITAMIN D DEFICIENCY: ICD-10-CM

## 2024-04-19 DIAGNOSIS — R73.01 ELEVATED FASTING GLUCOSE: ICD-10-CM

## 2024-04-19 DIAGNOSIS — E03.9 ACQUIRED HYPOTHYROIDISM: ICD-10-CM

## 2024-04-19 DIAGNOSIS — D69.6 THROMBOCYTOPENIA (HCC): ICD-10-CM

## 2024-04-19 DIAGNOSIS — E78.2 MIXED HYPERLIPIDEMIA: ICD-10-CM

## 2024-04-19 LAB
25(OH)D3 SERPL-MCNC: 46.1 NG/ML (ref 30–100)
ALBUMIN SERPL-MCNC: 3.8 G/DL (ref 3.2–4.6)
ALBUMIN/GLOB SERPL: 1.4 (ref 0.4–1.6)
ALP SERPL-CCNC: 60 U/L (ref 50–136)
ALT SERPL-CCNC: 22 U/L (ref 12–65)
ANION GAP SERPL CALC-SCNC: 3 MMOL/L (ref 2–11)
AST SERPL-CCNC: 19 U/L (ref 15–37)
BASOPHILS # BLD: 0.1 K/UL (ref 0–0.2)
BASOPHILS NFR BLD: 1 % (ref 0–2)
BILIRUB SERPL-MCNC: 1.1 MG/DL (ref 0.2–1.1)
BUN SERPL-MCNC: 19 MG/DL (ref 8–23)
CALCIUM SERPL-MCNC: 9 MG/DL (ref 8.3–10.4)
CHLORIDE SERPL-SCNC: 111 MMOL/L (ref 103–113)
CHOLEST SERPL-MCNC: 212 MG/DL
CO2 SERPL-SCNC: 27 MMOL/L (ref 21–32)
CREAT SERPL-MCNC: 1.2 MG/DL (ref 0.6–1)
DIFFERENTIAL METHOD BLD: ABNORMAL
EOSINOPHIL # BLD: 0.1 K/UL (ref 0–0.8)
EOSINOPHIL NFR BLD: 2 % (ref 0.5–7.8)
ERYTHROCYTE [DISTWIDTH] IN BLOOD BY AUTOMATED COUNT: 14.9 % (ref 11.9–14.6)
EST. AVERAGE GLUCOSE BLD GHB EST-MCNC: 103 MG/DL
GLOBULIN SER CALC-MCNC: 2.7 G/DL (ref 2.8–4.5)
GLUCOSE SERPL-MCNC: 100 MG/DL (ref 65–100)
HBA1C MFR BLD: 5.2 % (ref 4.8–5.6)
HCT VFR BLD AUTO: 37.4 % (ref 35.8–46.3)
HDLC SERPL-MCNC: 45 MG/DL (ref 40–60)
HDLC SERPL: 4.7
HGB BLD-MCNC: 13 G/DL (ref 11.7–15.4)
IMM GRANULOCYTES # BLD AUTO: 0 K/UL (ref 0–0.5)
IMM GRANULOCYTES NFR BLD AUTO: 0 % (ref 0–5)
LDLC SERPL CALC-MCNC: 128.6 MG/DL
LYMPHOCYTES # BLD: 2.3 K/UL (ref 0.5–4.6)
LYMPHOCYTES NFR BLD: 41 % (ref 13–44)
MCH RBC QN AUTO: 34 PG (ref 26.1–32.9)
MCHC RBC AUTO-ENTMCNC: 34.8 G/DL (ref 31.4–35)
MCV RBC AUTO: 97.9 FL (ref 82–102)
MONOCYTES # BLD: 0.6 K/UL (ref 0.1–1.3)
MONOCYTES NFR BLD: 11 % (ref 4–12)
NEUTS SEG # BLD: 2.6 K/UL (ref 1.7–8.2)
NEUTS SEG NFR BLD: 45 % (ref 43–78)
NRBC # BLD: 0 K/UL (ref 0–0.2)
PLATELET # BLD AUTO: 136 K/UL (ref 150–450)
PMV BLD AUTO: 12.7 FL (ref 9.4–12.3)
POTASSIUM SERPL-SCNC: 4.2 MMOL/L (ref 3.5–5.1)
PROT SERPL-MCNC: 6.5 G/DL (ref 6.3–8.2)
RBC # BLD AUTO: 3.82 M/UL (ref 4.05–5.2)
SODIUM SERPL-SCNC: 141 MMOL/L (ref 136–146)
TRIGL SERPL-MCNC: 192 MG/DL (ref 35–150)
TSH, 3RD GENERATION: 3.9 UIU/ML (ref 0.36–3.74)
VLDLC SERPL CALC-MCNC: 38.4 MG/DL (ref 6–23)
WBC # BLD AUTO: 5.7 K/UL (ref 4.3–11.1)

## 2024-04-25 PROBLEM — K31.7 GASTRIC POLYPS: Status: ACTIVE | Noted: 2024-04-25

## 2024-04-25 PROBLEM — D12.3 BENIGN NEOPLASM OF TRANSVERSE COLON: Status: ACTIVE | Noted: 2024-04-25

## 2024-04-25 PROBLEM — R07.89 OTHER CHEST PAIN: Status: ACTIVE | Noted: 2018-10-10

## 2024-04-25 PROBLEM — K64.8 INTERNAL HEMORRHOIDS: Status: ACTIVE | Noted: 2024-04-25

## 2024-04-25 NOTE — PROGRESS NOTES
Anna Lincoln (:  1950) is a 74 y.o. female,Established patient, here for evaluation of the following chief complaint(s):  Hypothyroidism (Pt is here for a 4 month routine f/u with lab review. ), Hyperlipidemia, and Elevated fasting glucose      Assessment & Plan   1. Mixed hyperlipidemia  -     Comprehensive Metabolic Panel; Future  -     Lipid Panel; Future  2. Moderate recurrent major depression (HCC)  -     VIIBRYD 20 MG Tablet; Take 1 tablet by mouth daily, Disp-90 tablet, R-3, DAWNormal  3. Acquired hypothyroidism  -     SYNTHROID 50 MCG tablet; Take 1 tablet by mouth every morning (before breakfast) Brand medically necessary., Disp-90 tablet, R-3, DAWNormal  -     TSH; Future  -     T4, Free; Future  4. Elevated fasting glucose  -     Comprehensive Metabolic Panel; Future  -     Hemoglobin A1C; Future  5. Stage 3a chronic kidney disease (HCC)  -     Comprehensive Metabolic Panel; Future  6. Gastroesophageal reflux disease without esophagitis  7. Situational stress  8. Thrombocytopenia (HCC)  -     CBC with Auto Differential; Future  9. Vitamin D deficiency      Patient Instructions   Advised separation of Pantoprazole from Synthroid - ideally take Synthroid in the middle of the night while up to urinate and take Protonix first thing in the morning upon awakening  Recommend avoidance of all NSAIDs (Aleve, Naproxen, Advil, Ibuprofen, Aspirin) with hopes of improving/normalizing kidney function  Urged further increase intake in water consumed daily to > 64 oz/day  Emphasized the importance of getting back on counselor's regular schedule given the re-ignited stress over unreliable relationship with son's family  Continue chronic medications as prescribed  Proceed with upper endoscopy as planned  Counseled about how damaging emotional stress is on her overall health including physical components like acid production in her stomach      Return in 14 weeks (on 2024) for MWE + routine f/u.

## 2024-04-29 ENCOUNTER — OFFICE VISIT (OUTPATIENT)
Dept: INTERNAL MEDICINE CLINIC | Facility: CLINIC | Age: 74
End: 2024-04-29
Payer: MEDICARE

## 2024-04-29 VITALS
DIASTOLIC BLOOD PRESSURE: 70 MMHG | WEIGHT: 132 LBS | HEIGHT: 64 IN | BODY MASS INDEX: 22.53 KG/M2 | OXYGEN SATURATION: 99 % | HEART RATE: 70 BPM | TEMPERATURE: 97.3 F | SYSTOLIC BLOOD PRESSURE: 122 MMHG

## 2024-04-29 DIAGNOSIS — E03.9 ACQUIRED HYPOTHYROIDISM: ICD-10-CM

## 2024-04-29 DIAGNOSIS — F33.1 MODERATE RECURRENT MAJOR DEPRESSION (HCC): ICD-10-CM

## 2024-04-29 DIAGNOSIS — R73.01 ELEVATED FASTING GLUCOSE: ICD-10-CM

## 2024-04-29 DIAGNOSIS — D69.6 THROMBOCYTOPENIA (HCC): ICD-10-CM

## 2024-04-29 DIAGNOSIS — N18.31 STAGE 3A CHRONIC KIDNEY DISEASE (HCC): ICD-10-CM

## 2024-04-29 DIAGNOSIS — K21.9 GASTROESOPHAGEAL REFLUX DISEASE WITHOUT ESOPHAGITIS: ICD-10-CM

## 2024-04-29 DIAGNOSIS — E78.2 MIXED HYPERLIPIDEMIA: Primary | ICD-10-CM

## 2024-04-29 DIAGNOSIS — F43.9 SITUATIONAL STRESS: ICD-10-CM

## 2024-04-29 DIAGNOSIS — E55.9 VITAMIN D DEFICIENCY: ICD-10-CM

## 2024-04-29 PROCEDURE — 99215 OFFICE O/P EST HI 40 MIN: CPT | Performed by: PHYSICIAN ASSISTANT

## 2024-04-29 PROCEDURE — G8420 CALC BMI NORM PARAMETERS: HCPCS | Performed by: PHYSICIAN ASSISTANT

## 2024-04-29 PROCEDURE — G8399 PT W/DXA RESULTS DOCUMENT: HCPCS | Performed by: PHYSICIAN ASSISTANT

## 2024-04-29 PROCEDURE — 1090F PRES/ABSN URINE INCON ASSESS: CPT | Performed by: PHYSICIAN ASSISTANT

## 2024-04-29 PROCEDURE — 1123F ACP DISCUSS/DSCN MKR DOCD: CPT | Performed by: PHYSICIAN ASSISTANT

## 2024-04-29 PROCEDURE — 1036F TOBACCO NON-USER: CPT | Performed by: PHYSICIAN ASSISTANT

## 2024-04-29 PROCEDURE — G8427 DOCREV CUR MEDS BY ELIG CLIN: HCPCS | Performed by: PHYSICIAN ASSISTANT

## 2024-04-29 RX ORDER — VILAZODONE HYDROCHLORIDE 20 MG/1
20 TABLET ORAL DAILY
Qty: 90 TABLET | Refills: 3 | Status: SHIPPED | OUTPATIENT
Start: 2024-04-29

## 2024-04-29 RX ORDER — LEVOTHYROXINE SODIUM 50 MCG
50 TABLET ORAL
Qty: 90 TABLET | Refills: 3 | Status: SHIPPED | OUTPATIENT
Start: 2024-04-29

## 2024-04-29 SDOH — ECONOMIC STABILITY: INCOME INSECURITY: HOW HARD IS IT FOR YOU TO PAY FOR THE VERY BASICS LIKE FOOD, HOUSING, MEDICAL CARE, AND HEATING?: NOT HARD AT ALL

## 2024-04-29 SDOH — ECONOMIC STABILITY: FOOD INSECURITY: WITHIN THE PAST 12 MONTHS, YOU WORRIED THAT YOUR FOOD WOULD RUN OUT BEFORE YOU GOT MONEY TO BUY MORE.: NEVER TRUE

## 2024-04-29 SDOH — ECONOMIC STABILITY: FOOD INSECURITY: WITHIN THE PAST 12 MONTHS, THE FOOD YOU BOUGHT JUST DIDN'T LAST AND YOU DIDN'T HAVE MONEY TO GET MORE.: NEVER TRUE

## 2024-04-29 ASSESSMENT — PATIENT HEALTH QUESTIONNAIRE - PHQ9
SUM OF ALL RESPONSES TO PHQ QUESTIONS 1-9: 7
SUM OF ALL RESPONSES TO PHQ QUESTIONS 1-9: 7
SUM OF ALL RESPONSES TO PHQ9 QUESTIONS 1 & 2: 2
3. TROUBLE FALLING OR STAYING ASLEEP: SEVERAL DAYS
7. TROUBLE CONCENTRATING ON THINGS, SUCH AS READING THE NEWSPAPER OR WATCHING TELEVISION: SEVERAL DAYS
4. FEELING TIRED OR HAVING LITTLE ENERGY: SEVERAL DAYS
6. FEELING BAD ABOUT YOURSELF - OR THAT YOU ARE A FAILURE OR HAVE LET YOURSELF OR YOUR FAMILY DOWN: SEVERAL DAYS
10. IF YOU CHECKED OFF ANY PROBLEMS, HOW DIFFICULT HAVE THESE PROBLEMS MADE IT FOR YOU TO DO YOUR WORK, TAKE CARE OF THINGS AT HOME, OR GET ALONG WITH OTHER PEOPLE: SOMEWHAT DIFFICULT
8. MOVING OR SPEAKING SO SLOWLY THAT OTHER PEOPLE COULD HAVE NOTICED. OR THE OPPOSITE, BEING SO FIGETY OR RESTLESS THAT YOU HAVE BEEN MOVING AROUND A LOT MORE THAN USUAL: NOT AT ALL
SUM OF ALL RESPONSES TO PHQ QUESTIONS 1-9: 7
5. POOR APPETITE OR OVEREATING: SEVERAL DAYS
9. THOUGHTS THAT YOU WOULD BE BETTER OFF DEAD, OR OF HURTING YOURSELF: NOT AT ALL
SUM OF ALL RESPONSES TO PHQ QUESTIONS 1-9: 7
1. LITTLE INTEREST OR PLEASURE IN DOING THINGS: SEVERAL DAYS
2. FEELING DOWN, DEPRESSED OR HOPELESS: SEVERAL DAYS

## 2024-04-29 ASSESSMENT — ENCOUNTER SYMPTOMS
ABDOMINAL PAIN: 0
NAUSEA: 0
BLOOD IN STOOL: 0
VOMITING: 0
ABDOMINAL DISTENTION: 1

## 2024-04-29 ASSESSMENT — ANXIETY QUESTIONNAIRES
1. FEELING NERVOUS, ANXIOUS, OR ON EDGE: MORE THAN HALF THE DAYS
6. BECOMING EASILY ANNOYED OR IRRITABLE: NOT AT ALL
2. NOT BEING ABLE TO STOP OR CONTROL WORRYING: MORE THAN HALF THE DAYS
7. FEELING AFRAID AS IF SOMETHING AWFUL MIGHT HAPPEN: SEVERAL DAYS
4. TROUBLE RELAXING: SEVERAL DAYS
GAD7 TOTAL SCORE: 7
5. BEING SO RESTLESS THAT IT IS HARD TO SIT STILL: NOT AT ALL
3. WORRYING TOO MUCH ABOUT DIFFERENT THINGS: SEVERAL DAYS
IF YOU CHECKED OFF ANY PROBLEMS ON THIS QUESTIONNAIRE, HOW DIFFICULT HAVE THESE PROBLEMS MADE IT FOR YOU TO DO YOUR WORK, TAKE CARE OF THINGS AT HOME, OR GET ALONG WITH OTHER PEOPLE: SOMEWHAT DIFFICULT

## 2024-05-01 NOTE — PATIENT INSTRUCTIONS
Advised separation of Pantoprazole from Synthroid - ideally take Synthroid in the middle of the night while up to urinate and take Protonix first thing in the morning upon awakening  Recommend avoidance of all NSAIDs (Aleve, Naproxen, Advil, Ibuprofen, Aspirin) with hopes of improving/normalizing kidney function  Urged further increase intake in water consumed daily to > 64 oz/day  Emphasized the importance of getting back on counselor's regular schedule given the re-ignited stress over unreliable relationship with son's family  Continue chronic medications as prescribed  Proceed with upper endoscopy as planned  Counseled about how damaging emotional stress is on her overall health including physical components like acid production in her stomach

## 2024-05-15 ENCOUNTER — TELEPHONE (OUTPATIENT)
Age: 74
End: 2024-05-15

## 2024-05-15 NOTE — TELEPHONE ENCOUNTER
Patient told their office that Dr Pillai said she is cleared for surgery but they have not received the clearance back Please call or fax clearance

## 2024-05-31 NOTE — RESULT ENCOUNTER NOTE
Linda Jo is a 56 year old female here for  Chief Complaint   Patient presents with    Breast Cancer     Personal history of breast cancer     Denies latex allergy or sensitivity.    Medication verified and med list updated.  PCP and Pharmacy verified.    Social History     Tobacco Use   Smoking Status Never   Smokeless Tobacco Never   Tobacco Comments    non tobacco user     Advance Directives Filed: Yes    ECOG:   ECOG [05/31/24 1155]   ECOG Performance Status 0       Vitals:    Visit Vitals  /60 (BP Location: LUE - Left upper extremity, Patient Position: Sitting)   Pulse 62   Temp 97.8 °F (36.6 °C) (Oral)   Resp 16   Wt 51.1 kg (112 lb 9.6 oz)   SpO2 99%   BMI 20.93 kg/m²       These vital signs are:  Within defined parameters (Per Reference \"Defined Limits Hospital Outpatient Department (HOD)\")    Height: No.  Ht Readings from Last 1 Encounters:   03/22/24 5' 1.5\" (1.562 m)     Weight:Yes, shoes off.  Wt Readings from Last 3 Encounters:   05/31/24 51.1 kg (112 lb 9.6 oz)   04/29/24 51.3 kg (113 lb)   03/22/24 51.7 kg (114 lb)       BMI: Body mass index is 20.93 kg/m².      PSYCHIATRIC: Patient denies insomnia, depression, anxiety    This patient reported abnormal symptoms that needed immediate verbal communication: No     Results will be discussed with patient during upcoming office visit.

## 2024-06-27 ENCOUNTER — TELEPHONE (OUTPATIENT)
Dept: INTERNAL MEDICINE CLINIC | Facility: CLINIC | Age: 74
End: 2024-06-27

## 2024-06-27 NOTE — TELEPHONE ENCOUNTER
----- Message from [place] Bullock sent at 6/27/2024  2:52 PM EDT -----  Regarding: ECC Message to Provider  ECC Message to Provider    Relationship to Patient: Self     Additional Information patient wants to check about the specimen on her blood for her last urine sample and she prefer ALFONSO to assist her if ever she needs another test  --------------------------------------------------------------------------------------------------------------------------    Call Back Information: OK to leave message on voicemail  Preferred Call Back Number: Phone [phone] (home)

## 2024-06-27 NOTE — TELEPHONE ENCOUNTER
No she needs to wait at a week or so after completing antibiotic before rechecking the urine plus we did not treat this so I would want to see her.  Please add her to my schedule on Monday, July 8 @ 3 pm.

## 2024-06-27 NOTE — TELEPHONE ENCOUNTER
Pt states that she went to [facility] on [address] on 6/21/24 for burning with urination and they put her on a course of cephalexin 500 mg TID. She took her last tab this morning and they contacted her to tell her that her culture showed that the cephalexin should take care of the infection, but her UA did show some blood and recommended a f/u UA at her PCP's office.     Contacted [facility] on [address] and requested records and UA and culture results be faxed to our office. They state that they are faxing records to us now.    Can pt come in tomorrow to leave a specimen on the [place] schedule? Please advise.

## 2024-07-07 PROBLEM — K31.7 POLYP OF STOMACH AND DUODENUM: Status: ACTIVE | Noted: 2024-05-29

## 2024-07-07 RX ORDER — LANSOPRAZOLE 30 MG/1
90 CAPSULE, DELAYED RELEASE ORAL
COMMUNITY
Start: 2024-05-29

## 2024-07-08 ENCOUNTER — OFFICE VISIT (OUTPATIENT)
Dept: INTERNAL MEDICINE CLINIC | Facility: CLINIC | Age: 74
End: 2024-07-08
Payer: MEDICARE

## 2024-07-08 VITALS
HEART RATE: 86 BPM | SYSTOLIC BLOOD PRESSURE: 110 MMHG | DIASTOLIC BLOOD PRESSURE: 70 MMHG | OXYGEN SATURATION: 99 % | TEMPERATURE: 97.3 F | HEIGHT: 64 IN | BODY MASS INDEX: 22.71 KG/M2 | WEIGHT: 133 LBS

## 2024-07-08 DIAGNOSIS — Z87.440 RECENT URINARY TRACT INFECTION: Primary | ICD-10-CM

## 2024-07-08 DIAGNOSIS — K21.9 HIATAL HERNIA WITH GASTROESOPHAGEAL REFLUX DISEASE WITHOUT ESOPHAGITIS: ICD-10-CM

## 2024-07-08 DIAGNOSIS — K44.9 HIATAL HERNIA WITH GASTROESOPHAGEAL REFLUX DISEASE WITHOUT ESOPHAGITIS: ICD-10-CM

## 2024-07-08 LAB
BILIRUBIN, URINE, POC: NEGATIVE
BLOOD URINE, POC: NEGATIVE
GLUCOSE URINE, POC: NEGATIVE
KETONES, URINE, POC: NEGATIVE
LEUKOCYTE ESTERASE, URINE, POC: NEGATIVE
NITRITE, URINE, POC: NEGATIVE
PH, URINE, POC: 5.5 (ref 4.6–8)
PROTEIN,URINE, POC: NEGATIVE
SPECIFIC GRAVITY, URINE, POC: 1.01 (ref 1–1.03)
URINALYSIS CLARITY, POC: CLEAR
URINALYSIS COLOR, POC: YELLOW
UROBILINOGEN, POC: NORMAL

## 2024-07-08 PROCEDURE — 99214 OFFICE O/P EST MOD 30 MIN: CPT | Performed by: PHYSICIAN ASSISTANT

## 2024-07-08 PROCEDURE — G8420 CALC BMI NORM PARAMETERS: HCPCS | Performed by: PHYSICIAN ASSISTANT

## 2024-07-08 PROCEDURE — G8427 DOCREV CUR MEDS BY ELIG CLIN: HCPCS | Performed by: PHYSICIAN ASSISTANT

## 2024-07-08 PROCEDURE — 81003 URINALYSIS AUTO W/O SCOPE: CPT | Performed by: PHYSICIAN ASSISTANT

## 2024-07-08 PROCEDURE — 1036F TOBACCO NON-USER: CPT | Performed by: PHYSICIAN ASSISTANT

## 2024-07-08 PROCEDURE — 1090F PRES/ABSN URINE INCON ASSESS: CPT | Performed by: PHYSICIAN ASSISTANT

## 2024-07-08 PROCEDURE — 1123F ACP DISCUSS/DSCN MKR DOCD: CPT | Performed by: PHYSICIAN ASSISTANT

## 2024-07-08 PROCEDURE — G8399 PT W/DXA RESULTS DOCUMENT: HCPCS | Performed by: PHYSICIAN ASSISTANT

## 2024-07-08 ASSESSMENT — ENCOUNTER SYMPTOMS
BACK PAIN: 0
VOMITING: 0
CONSTIPATION: 1
DIARRHEA: 1
ABDOMINAL DISTENTION: 1
BLOOD IN STOOL: 0
NAUSEA: 0
ABDOMINAL PAIN: 1

## 2024-07-08 NOTE — PROGRESS NOTES
Results for orders placed or performed in visit on 07/08/24   AMB POC URINALYSIS DIP STICK AUTO W/O MICRO   Result Value Ref Range    Color, Urine, POC Yellow     Clarity, Urine, POC Clear     Glucose, Urine, POC Negative     Bilirubin, Urine, POC Negative     Ketones, Urine, POC Negative     Specific Gravity, Urine, POC 1.015 1.001 - 1.035    Blood, Urine, POC Negative     pH, Urine, POC 5.5 4.6 - 8.0    Protein, Urine, POC Negative     Urobilinogen, POC 0.2 mg/dL     Nitrite, Urine, POC Negative     Leukocyte Esterase, Urine, POC Negative             An electronic signature was used to authenticate this note.    --LIZZETH NicoleC

## 2024-07-09 NOTE — PATIENT INSTRUCTIONS
Encouraged consistent daily hydration with plenty of water to keep urine dilute  Consider anti-reflux efforts including elevating head of bed, avoiding eating late, avoiding rushed meals, and chewing food extremely well  Recommend discussion of persistent reflux symptoms and increased diarrhea since switching to Prevacid  Continue chronic medications as prescribed  Will request records from [facility] so we can review and have record of culture results

## 2024-07-25 DIAGNOSIS — Z87.440 RECENT URINARY TRACT INFECTION: Primary | ICD-10-CM

## 2024-07-26 ENCOUNTER — LAB (OUTPATIENT)
Dept: INTERNAL MEDICINE CLINIC | Facility: CLINIC | Age: 74
End: 2024-07-26

## 2024-07-26 DIAGNOSIS — Z87.440 RECENT URINARY TRACT INFECTION: ICD-10-CM

## 2024-07-26 DIAGNOSIS — E78.2 MIXED HYPERLIPIDEMIA: ICD-10-CM

## 2024-07-26 DIAGNOSIS — E03.9 ACQUIRED HYPOTHYROIDISM: ICD-10-CM

## 2024-07-26 DIAGNOSIS — D69.6 THROMBOCYTOPENIA (HCC): ICD-10-CM

## 2024-07-26 DIAGNOSIS — R73.01 ELEVATED FASTING GLUCOSE: ICD-10-CM

## 2024-07-26 DIAGNOSIS — N18.31 STAGE 3A CHRONIC KIDNEY DISEASE (HCC): ICD-10-CM

## 2024-07-26 LAB
ALBUMIN SERPL-MCNC: 4 G/DL (ref 3.2–4.6)
ALBUMIN/GLOB SERPL: 1.5 (ref 1–1.9)
ALP SERPL-CCNC: 70 U/L (ref 35–104)
ALT SERPL-CCNC: 16 U/L (ref 12–65)
ANION GAP SERPL CALC-SCNC: 11 MMOL/L (ref 9–18)
APPEARANCE UR: CLEAR
AST SERPL-CCNC: 24 U/L (ref 15–37)
BACTERIA URNS QL MICRO: ABNORMAL /HPF
BASOPHILS # BLD: 0.1 K/UL (ref 0–0.2)
BASOPHILS NFR BLD: 1 % (ref 0–2)
BILIRUB SERPL-MCNC: 1 MG/DL (ref 0–1.2)
BILIRUB UR QL: NEGATIVE
BUN SERPL-MCNC: 17 MG/DL (ref 8–23)
CALCIUM SERPL-MCNC: 9.2 MG/DL (ref 8.8–10.2)
CASTS URNS QL MICRO: 0 /LPF
CHLORIDE SERPL-SCNC: 104 MMOL/L (ref 98–107)
CHOLEST SERPL-MCNC: 200 MG/DL (ref 0–200)
CO2 SERPL-SCNC: 26 MMOL/L (ref 20–28)
COLOR UR: ABNORMAL
CREAT SERPL-MCNC: 1.06 MG/DL (ref 0.6–1.1)
CRYSTALS URNS QL MICRO: 0 /LPF
DIFFERENTIAL METHOD BLD: ABNORMAL
EOSINOPHIL # BLD: 0.1 K/UL (ref 0–0.8)
EOSINOPHIL NFR BLD: 1 % (ref 0.5–7.8)
EPI CELLS #/AREA URNS HPF: ABNORMAL /HPF (ref 0–5)
ERYTHROCYTE [DISTWIDTH] IN BLOOD BY AUTOMATED COUNT: 14.8 % (ref 11.9–14.6)
EST. AVERAGE GLUCOSE BLD GHB EST-MCNC: 116 MG/DL
GLOBULIN SER CALC-MCNC: 2.6 G/DL (ref 2.3–3.5)
GLUCOSE SERPL-MCNC: 102 MG/DL (ref 70–99)
GLUCOSE UR STRIP.AUTO-MCNC: NEGATIVE MG/DL
HBA1C MFR BLD: 5.7 % (ref 0–5.6)
HCT VFR BLD AUTO: 37.6 % (ref 35.8–46.3)
HDLC SERPL-MCNC: 40 MG/DL (ref 40–60)
HDLC SERPL: 5 (ref 0–5)
HGB BLD-MCNC: 12.8 G/DL (ref 11.7–15.4)
HGB UR QL STRIP: NEGATIVE
HYALINE CASTS URNS QL MICRO: ABNORMAL /LPF
IMM GRANULOCYTES # BLD AUTO: 0 K/UL (ref 0–0.5)
IMM GRANULOCYTES NFR BLD AUTO: 0 % (ref 0–5)
KETONES UR QL STRIP.AUTO: NEGATIVE MG/DL
LDLC SERPL CALC-MCNC: 107 MG/DL (ref 0–100)
LEUKOCYTE ESTERASE UR QL STRIP.AUTO: ABNORMAL
LYMPHOCYTES # BLD: 2.6 K/UL (ref 0.5–4.6)
LYMPHOCYTES NFR BLD: 41 % (ref 13–44)
MCH RBC QN AUTO: 34 PG (ref 26.1–32.9)
MCHC RBC AUTO-ENTMCNC: 34 G/DL (ref 31.4–35)
MCV RBC AUTO: 100 FL (ref 82–102)
MONOCYTES # BLD: 0.7 K/UL (ref 0.1–1.3)
MONOCYTES NFR BLD: 10 % (ref 4–12)
MUCOUS THREADS URNS QL MICRO: 0 /LPF
NEUTS SEG # BLD: 3.1 K/UL (ref 1.7–8.2)
NEUTS SEG NFR BLD: 47 % (ref 43–78)
NITRITE UR QL STRIP.AUTO: NEGATIVE
NRBC # BLD: 0 K/UL (ref 0–0.2)
PH UR STRIP: 7 (ref 5–9)
PLATELET # BLD AUTO: 140 K/UL (ref 150–450)
PMV BLD AUTO: 12.9 FL (ref 9.4–12.3)
POTASSIUM SERPL-SCNC: 4.3 MMOL/L (ref 3.5–5.1)
PROT SERPL-MCNC: 6.5 G/DL (ref 6.3–8.2)
PROT UR STRIP-MCNC: NEGATIVE MG/DL
RBC # BLD AUTO: 3.76 M/UL (ref 4.05–5.2)
RBC #/AREA URNS HPF: ABNORMAL /HPF (ref 0–5)
SODIUM SERPL-SCNC: 140 MMOL/L (ref 136–145)
SP GR UR REFRACTOMETRY: 1.01 (ref 1–1.02)
T4 FREE SERPL-MCNC: 1 NG/DL (ref 0.9–1.7)
TRIGL SERPL-MCNC: 261 MG/DL (ref 0–150)
TSH, 3RD GENERATION: 2.8 UIU/ML (ref 0.27–4.2)
URINE CULTURE IF INDICATED: ABNORMAL
UROBILINOGEN UR QL STRIP.AUTO: 0.2 EU/DL (ref 0.2–1)
VLDLC SERPL CALC-MCNC: 52 MG/DL (ref 6–23)
WBC # BLD AUTO: 6.5 K/UL (ref 4.3–11.1)
WBC URNS QL MICRO: ABNORMAL /HPF (ref 0–4)

## 2024-08-01 RX ORDER — CLOBETASOL PROPIONATE 0.5 MG/G
OINTMENT TOPICAL
COMMUNITY
Start: 2024-07-24

## 2024-08-01 NOTE — PROGRESS NOTES
Anna Lincoln (:  1950) is a 74 y.o. female,Established patient, here for evaluation of the following chief complaint(s):  No chief complaint on file.      Assessment & Plan   1. Moderate recurrent major depression (HCC)  The following orders have not been finalized:  -     L-Methylfolate-Algae (DEPLIN 15) 15-90.314 MG CAPS  2. Chronic insomnia  The following orders have not been finalized:  -     clonazePAM (KLONOPIN) 1 MG tablet  3. Situational stress  The following orders have not been finalized:  -     clonazePAM (KLONOPIN) 1 MG tablet      No follow-ups on file.       Subjective   HPI    Review of Systems   Constitutional:  Positive for appetite change (altered taste). Negative for chills, diaphoresis, fatigue, fever and unexpected weight change.   HENT:  Positive for congestion, postnasal drip, rhinorrhea, sneezing and sore throat (improving). Negative for ear pain.    Eyes:  Negative for visual disturbance.   Respiratory:  Positive for cough (productive with light yellow  phlegm) and chest tightness. Negative for shortness of breath and wheezing.    Cardiovascular:  Negative for chest pain and palpitations.   Gastrointestinal:  Positive for diarrhea. Negative for constipation.        Positive for heartburn   Endocrine: Negative for cold intolerance, heat intolerance and polydipsia.        Negative for hair loss   Genitourinary:  Positive for dysuria (recent episode of UTI but resolved now), frequency (recent episode of UTI but resolved now) and urgency (recent episode of UTI but resolved now). Negative for hematuria.   Musculoskeletal:  Positive for myalgias (body aches at onset of COVID).   Neurological:  Negative for dizziness, numbness and headaches.   Psychiatric/Behavioral:  Negative for dysphoric mood (well managed with medication) and sleep disturbance (controlled with medication). The patient is not nervous/anxious (currently not bothersome).           Objective   Physical Exam

## 2024-08-05 ENCOUNTER — TELEPHONE (OUTPATIENT)
Dept: INTERNAL MEDICINE CLINIC | Facility: CLINIC | Age: 74
End: 2024-08-05

## 2024-08-05 NOTE — TELEPHONE ENCOUNTER
Pt tested pos for COVID this morning. Are you okay with her coming in for her appt with you tomorrow? Please advise.

## 2024-08-05 NOTE — TELEPHONE ENCOUNTER
Please call the patient in regards to her appt tomorrow and she has COVID, and she needs to know if she can come in. She has a UTI infection, she wants to know what she can do, she hates to come in with COVID - tested this morning,   Thank you   Mary

## 2024-08-05 NOTE — TELEPHONE ENCOUNTER
Spoke with Agatha Lundberg who states that she agrees with seeing pt virtually tomorrow. Pt notified and appt changed.

## 2024-08-06 ENCOUNTER — OFFICE VISIT (OUTPATIENT)
Dept: INTERNAL MEDICINE CLINIC | Facility: CLINIC | Age: 74
End: 2024-08-06
Payer: MEDICARE

## 2024-08-06 VITALS
SYSTOLIC BLOOD PRESSURE: 130 MMHG | HEART RATE: 71 BPM | DIASTOLIC BLOOD PRESSURE: 76 MMHG | TEMPERATURE: 97.3 F | WEIGHT: 131 LBS | OXYGEN SATURATION: 96 % | BODY MASS INDEX: 22.36 KG/M2 | HEIGHT: 64 IN

## 2024-08-06 DIAGNOSIS — R73.01 ELEVATED FASTING GLUCOSE: ICD-10-CM

## 2024-08-06 DIAGNOSIS — I25.10 MILD CORONARY ARTERY DISEASE: ICD-10-CM

## 2024-08-06 DIAGNOSIS — U07.1 COVID-19: ICD-10-CM

## 2024-08-06 DIAGNOSIS — E03.9 ACQUIRED HYPOTHYROIDISM: ICD-10-CM

## 2024-08-06 DIAGNOSIS — F51.04 CHRONIC INSOMNIA: ICD-10-CM

## 2024-08-06 DIAGNOSIS — N39.0 RECURRENT UTI: ICD-10-CM

## 2024-08-06 DIAGNOSIS — E78.2 MIXED HYPERLIPIDEMIA: ICD-10-CM

## 2024-08-06 DIAGNOSIS — D69.6 THROMBOCYTOPENIA (HCC): ICD-10-CM

## 2024-08-06 DIAGNOSIS — F33.1 MODERATE RECURRENT MAJOR DEPRESSION (HCC): ICD-10-CM

## 2024-08-06 DIAGNOSIS — Z00.00 MEDICARE ANNUAL WELLNESS VISIT, SUBSEQUENT: Primary | ICD-10-CM

## 2024-08-06 DIAGNOSIS — F43.9 SITUATIONAL STRESS: ICD-10-CM

## 2024-08-06 DIAGNOSIS — N18.31 STAGE 3A CHRONIC KIDNEY DISEASE (HCC): ICD-10-CM

## 2024-08-06 LAB
BILIRUBIN, URINE, POC: NEGATIVE
BLOOD URINE, POC: NEGATIVE
GLUCOSE URINE, POC: NEGATIVE
KETONES, URINE, POC: NEGATIVE
LEUKOCYTE ESTERASE, URINE, POC: NEGATIVE
NITRITE, URINE, POC: NEGATIVE
PH, URINE, POC: 6 (ref 4.6–8)
PROTEIN,URINE, POC: NEGATIVE
SPECIFIC GRAVITY, URINE, POC: 1.01 (ref 1–1.03)
URINALYSIS CLARITY, POC: CLEAR
URINALYSIS COLOR, POC: YELLOW
UROBILINOGEN, POC: NORMAL

## 2024-08-06 PROCEDURE — G8399 PT W/DXA RESULTS DOCUMENT: HCPCS | Performed by: PHYSICIAN ASSISTANT

## 2024-08-06 PROCEDURE — 1123F ACP DISCUSS/DSCN MKR DOCD: CPT | Performed by: PHYSICIAN ASSISTANT

## 2024-08-06 PROCEDURE — 81003 URINALYSIS AUTO W/O SCOPE: CPT | Performed by: PHYSICIAN ASSISTANT

## 2024-08-06 PROCEDURE — 1036F TOBACCO NON-USER: CPT | Performed by: PHYSICIAN ASSISTANT

## 2024-08-06 PROCEDURE — G8428 CUR MEDS NOT DOCUMENT: HCPCS | Performed by: PHYSICIAN ASSISTANT

## 2024-08-06 PROCEDURE — G0439 PPPS, SUBSEQ VISIT: HCPCS | Performed by: PHYSICIAN ASSISTANT

## 2024-08-06 PROCEDURE — 1090F PRES/ABSN URINE INCON ASSESS: CPT | Performed by: PHYSICIAN ASSISTANT

## 2024-08-06 PROCEDURE — G8420 CALC BMI NORM PARAMETERS: HCPCS | Performed by: PHYSICIAN ASSISTANT

## 2024-08-06 PROCEDURE — 99214 OFFICE O/P EST MOD 30 MIN: CPT | Performed by: PHYSICIAN ASSISTANT

## 2024-08-06 RX ORDER — CLONAZEPAM 1 MG/1
TABLET ORAL
Qty: 75 TABLET | Refills: 5 | Status: SHIPPED | OUTPATIENT
Start: 2024-08-06 | End: 2024-12-19

## 2024-08-06 RX ORDER — LEVOMEFOLATE/ALGAL OIL 15-90.314
15 CAPSULE ORAL DAILY
Qty: 90 CAPSULE | Refills: 3 | Status: SHIPPED | OUTPATIENT
Start: 2024-08-06

## 2024-08-06 ASSESSMENT — ENCOUNTER SYMPTOMS
SORE THROAT: 1
COUGH: 1
RHINORRHEA: 1
WHEEZING: 0
CHEST TIGHTNESS: 1
DIARRHEA: 1

## 2024-08-06 NOTE — PROGRESS NOTES
Medicare Annual Wellness Visit    Anna Lincoln is here for No chief complaint on file.    Assessment & Plan   Medicare annual wellness visit, subsequent  Moderate recurrent major depression (HCC)  -     L-Methylfolate-Algae (DEPLIN 15) 15-90.314 MG CAPS; Take 15 mg by mouth daily, Disp-90 capsule, R-3Normal  Chronic insomnia  -     clonazePAM (KLONOPIN) 1 MG tablet; Take 1/2-1 tablet po q AM as needed for anxiety & 1.5 tabets po q hs for sleep., Disp-75 tablet, R-5Normal  Situational stress  -     clonazePAM (KLONOPIN) 1 MG tablet; Take 1/2-1 tablet po q AM as needed for anxiety & 1.5 tabets po q hs for sleep., Disp-75 tablet, R-5Normal  Recurrent UTI  -     AMB POC URINALYSIS DIP STICK AUTO W/O MICRO  Mixed hyperlipidemia  -     Comprehensive Metabolic Panel; Future  -     Lipid Panel; Future  Mild coronary artery disease  -     Lipid Panel; Future  Elevated fasting glucose  -     Comprehensive Metabolic Panel; Future  -     Hemoglobin A1C; Future  Stage 3a chronic kidney disease (HCC)  -     Comprehensive Metabolic Panel; Future  Acquired hypothyroidism  -     TSH; Future  Thrombocytopenia (HCC)  -     Ferritin; Future  -     Vitamin B12; Future  -     Iron and TIBC; Future  -     CBC with Auto Differential; Future  COVID-19      Patient Instructions   Add OTC Mucinex 12 Hour twice daily  Recommend purchasing a pulse oximeter and monitoring O2 levels throughout COVID to ensure that they remain above 90% but ideally above 95%  Discussed option for Paxlovid but given symptomatic improvement today we opted to wait it out  Reminded to stay well hydrated with plenty of water  Provided radiology scheduling contact # so she can get overdue mammogram & DEXA scans scheduled  The [place] database was verified to have no suspicious activity before the issuance of a controlled substance prescription  Praised increased water consumption and urged to maintain this  Encouraged to resume a regular exercise routine ideally

## 2024-08-06 NOTE — PATIENT INSTRUCTIONS
Incorporated disclaims any warranty or liability for your use of this information.      Personalized Preventive Plan for Anna Lincoln - 8/6/2024  Medicare offers a range of preventive health benefits. Some of the tests and screenings are paid in full while other may be subject to a deductible, co-insurance, and/or copay.    Some of these benefits include a comprehensive review of your medical history including lifestyle, illnesses that may run in your family, and various assessments and screenings as appropriate.    After reviewing your medical record and screening and assessments performed today your provider may have ordered immunizations, labs, imaging, and/or referrals for you.  A list of these orders (if applicable) as well as your Preventive Care list are included within your After Visit Summary for your review.    Other Preventive Recommendations:    A preventive eye exam performed by an eye specialist is recommended every 1-2 years to screen for glaucoma; cataracts, macular degeneration, and other eye disorders.  A preventive dental visit is recommended every 6 months.  Try to get at least 150 minutes of exercise per week or 10,000 steps per day on a pedometer .  Order or download the FREE \"Exercise & Physical Activity: Your Everyday Guide\" from [employer]. Call 1-957.845.1851 or search [employer] online.  You need 6759-5649 mg of calcium and 4096-5284 IU of vitamin D per day. It is possible to meet your calcium requirement with diet alone, but a vitamin D supplement is usually necessary to meet this goal.  When exposed to the sun, use a sunscreen that protects against both UVA and UVB radiation with an SPF of 30 or greater. Reapply every 2 to 3 hours or after sweating, drying off with a towel, or swimming.  Always wear a seat belt when traveling in a car. Always wear a helmet when riding a bicycle or motorcycle.

## 2024-08-15 ENCOUNTER — NURSE ONLY (OUTPATIENT)
Age: 74
End: 2024-08-15
Payer: MEDICARE

## 2024-08-15 VITALS
DIASTOLIC BLOOD PRESSURE: 76 MMHG | TEMPERATURE: 97.2 F | OXYGEN SATURATION: 97 % | RESPIRATION RATE: 16 BRPM | HEART RATE: 78 BPM | SYSTOLIC BLOOD PRESSURE: 125 MMHG

## 2024-08-15 DIAGNOSIS — M85.852 OSTEOPENIA OF BOTH HIPS: Primary | ICD-10-CM

## 2024-08-15 DIAGNOSIS — M85.851 OSTEOPENIA OF BOTH HIPS: Primary | ICD-10-CM

## 2024-08-15 PROCEDURE — 96372 THER/PROPH/DIAG INJ SC/IM: CPT | Performed by: PSYCHIATRY & NEUROLOGY

## 2024-08-15 RX ORDER — ALBUTEROL SULFATE 90 UG/1
4 AEROSOL, METERED RESPIRATORY (INHALATION) PRN
OUTPATIENT
Start: 2025-01-30

## 2024-08-15 RX ORDER — SODIUM CHLORIDE 9 MG/ML
INJECTION, SOLUTION INTRAVENOUS CONTINUOUS
OUTPATIENT
Start: 2025-01-30

## 2024-08-15 RX ORDER — EPINEPHRINE 1 MG/ML
0.3 INJECTION, SOLUTION, CONCENTRATE INTRAVENOUS PRN
OUTPATIENT
Start: 2025-01-30

## 2024-08-15 RX ORDER — ACETAMINOPHEN 325 MG/1
650 TABLET ORAL
OUTPATIENT
Start: 2025-01-30

## 2024-08-15 RX ORDER — ONDANSETRON 2 MG/ML
8 INJECTION INTRAMUSCULAR; INTRAVENOUS
OUTPATIENT
Start: 2025-01-30

## 2024-08-15 RX ORDER — DIPHENHYDRAMINE HYDROCHLORIDE 50 MG/ML
50 INJECTION INTRAMUSCULAR; INTRAVENOUS
OUTPATIENT
Start: 2025-01-30

## 2024-08-15 NOTE — PROGRESS NOTES
[facility]  [address], [address] [place] [address]  Office : [phone], Fax: [phone]        Osteoporosis pre-infusion/injection questionnaire:     1. In the past month, have you had or are you planning to have any dental surgery or major dental procedures?  No     2. Are you taking a calcium and vitamin D supplement? Yes     3. When was your last osteoporosis treatment?  02/02/2024     4. In the last year, have you had any fractures? No        Patient arrived ambulatory to infusion suite today for a repeat / initial Prolia subcutaneous injection.  Pertinent labs drawn 07/26/2024 . Labs reviewed and are within medication administration parameters.  Prolia 60mg/ml injected SC into left arm. Patient tolerated injection well.  Pt observed for 30 minutes post-injection without complications.  Advised patient to continue with calcium and vitamin D supplements post injection. Advised patient to call the ordering provider with any problems post injection.       Next Prolia appt scheduled 02/19/2025 prior to departure from infusion suite.     Pt ambulatory with steady gait out of infusion suite.

## 2024-10-14 ASSESSMENT — ENCOUNTER SYMPTOMS
ABDOMINAL DISTENTION: 0
SORE THROAT: 0
COUGH: 0
ABDOMINAL PAIN: 0
DIARRHEA: 0
CHEST TIGHTNESS: 1
PHOTOPHOBIA: 0
CONSTIPATION: 0
SHORTNESS OF BREATH: 0

## 2024-10-14 NOTE — PROGRESS NOTES
Negative for dizziness, seizures, syncope and light-headedness.   Hematological:  Negative for adenopathy. Does not bruise/bleed easily.   Psychiatric/Behavioral:  Negative for agitation, behavioral problems, dysphoric mood and hallucinations. The patient is nervous/anxious.         Severe situational stress, anxiety        PHYSICAL EXAM:     Vitals:    10/17/24 1051   BP: 110/62   Pulse: 76   Weight: 61.2 kg (135 lb)   Height: 1.626 m (5' 4\")        Wt Readings from Last 3 Encounters:   10/17/24 61.2 kg (135 lb)   08/06/24 59.4 kg (131 lb)   07/08/24 60.3 kg (133 lb)      BP Readings from Last 3 Encounters:   10/17/24 110/62   08/15/24 125/76   08/06/24 130/76        Physical Exam  Constitutional:       Appearance: Normal appearance. She is normal weight.   HENT:      Head: Normocephalic and atraumatic.      Nose: Nose normal.      Mouth/Throat:      Mouth: Mucous membranes are moist.      Pharynx: Oropharynx is clear.   Eyes:      Extraocular Movements: Extraocular movements intact.      Pupils: Pupils are equal, round, and reactive to light.   Neck:      Vascular: No carotid bruit or JVD.   Cardiovascular:      Rate and Rhythm: Normal rate and regular rhythm.      Heart sounds: No murmur heard.     No friction rub. No gallop.   Pulmonary:      Effort: Pulmonary effort is normal.      Breath sounds: Normal breath sounds. No wheezing or rhonchi.   Abdominal:      General: Abdomen is flat. Bowel sounds are normal. There is no distension.      Palpations: Abdomen is soft.      Tenderness: There is no abdominal tenderness.   Musculoskeletal:         General: No swelling. Normal range of motion.      Cervical back: Normal range of motion and neck supple. No tenderness.   Skin:     General: Skin is warm and dry.   Neurological:      General: No focal deficit present.      Mental Status: She is alert and oriented to person, place, and time. Mental status is at baseline.   Psychiatric:         Mood and Affect: Mood

## 2024-10-17 ENCOUNTER — TELEPHONE (OUTPATIENT)
Age: 74
End: 2024-10-17

## 2024-10-17 ENCOUNTER — OFFICE VISIT (OUTPATIENT)
Age: 74
End: 2024-10-17

## 2024-10-17 ENCOUNTER — PATIENT MESSAGE (OUTPATIENT)
Age: 74
End: 2024-10-17

## 2024-10-17 VITALS
DIASTOLIC BLOOD PRESSURE: 62 MMHG | BODY MASS INDEX: 23.05 KG/M2 | WEIGHT: 135 LBS | HEIGHT: 64 IN | HEART RATE: 76 BPM | SYSTOLIC BLOOD PRESSURE: 110 MMHG

## 2024-10-17 DIAGNOSIS — I25.10 MILD CORONARY ARTERY DISEASE: Primary | ICD-10-CM

## 2024-10-17 DIAGNOSIS — K21.9 GASTROESOPHAGEAL REFLUX DISEASE WITHOUT ESOPHAGITIS: ICD-10-CM

## 2024-10-17 DIAGNOSIS — E03.9 HYPOTHYROIDISM (ACQUIRED): ICD-10-CM

## 2024-10-17 DIAGNOSIS — N18.31 STAGE 3A CHRONIC KIDNEY DISEASE (HCC): ICD-10-CM

## 2024-10-17 DIAGNOSIS — R07.2 PRECORDIAL PAIN: ICD-10-CM

## 2024-10-17 DIAGNOSIS — G45.9 TIA (TRANSIENT ISCHEMIC ATTACK): ICD-10-CM

## 2024-10-17 DIAGNOSIS — E78.2 MIXED HYPERLIPIDEMIA: ICD-10-CM

## 2024-10-17 DIAGNOSIS — Z79.899 HIGH RISK MEDICATION USE: ICD-10-CM

## 2024-10-17 LAB — TROPONIN T SERPL HS-MCNC: 10 NG/L (ref 0–14)

## 2024-10-17 RX ORDER — FAMOTIDINE 40 MG/1
40 TABLET, FILM COATED ORAL EVERY EVENING
Qty: 90 TABLET | Refills: 3 | Status: SHIPPED | OUTPATIENT
Start: 2024-10-17

## 2024-10-17 RX ORDER — PANTOPRAZOLE SODIUM 40 MG/1
40 TABLET, DELAYED RELEASE ORAL
Qty: 30 TABLET | Refills: 3 | Status: SHIPPED | OUTPATIENT
Start: 2024-10-17

## 2024-10-17 RX ORDER — METOPROLOL SUCCINATE 25 MG/1
TABLET, EXTENDED RELEASE ORAL
Qty: 2 TABLET | Refills: 0 | Status: SHIPPED | OUTPATIENT
Start: 2024-10-17

## 2024-10-17 RX ORDER — NITROGLYCERIN 0.4 MG/1
0.4 TABLET SUBLINGUAL EVERY 5 MIN PRN
Qty: 25 TABLET | Refills: 0 | Status: SHIPPED | OUTPATIENT
Start: 2024-10-17

## 2024-10-17 NOTE — TELEPHONE ENCOUNTER
----- Message from Dr. Adonay Pillai MD sent at 10/17/2024  4:49 PM EDT -----  No major abnormalities.  Continue current meds without change and keep routine followup.

## 2024-10-18 DIAGNOSIS — I25.10 MILD CORONARY ARTERY DISEASE: Primary | ICD-10-CM

## 2024-10-18 NOTE — TELEPHONE ENCOUNTER
Troponin is negative.  This is good.  She has not had any sort of cardiac insult or angina/heart attack.  Proceed with the CT scan and the MRI as ordered.

## 2024-10-21 ENCOUNTER — TELEPHONE (OUTPATIENT)
Age: 74
End: 2024-10-21

## 2024-10-21 ENCOUNTER — HOSPITAL ENCOUNTER (OUTPATIENT)
Dept: CT IMAGING | Age: 74
Discharge: HOME | End: 2024-10-24
Attending: INTERNAL MEDICINE
Payer: MEDICARE

## 2024-10-21 VITALS — SYSTOLIC BLOOD PRESSURE: 118 MMHG | DIASTOLIC BLOOD PRESSURE: 56 MMHG | HEART RATE: 58 BPM

## 2024-10-21 DIAGNOSIS — I25.10 MILD CORONARY ARTERY DISEASE: ICD-10-CM

## 2024-10-21 LAB — CREAT BLD-MCNC: 1 MG/DL (ref 0.8–1.5)

## 2024-10-21 PROCEDURE — 3209999900 CT CARDIAC OVER READ

## 2024-10-21 PROCEDURE — 6370000000 HC RX 637 (ALT 250 FOR IP): Performed by: INTERNAL MEDICINE

## 2024-10-21 PROCEDURE — 82565 ASSAY OF CREATININE: CPT

## 2024-10-21 PROCEDURE — 75574 CT ANGIO HRT W/3D IMAGE: CPT | Performed by: INTERNAL MEDICINE

## 2024-10-21 PROCEDURE — 75574 CT ANGIO HRT W/3D IMAGE: CPT

## 2024-10-21 PROCEDURE — 6360000004 HC RX CONTRAST MEDICATION: Performed by: INTERNAL MEDICINE

## 2024-10-21 RX ORDER — IOPAMIDOL 755 MG/ML
100 INJECTION, SOLUTION INTRAVASCULAR
Status: COMPLETED | OUTPATIENT
Start: 2024-10-21 | End: 2024-10-21

## 2024-10-21 RX ORDER — NITROGLYCERIN 0.4 MG/1
TABLET SUBLINGUAL PRN
Status: DISCONTINUED | OUTPATIENT
Start: 2024-10-21 | End: 2024-10-25 | Stop reason: HOSPADM

## 2024-10-21 RX ADMIN — IOPAMIDOL 100 ML: 755 INJECTION, SOLUTION INTRAVENOUS at 11:32

## 2024-10-21 RX ADMIN — NITROGLYCERIN 0.4 MG: 0.4 TABLET SUBLINGUAL at 11:22

## 2024-10-21 NOTE — TELEPHONE ENCOUNTER
----- Message from Dr. Adonay Pillai MD sent at 10/21/2024  2:29 PM EDT -----  CT angiogram looks great.  No significant coronary disease.  Her chest discomfort is most likely stress and anxiety mediated.  Continue plan and medications as we discussed.  Keep routine follow-up.  ----- Message -----  From: Jena Doran Incoming Orders Results To [facility]  Sent: 10/21/2024   2:05 PM EDT  To: Adonay Pillai MD

## 2024-10-21 NOTE — PROGRESS NOTES
Patient received nitro x1. Denies lightheadedness, dizziness and headache. Tolerated well. BP and HR remained stable.

## 2024-10-22 ENCOUNTER — HOSPITAL ENCOUNTER (OUTPATIENT)
Dept: MAMMOGRAPHY | Age: 74
Discharge: HOME | End: 2024-10-25
Payer: MEDICARE

## 2024-10-22 DIAGNOSIS — M85.851 OSTEOPENIA OF BOTH HIPS: ICD-10-CM

## 2024-10-22 DIAGNOSIS — M89.9 BONE DISORDER: ICD-10-CM

## 2024-10-22 DIAGNOSIS — M85.852 OSTEOPENIA OF BOTH HIPS: ICD-10-CM

## 2024-10-22 DIAGNOSIS — Z12.31 SCREENING MAMMOGRAM FOR BREAST CANCER: ICD-10-CM

## 2024-10-22 PROCEDURE — 77063 BREAST TOMOSYNTHESIS BI: CPT

## 2024-10-22 PROCEDURE — 77080 DXA BONE DENSITY AXIAL: CPT

## 2024-10-23 ENCOUNTER — HOSPITAL ENCOUNTER (OUTPATIENT)
Dept: MRI IMAGING | Age: 74
Discharge: HOME | End: 2024-10-26
Attending: INTERNAL MEDICINE
Payer: MEDICARE

## 2024-10-23 DIAGNOSIS — E78.2 MIXED HYPERLIPIDEMIA: ICD-10-CM

## 2024-10-23 DIAGNOSIS — G45.9 TIA (TRANSIENT ISCHEMIC ATTACK): ICD-10-CM

## 2024-10-23 PROCEDURE — 70551 MRI BRAIN STEM W/O DYE: CPT

## 2024-10-28 ENCOUNTER — TELEPHONE (OUTPATIENT)
Age: 74
End: 2024-10-28

## 2024-10-28 NOTE — TELEPHONE ENCOUNTER
----- Message from Dr. Adonay Pillai MD sent at 10/27/2024 11:54 AM EDT -----  No major abnormalities.  Continue current meds without change and keep routine followup.

## 2024-11-01 ENCOUNTER — LAB (OUTPATIENT)
Dept: INTERNAL MEDICINE CLINIC | Facility: CLINIC | Age: 74
End: 2024-11-01

## 2024-11-01 ENCOUNTER — OFFICE VISIT (OUTPATIENT)
Dept: NEUROLOGY | Age: 74
End: 2024-11-01
Payer: MEDICARE

## 2024-11-01 VITALS
BODY MASS INDEX: 23.12 KG/M2 | OXYGEN SATURATION: 96 % | SYSTOLIC BLOOD PRESSURE: 128 MMHG | WEIGHT: 135.4 LBS | HEART RATE: 80 BPM | DIASTOLIC BLOOD PRESSURE: 72 MMHG | HEIGHT: 64 IN

## 2024-11-01 DIAGNOSIS — E03.9 ACQUIRED HYPOTHYROIDISM: ICD-10-CM

## 2024-11-01 DIAGNOSIS — G44.52 NEW DAILY PERSISTENT HEADACHE: ICD-10-CM

## 2024-11-01 DIAGNOSIS — N18.31 STAGE 3A CHRONIC KIDNEY DISEASE (HCC): ICD-10-CM

## 2024-11-01 DIAGNOSIS — R73.01 ELEVATED FASTING GLUCOSE: ICD-10-CM

## 2024-11-01 DIAGNOSIS — G44.53 THUNDERCLAP HEADACHE: Primary | ICD-10-CM

## 2024-11-01 DIAGNOSIS — E78.2 MIXED HYPERLIPIDEMIA: ICD-10-CM

## 2024-11-01 DIAGNOSIS — D69.6 THROMBOCYTOPENIA (HCC): ICD-10-CM

## 2024-11-01 DIAGNOSIS — I25.10 MILD CORONARY ARTERY DISEASE: ICD-10-CM

## 2024-11-01 LAB
ALBUMIN SERPL-MCNC: 3.9 G/DL (ref 3.2–4.6)
ALBUMIN/GLOB SERPL: 1.4 (ref 1–1.9)
ALP SERPL-CCNC: 79 U/L (ref 35–104)
ALT SERPL-CCNC: 17 U/L (ref 8–45)
ANION GAP SERPL CALC-SCNC: 11 MMOL/L (ref 7–16)
AST SERPL-CCNC: 25 U/L (ref 15–37)
BASOPHILS # BLD: 0.1 K/UL (ref 0–0.2)
BASOPHILS NFR BLD: 1 % (ref 0–2)
BILIRUB SERPL-MCNC: 0.8 MG/DL (ref 0–1.2)
BUN SERPL-MCNC: 16 MG/DL (ref 8–23)
CALCIUM SERPL-MCNC: 8.8 MG/DL (ref 8.8–10.2)
CHLORIDE SERPL-SCNC: 108 MMOL/L (ref 98–107)
CHOLEST SERPL-MCNC: 198 MG/DL (ref 0–200)
CO2 SERPL-SCNC: 25 MMOL/L (ref 20–29)
CREAT SERPL-MCNC: 1.11 MG/DL (ref 0.6–1.1)
DIFFERENTIAL METHOD BLD: ABNORMAL
EOSINOPHIL # BLD: 0.1 K/UL (ref 0–0.8)
EOSINOPHIL NFR BLD: 2 % (ref 0.5–7.8)
ERYTHROCYTE [DISTWIDTH] IN BLOOD BY AUTOMATED COUNT: 14.9 % (ref 11.9–14.6)
EST. AVERAGE GLUCOSE BLD GHB EST-MCNC: 115 MG/DL
FERRITIN SERPL-MCNC: 48 NG/ML (ref 8–388)
GLOBULIN SER CALC-MCNC: 2.7 G/DL (ref 2.3–3.5)
GLUCOSE SERPL-MCNC: 104 MG/DL (ref 70–99)
HBA1C MFR BLD: 5.6 % (ref 0–5.6)
HCT VFR BLD AUTO: 37.4 % (ref 35.8–46.3)
HDLC SERPL-MCNC: 45 MG/DL (ref 40–60)
HDLC SERPL: 4.4 (ref 0–5)
HGB BLD-MCNC: 12.8 G/DL (ref 11.7–15.4)
IMM GRANULOCYTES # BLD AUTO: 0 K/UL (ref 0–0.5)
IMM GRANULOCYTES NFR BLD AUTO: 0 % (ref 0–5)
IRON SATN MFR SERPL: 35 % (ref 20–50)
IRON SERPL-MCNC: 110 UG/DL (ref 35–100)
LDLC SERPL CALC-MCNC: 113 MG/DL (ref 0–100)
LYMPHOCYTES # BLD: 2.2 K/UL (ref 0.5–4.6)
LYMPHOCYTES NFR BLD: 36 % (ref 13–44)
MCH RBC QN AUTO: 33.8 PG (ref 26.1–32.9)
MCHC RBC AUTO-ENTMCNC: 34.2 G/DL (ref 31.4–35)
MCV RBC AUTO: 98.7 FL (ref 82–102)
MONOCYTES # BLD: 0.7 K/UL (ref 0.1–1.3)
MONOCYTES NFR BLD: 11 % (ref 4–12)
NEUTS SEG # BLD: 3.1 K/UL (ref 1.7–8.2)
NEUTS SEG NFR BLD: 50 % (ref 43–78)
NRBC # BLD: 0 K/UL (ref 0–0.2)
PLATELET # BLD AUTO: 135 K/UL (ref 150–450)
PMV BLD AUTO: 12.3 FL (ref 9.4–12.3)
POTASSIUM SERPL-SCNC: 4.4 MMOL/L (ref 3.5–5.1)
PROT SERPL-MCNC: 6.5 G/DL (ref 6.3–8.2)
RBC # BLD AUTO: 3.79 M/UL (ref 4.05–5.2)
SODIUM SERPL-SCNC: 143 MMOL/L (ref 136–145)
TIBC SERPL-MCNC: 315 UG/DL (ref 240–450)
TRIGL SERPL-MCNC: 196 MG/DL (ref 0–150)
TSH, 3RD GENERATION: 2.9 UIU/ML (ref 0.27–4.2)
UIBC SERPL-MCNC: 205 UG/DL (ref 112–347)
VIT B12 SERPL-MCNC: 725 PG/ML (ref 193–986)
VLDLC SERPL CALC-MCNC: 39 MG/DL (ref 6–23)
WBC # BLD AUTO: 6.1 K/UL (ref 4.3–11.1)

## 2024-11-01 PROCEDURE — G8484 FLU IMMUNIZE NO ADMIN: HCPCS | Performed by: PHYSICAL THERAPIST

## 2024-11-01 PROCEDURE — G8420 CALC BMI NORM PARAMETERS: HCPCS | Performed by: PHYSICAL THERAPIST

## 2024-11-01 PROCEDURE — 1036F TOBACCO NON-USER: CPT | Performed by: PHYSICAL THERAPIST

## 2024-11-01 PROCEDURE — 1123F ACP DISCUSS/DSCN MKR DOCD: CPT | Performed by: PHYSICAL THERAPIST

## 2024-11-01 PROCEDURE — 99214 OFFICE O/P EST MOD 30 MIN: CPT | Performed by: PHYSICAL THERAPIST

## 2024-11-01 PROCEDURE — G8399 PT W/DXA RESULTS DOCUMENT: HCPCS | Performed by: PHYSICAL THERAPIST

## 2024-11-01 PROCEDURE — 1090F PRES/ABSN URINE INCON ASSESS: CPT | Performed by: PHYSICAL THERAPIST

## 2024-11-01 PROCEDURE — 1159F MED LIST DOCD IN RCRD: CPT | Performed by: PHYSICAL THERAPIST

## 2024-11-01 PROCEDURE — 1126F AMNT PAIN NOTED NONE PRSNT: CPT | Performed by: PHYSICAL THERAPIST

## 2024-11-01 PROCEDURE — G8427 DOCREV CUR MEDS BY ELIG CLIN: HCPCS | Performed by: PHYSICAL THERAPIST

## 2024-11-01 RX ORDER — PROPRANOLOL HCL 20 MG
20 TABLET ORAL 3 TIMES DAILY
Qty: 90 TABLET | Refills: 3 | Status: SHIPPED | OUTPATIENT
Start: 2024-11-01 | End: 2024-11-04 | Stop reason: SINTOL

## 2024-11-01 ASSESSMENT — PATIENT HEALTH QUESTIONNAIRE - PHQ9
SUM OF ALL RESPONSES TO PHQ QUESTIONS 1-9: 0
2. FEELING DOWN, DEPRESSED OR HOPELESS: NOT AT ALL
SUM OF ALL RESPONSES TO PHQ QUESTIONS 1-9: 0
1. LITTLE INTEREST OR PLEASURE IN DOING THINGS: NOT AT ALL
SUM OF ALL RESPONSES TO PHQ QUESTIONS 1-9: 0
SUM OF ALL RESPONSES TO PHQ QUESTIONS 1-9: 0
SUM OF ALL RESPONSES TO PHQ9 QUESTIONS 1 & 2: 0

## 2024-11-01 NOTE — PROGRESS NOTES
11/7/2024    10:08 AM 4/29/2024    10:06 AM 2/23/2023    12:03 PM   TARAH-7 SCREENING   Feeling nervous, anxious, or on edge Nearly every day More than half the days More than half the days   Not being able to stop or control worrying More than half the days More than half the days More than half the days   Worrying too much about different things More than half the days Several days Several days   Trouble relaxing More than half the days Several days Several days   Being so restless that it is hard to sit still Not at all Not at all Not at all   Becoming easily annoyed or irritable Not at all Not at all Not at all   Feeling afraid as if something awful might happen Several days Several days Not at all   TARAH-7 Total Score 10 7 6   How difficult have these problems made it for you to do your work, take care of things at home, or get along with other people? Somewhat difficult Somewhat difficult Somewhat difficult            Greater than 50% of this 75 minute visit was spent counseling Anna Lincoln about her lab results, status of chronic conditions, recommended medication changes and lifestyle modifications to gain or maintain good control of her overall health.      An electronic signature was used to authenticate this note.    --Agatha Lundberg PA-C

## 2024-11-01 NOTE — PROGRESS NOTES
1 tablet by mouth 3 times daily          Follow-up and Dispositions    Return in about 1 month (around 12/1/2024) for Headache.         I spent 45 total minutes of today's visit in coordination of care and patient/family education and counseling regarding the above patient concerns, reviewing the patient's medical record, my assessment and recommendations.       Cristiano Wong JR, PA  [facility] [address], [place] [address]  Phone:[phone]

## 2024-11-06 ENCOUNTER — HOSPITAL ENCOUNTER (OUTPATIENT)
Dept: CT IMAGING | Age: 74
Discharge: HOME | End: 2024-11-09
Payer: MEDICARE

## 2024-11-06 DIAGNOSIS — G44.53 THUNDERCLAP HEADACHE: ICD-10-CM

## 2024-11-06 PROCEDURE — 70496 CT ANGIOGRAPHY HEAD: CPT

## 2024-11-06 PROCEDURE — 70496 CT ANGIOGRAPHY HEAD: CPT | Performed by: RADIOLOGY

## 2024-11-06 PROCEDURE — 70498 CT ANGIOGRAPHY NECK: CPT | Performed by: RADIOLOGY

## 2024-11-06 PROCEDURE — 6360000004 HC RX CONTRAST MEDICATION: Performed by: PHYSICAL THERAPIST

## 2024-11-06 RX ORDER — IOPAMIDOL 755 MG/ML
100 INJECTION, SOLUTION INTRAVASCULAR
Status: COMPLETED | OUTPATIENT
Start: 2024-11-06 | End: 2024-11-06

## 2024-11-06 RX ADMIN — IOPAMIDOL 100 ML: 755 INJECTION, SOLUTION INTRAVENOUS at 09:08

## 2024-11-07 ENCOUNTER — OFFICE VISIT (OUTPATIENT)
Dept: INTERNAL MEDICINE CLINIC | Facility: CLINIC | Age: 74
End: 2024-11-07

## 2024-11-07 VITALS
TEMPERATURE: 97.4 F | BODY MASS INDEX: 23.05 KG/M2 | WEIGHT: 135 LBS | HEIGHT: 64 IN | OXYGEN SATURATION: 97 % | DIASTOLIC BLOOD PRESSURE: 70 MMHG | SYSTOLIC BLOOD PRESSURE: 130 MMHG | HEART RATE: 84 BPM

## 2024-11-07 DIAGNOSIS — I25.10 MILD CORONARY ARTERY DISEASE: ICD-10-CM

## 2024-11-07 DIAGNOSIS — K29.00 ACUTE EROSIVE GASTRITIS: ICD-10-CM

## 2024-11-07 DIAGNOSIS — R14.2 BELCHING: ICD-10-CM

## 2024-11-07 DIAGNOSIS — F43.9 SITUATIONAL STRESS: ICD-10-CM

## 2024-11-07 DIAGNOSIS — I65.23 CAROTID ATHEROSCLEROSIS, BILATERAL: ICD-10-CM

## 2024-11-07 DIAGNOSIS — D69.6 THROMBOCYTOPENIA (HCC): ICD-10-CM

## 2024-11-07 DIAGNOSIS — E03.9 ACQUIRED HYPOTHYROIDISM: ICD-10-CM

## 2024-11-07 DIAGNOSIS — M47.812 SPONDYLOSIS OF CERVICAL REGION WITHOUT MYELOPATHY OR RADICULOPATHY: ICD-10-CM

## 2024-11-07 DIAGNOSIS — G44.52 NEW DAILY PERSISTENT HEADACHE: ICD-10-CM

## 2024-11-07 DIAGNOSIS — R07.89 ATYPICAL CHEST PAIN: ICD-10-CM

## 2024-11-07 DIAGNOSIS — R73.01 ELEVATED FASTING GLUCOSE: ICD-10-CM

## 2024-11-07 DIAGNOSIS — G45.9 TIA (TRANSIENT ISCHEMIC ATTACK): ICD-10-CM

## 2024-11-07 DIAGNOSIS — R14.0 ABDOMINAL BLOATING: ICD-10-CM

## 2024-11-07 DIAGNOSIS — K21.9 GASTROESOPHAGEAL REFLUX DISEASE WITHOUT ESOPHAGITIS: ICD-10-CM

## 2024-11-07 DIAGNOSIS — F33.1 MODERATE RECURRENT MAJOR DEPRESSION (HCC): ICD-10-CM

## 2024-11-07 DIAGNOSIS — E78.2 MIXED HYPERLIPIDEMIA: Primary | ICD-10-CM

## 2024-11-07 DIAGNOSIS — Z23 NEED FOR INFLUENZA VACCINATION: ICD-10-CM

## 2024-11-07 DIAGNOSIS — N18.31 STAGE 3A CHRONIC KIDNEY DISEASE (HCC): ICD-10-CM

## 2024-11-07 RX ORDER — ATORVASTATIN CALCIUM 10 MG/1
10 TABLET, FILM COATED ORAL
Qty: 30 TABLET | Refills: 2 | Status: SHIPPED | OUTPATIENT
Start: 2024-11-07

## 2024-11-07 RX ORDER — VONOPRAZAN FUMARATE 13.36 MG/1
1 TABLET ORAL DAILY
Qty: 20 TABLET | Refills: 0 | Status: SHIPPED | COMMUNITY
Start: 2024-11-07

## 2024-11-07 ASSESSMENT — PATIENT HEALTH QUESTIONNAIRE - PHQ9
4. FEELING TIRED OR HAVING LITTLE ENERGY: MORE THAN HALF THE DAYS
SUM OF ALL RESPONSES TO PHQ QUESTIONS 1-9: 10
SUM OF ALL RESPONSES TO PHQ QUESTIONS 1-9: 10
2. FEELING DOWN, DEPRESSED OR HOPELESS: SEVERAL DAYS
SUM OF ALL RESPONSES TO PHQ9 QUESTIONS 1 & 2: 2
10. IF YOU CHECKED OFF ANY PROBLEMS, HOW DIFFICULT HAVE THESE PROBLEMS MADE IT FOR YOU TO DO YOUR WORK, TAKE CARE OF THINGS AT HOME, OR GET ALONG WITH OTHER PEOPLE: SOMEWHAT DIFFICULT
SUM OF ALL RESPONSES TO PHQ QUESTIONS 1-9: 10
6. FEELING BAD ABOUT YOURSELF - OR THAT YOU ARE A FAILURE OR HAVE LET YOURSELF OR YOUR FAMILY DOWN: SEVERAL DAYS
7. TROUBLE CONCENTRATING ON THINGS, SUCH AS READING THE NEWSPAPER OR WATCHING TELEVISION: SEVERAL DAYS
8. MOVING OR SPEAKING SO SLOWLY THAT OTHER PEOPLE COULD HAVE NOTICED. OR THE OPPOSITE, BEING SO FIGETY OR RESTLESS THAT YOU HAVE BEEN MOVING AROUND A LOT MORE THAN USUAL: NOT AT ALL
9. THOUGHTS THAT YOU WOULD BE BETTER OFF DEAD, OR OF HURTING YOURSELF: NOT AT ALL
5. POOR APPETITE OR OVEREATING: MORE THAN HALF THE DAYS
SUM OF ALL RESPONSES TO PHQ QUESTIONS 1-9: 10
1. LITTLE INTEREST OR PLEASURE IN DOING THINGS: SEVERAL DAYS
3. TROUBLE FALLING OR STAYING ASLEEP: MORE THAN HALF THE DAYS

## 2024-11-07 ASSESSMENT — ANXIETY QUESTIONNAIRES
2. NOT BEING ABLE TO STOP OR CONTROL WORRYING: MORE THAN HALF THE DAYS
5. BEING SO RESTLESS THAT IT IS HARD TO SIT STILL: NOT AT ALL
6. BECOMING EASILY ANNOYED OR IRRITABLE: NOT AT ALL
1. FEELING NERVOUS, ANXIOUS, OR ON EDGE: NEARLY EVERY DAY
7. FEELING AFRAID AS IF SOMETHING AWFUL MIGHT HAPPEN: SEVERAL DAYS
4. TROUBLE RELAXING: MORE THAN HALF THE DAYS
IF YOU CHECKED OFF ANY PROBLEMS ON THIS QUESTIONNAIRE, HOW DIFFICULT HAVE THESE PROBLEMS MADE IT FOR YOU TO DO YOUR WORK, TAKE CARE OF THINGS AT HOME, OR GET ALONG WITH OTHER PEOPLE: SOMEWHAT DIFFICULT
3. WORRYING TOO MUCH ABOUT DIFFERENT THINGS: MORE THAN HALF THE DAYS
GAD7 TOTAL SCORE: 10

## 2024-11-07 ASSESSMENT — ENCOUNTER SYMPTOMS
NAUSEA: 0
ABDOMINAL DISTENTION: 1
VOMITING: 0
BLOOD IN STOOL: 0
ABDOMINAL PAIN: 0

## 2024-11-07 NOTE — PATIENT INSTRUCTIONS
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 so it is possibly originating from that and/or associated muscular tension from recent worsened anxiety  Counseled to reduce intake of high fat/cholesterol foods such as fried foods, red meats, and certain dairy products (cheese, ice cream, butter/margarine, egg yolks, whole milk products) to help reduce cholesterol values  Advised to consider 2024 COVID vaccine (best to receive 2-4 weeks after flu vaccine) given worsened severity of recent COVID cases & benefits of maintaining active immunity      Patient Education        influenza virus vaccine (injection)  Pronunciation:  IN vidyao RAYSHAWN BARON seen  Brand:  Afluria PF Pediatric Quadrivalent [id number], Afluria PF Quadrivalent [id number], Afluria Quadrivalent [id number], Fluarix PF Quadrivalent [id number], Flublok Quadrivalent 2251-8519, Flucelvax PF Quadrivalent [id number], Flucelvax Quadrivalent [id number], FluLaval PF Quadrivalent 6400-1779, Fluzone High-Dose Quadrivalent PF [id number], Fluzone PF Pediatric Quadrivalent 6190-1305, Fluzone PF Quadrivalent 5721-9929, Fluzone Quadrivalent 3894-8433  What is the most important information I should know about this vaccine?  Influenza virus vaccine is made from \"killed viruses\" and will not cause you to become ill with the flu virus.   What is influenza virus vaccine?  Influenza virus (\"the flu\") is a

## 2024-11-13 RX ORDER — DIVALPROEX SODIUM 250 MG/1
250 TABLET, DELAYED RELEASE ORAL 2 TIMES DAILY
Qty: 30 TABLET | Refills: 0 | Status: SHIPPED | OUTPATIENT
Start: 2024-11-13

## 2024-11-13 NOTE — PROGRESS NOTES
Significant adverse effects with Elavil.  For her chronic and persistent headache we will attempt Depakote, 250 mg twice daily.  She is to message me back after a week to discuss efficacy.    [facility]  Neurology

## 2024-11-20 RX ORDER — DIVALPROEX SODIUM 250 MG/1
250 TABLET, DELAYED RELEASE ORAL 2 TIMES DAILY
Qty: 180 TABLET | Refills: 1 | OUTPATIENT
Start: 2024-11-20

## 2024-11-26 RX ORDER — LANSOPRAZOLE 30 MG/1
CAPSULE, DELAYED RELEASE ORAL DAILY
COMMUNITY
Start: 2024-11-13 | End: 2024-12-05

## 2024-11-26 NOTE — PROGRESS NOTES
Anna Lincoln (:  1950) is a 74 y.o. female,Established patient, here for evaluation of the following chief complaint(s):  Follow-up (4 week f/u for atorvastatin and Voquezna. )          Assessment/Plan  1. Gastroesophageal reflux disease without esophagitis  -     VOQUEZNA 20 MG TABS; Take 1 tablet by mouth daily, Disp-42 tablet, R-0, DAWSample  2. Mixed hyperlipidemia  3. Mild coronary artery disease  4. Carotid atherosclerosis, bilateral  Comments:  Mild  5. Acute erosive gastritis  6. Tremor         Patient Instructions   Trial increase of Voquezna to 20 mg daily - samples given with instruction to notify us when supply is low  Advised to discuss tremor with upcoming appt with neurology provider   Recommend assessing whether tremor is less/gone on days that she takes a daytime dose of Clonazepam for anxiety to help us determine how much of tremor is related to the anxious emotions she experiences  Continue chronic medications as prescribed  Advised to aim to get COVID booster this weekend so they are fully protected by the time they get together with family over [place]  Reminded to stay well hydrated with plenty of water      Return in about 2 months (around 2/10/2025) for previously scheduled appt.      Subjective   HPI  Patient is here for follow-up of GERD.  The current state of this condition is improved and no significant medication side effects noted on Voquezna 10 mg daily + Famotidine 40 mg q hs - taking as prescribed.  She reports less frequent breakthrough heartburn episodes (~ 1 time/day) with less severe chest pressure episodes though still happening daily.  She has started eating dinner earlier (4-5 pm).  The main benefit she sees since being on Voquezna is that she doesn't have diarrhea that she has experienced with all the other PPI therapies tried including Protonix, Prevacid, Nexium, & Dexilant.  Most recent work-up includes upper endoscopy in May 2024 with findings of small

## 2024-12-01 NOTE — PROGRESS NOTES
Previously on PPI in the morning and famotidine at night.  Add back Protonix 40 mg in the morning and famotidine at bedtime.    Hypothyroidism (acquired)- continue Synthroid with PCP surveillance    Mixed hyperlipidemia-continue healthy diet and lifestyle.  Recently started low-dose atorvastatin.  Recheck lipid and liver in 3 months.  Continue healthy diet and lifestyle and exercise as tolerated.     Possible TIA-acute right-sided weakness, persistent headache since that time.  Weakness and headaches have improved but still left with intermittent hand tremor bilaterally.  MRI is negative.  CTA benign as noted above.  Continue aspirin and statin.  Stay hydrated and keep neurology follow-up.    Return in about 6 months (around 6/3/2025).       AUGUSTINE HEATON MD  12/3/2024  3:13 PM

## 2024-12-03 ENCOUNTER — OFFICE VISIT (OUTPATIENT)
Age: 74
End: 2024-12-03
Payer: MEDICARE

## 2024-12-03 VITALS
HEART RATE: 92 BPM | DIASTOLIC BLOOD PRESSURE: 58 MMHG | SYSTOLIC BLOOD PRESSURE: 120 MMHG | BODY MASS INDEX: 23.54 KG/M2 | WEIGHT: 137.9 LBS | HEIGHT: 64 IN

## 2024-12-03 DIAGNOSIS — K21.9 GASTROESOPHAGEAL REFLUX DISEASE WITHOUT ESOPHAGITIS: ICD-10-CM

## 2024-12-03 DIAGNOSIS — I25.10 MILD CORONARY ARTERY DISEASE: Primary | ICD-10-CM

## 2024-12-03 DIAGNOSIS — E78.2 MIXED HYPERLIPIDEMIA: ICD-10-CM

## 2024-12-03 DIAGNOSIS — R06.02 SHORTNESS OF BREATH: ICD-10-CM

## 2024-12-03 PROCEDURE — G8420 CALC BMI NORM PARAMETERS: HCPCS | Performed by: INTERNAL MEDICINE

## 2024-12-03 PROCEDURE — 1126F AMNT PAIN NOTED NONE PRSNT: CPT | Performed by: INTERNAL MEDICINE

## 2024-12-03 PROCEDURE — 1159F MED LIST DOCD IN RCRD: CPT | Performed by: INTERNAL MEDICINE

## 2024-12-03 PROCEDURE — 1036F TOBACCO NON-USER: CPT | Performed by: INTERNAL MEDICINE

## 2024-12-03 PROCEDURE — G8482 FLU IMMUNIZE ORDER/ADMIN: HCPCS | Performed by: INTERNAL MEDICINE

## 2024-12-03 PROCEDURE — 1160F RVW MEDS BY RX/DR IN RCRD: CPT | Performed by: INTERNAL MEDICINE

## 2024-12-03 PROCEDURE — 99214 OFFICE O/P EST MOD 30 MIN: CPT | Performed by: INTERNAL MEDICINE

## 2024-12-03 PROCEDURE — G8399 PT W/DXA RESULTS DOCUMENT: HCPCS | Performed by: INTERNAL MEDICINE

## 2024-12-03 PROCEDURE — 1090F PRES/ABSN URINE INCON ASSESS: CPT | Performed by: INTERNAL MEDICINE

## 2024-12-03 PROCEDURE — 1123F ACP DISCUSS/DSCN MKR DOCD: CPT | Performed by: INTERNAL MEDICINE

## 2024-12-03 PROCEDURE — G8427 DOCREV CUR MEDS BY ELIG CLIN: HCPCS | Performed by: INTERNAL MEDICINE

## 2024-12-05 ENCOUNTER — OFFICE VISIT (OUTPATIENT)
Dept: INTERNAL MEDICINE CLINIC | Facility: CLINIC | Age: 74
End: 2024-12-05

## 2024-12-05 VITALS
SYSTOLIC BLOOD PRESSURE: 120 MMHG | TEMPERATURE: 97.3 F | HEIGHT: 64 IN | WEIGHT: 137 LBS | HEART RATE: 81 BPM | OXYGEN SATURATION: 96 % | BODY MASS INDEX: 23.39 KG/M2 | DIASTOLIC BLOOD PRESSURE: 70 MMHG

## 2024-12-05 DIAGNOSIS — I25.10 MILD CORONARY ARTERY DISEASE: ICD-10-CM

## 2024-12-05 DIAGNOSIS — R25.1 TREMOR: ICD-10-CM

## 2024-12-05 DIAGNOSIS — K21.9 GASTROESOPHAGEAL REFLUX DISEASE WITHOUT ESOPHAGITIS: Primary | ICD-10-CM

## 2024-12-05 DIAGNOSIS — E78.2 MIXED HYPERLIPIDEMIA: ICD-10-CM

## 2024-12-05 DIAGNOSIS — K29.00 ACUTE EROSIVE GASTRITIS: ICD-10-CM

## 2024-12-05 DIAGNOSIS — I65.23 CAROTID ATHEROSCLEROSIS, BILATERAL: ICD-10-CM

## 2024-12-05 RX ORDER — ATORVASTATIN CALCIUM 10 MG/1
10 TABLET, FILM COATED ORAL
Qty: 30 TABLET | Refills: 2 | Status: CANCELLED | OUTPATIENT
Start: 2024-12-05

## 2024-12-05 RX ORDER — VONOPRAZAN FUMARATE 26.72 MG/1
1 TABLET ORAL DAILY
Qty: 42 TABLET | Refills: 0 | Status: SHIPPED | COMMUNITY
Start: 2024-12-05

## 2024-12-05 RX ORDER — ACETAMINOPHEN/CAFFEINE 500MG-65MG
2 TABLET ORAL EVERY 6 HOURS PRN
COMMUNITY

## 2024-12-05 ASSESSMENT — PATIENT HEALTH QUESTIONNAIRE - PHQ9
2. FEELING DOWN, DEPRESSED OR HOPELESS: SEVERAL DAYS
1. LITTLE INTEREST OR PLEASURE IN DOING THINGS: SEVERAL DAYS
SUM OF ALL RESPONSES TO PHQ QUESTIONS 1-9: 2
SUM OF ALL RESPONSES TO PHQ9 QUESTIONS 1 & 2: 2

## 2024-12-05 ASSESSMENT — ENCOUNTER SYMPTOMS
NAUSEA: 0
ABDOMINAL DISTENTION: 1
ABDOMINAL PAIN: 0
BLOOD IN STOOL: 0
VOMITING: 0

## 2024-12-06 NOTE — PATIENT INSTRUCTIONS
Trial increase of Voquezna to 20 mg daily - samples given with instruction to notify us when supply is low  Advised to discuss tremor with upcoming appt with neurology provider   Recommend assessing whether tremor is less/gone on days that she takes a daytime dose of Clonazepam for anxiety to help us determine how much of tremor is related to the anxious emotions she experiences  Continue chronic medications as prescribed  Advised to aim to get COVID booster this weekend so they are fully protected by the time they get together with family over [place]  Reminded to stay well hydrated with plenty of water

## 2024-12-10 ENCOUNTER — OFFICE VISIT (OUTPATIENT)
Dept: NEUROLOGY | Age: 74
End: 2024-12-10
Payer: MEDICARE

## 2024-12-10 VITALS
OXYGEN SATURATION: 92 % | DIASTOLIC BLOOD PRESSURE: 68 MMHG | WEIGHT: 136.4 LBS | HEART RATE: 80 BPM | SYSTOLIC BLOOD PRESSURE: 104 MMHG | HEIGHT: 64 IN | BODY MASS INDEX: 23.29 KG/M2

## 2024-12-10 DIAGNOSIS — G44.53 THUNDERCLAP HEADACHE: Primary | ICD-10-CM

## 2024-12-10 DIAGNOSIS — G44.52 NEW DAILY PERSISTENT HEADACHE: ICD-10-CM

## 2024-12-10 PROCEDURE — G8420 CALC BMI NORM PARAMETERS: HCPCS | Performed by: PHYSICAL THERAPIST

## 2024-12-10 PROCEDURE — 1090F PRES/ABSN URINE INCON ASSESS: CPT | Performed by: PHYSICAL THERAPIST

## 2024-12-10 PROCEDURE — 1036F TOBACCO NON-USER: CPT | Performed by: PHYSICAL THERAPIST

## 2024-12-10 PROCEDURE — G8427 DOCREV CUR MEDS BY ELIG CLIN: HCPCS | Performed by: PHYSICAL THERAPIST

## 2024-12-10 PROCEDURE — 99214 OFFICE O/P EST MOD 30 MIN: CPT | Performed by: PHYSICAL THERAPIST

## 2024-12-10 PROCEDURE — G8399 PT W/DXA RESULTS DOCUMENT: HCPCS | Performed by: PHYSICAL THERAPIST

## 2024-12-10 PROCEDURE — 1126F AMNT PAIN NOTED NONE PRSNT: CPT | Performed by: PHYSICAL THERAPIST

## 2024-12-10 PROCEDURE — G8482 FLU IMMUNIZE ORDER/ADMIN: HCPCS | Performed by: PHYSICAL THERAPIST

## 2024-12-10 PROCEDURE — 1123F ACP DISCUSS/DSCN MKR DOCD: CPT | Performed by: PHYSICAL THERAPIST

## 2024-12-10 PROCEDURE — 1159F MED LIST DOCD IN RCRD: CPT | Performed by: PHYSICAL THERAPIST

## 2024-12-10 ASSESSMENT — PATIENT HEALTH QUESTIONNAIRE - PHQ9
SUM OF ALL RESPONSES TO PHQ9 QUESTIONS 1 & 2: 0
SUM OF ALL RESPONSES TO PHQ QUESTIONS 1-9: 0
1. LITTLE INTEREST OR PLEASURE IN DOING THINGS: NOT AT ALL
SUM OF ALL RESPONSES TO PHQ QUESTIONS 1-9: 0
2. FEELING DOWN, DEPRESSED OR HOPELESS: NOT AT ALL

## 2024-12-10 NOTE — PROGRESS NOTES
[facility] [address], [address]  [place] [address]  [phone]      Chief Complaint   Patient presents with    Follow-up    Headache       Original HPI  Anna Lincoln is a 74 y.o. female with past medical history of acoustic neuroma, BCC, depression, and hypothyroidism who presents on referral from her cardiologist, Dr. Pillai with a chief complaint of headache.  On 10/16, patient reports worst headache of her entire life, located on the left side of her head.  It is generally over the left middle portion, and was 10/10 pain for several seconds, then was followed by dull headache like pain for the next 30 to 40 minutes.  She also endorses right upper extremity and lower extremity weakness with this headache.  As the symptoms had resolved, she did not proceed with any emergent follow-up, and she did have appoint with a cardiologist next day.  He did end up ordering an MRI for this as he was concern for TIA versus CVA.  No vision changes, bowel incontinence, bladder incontinence, conjunctival tearing, or rhinorrhea during this event.  Never had a headache like this in the past.  Endorses she has had very few headaches like this in the past.  This headache occurred after about 2 weeks of a dull, aching like headache that occurred 2 to 3 hours in the morning and generally lasted most of the day with waxing and waning intensities.  Occasional light sensitivity, sound sensitivity, and pounding nature with this headache.  This is different from the headache she experienced on 10/16, which was sharp/shooting, followed by that dull aching pain that she had normally feeling.  Since that day has continued to have the dull/ache type headache, generally unrelenting.  Has tried a few over-the-counter treatments, but it is generally not been responsive to these.    Interval history:  Headaches have ceased, she is feeling much better overall.  She has been in control stressors in her

## 2024-12-17 ENCOUNTER — TELEPHONE (OUTPATIENT)
Dept: INTERNAL MEDICINE CLINIC | Facility: CLINIC | Age: 74
End: 2024-12-17

## 2024-12-17 DIAGNOSIS — K29.00 ACUTE EROSIVE GASTRITIS: ICD-10-CM

## 2024-12-17 DIAGNOSIS — K21.9 GASTROESOPHAGEAL REFLUX DISEASE WITHOUT ESOPHAGITIS: Primary | ICD-10-CM

## 2024-12-17 RX ORDER — VONOPRAZAN FUMARATE 26.72 MG/1
1 TABLET ORAL DAILY
Qty: 30 TABLET | Refills: 5 | Status: SHIPPED | OUTPATIENT
Start: 2024-12-17

## 2024-12-17 NOTE — TELEPHONE ENCOUNTER
Prescription sent.  Please contact pharmacy to see if PA is needed.  Details to submit are included in 12/5/24 office note.

## 2024-12-17 NOTE — TELEPHONE ENCOUNTER
Pt called, states she would like to get an Rx for a medication that Agatha recently gave her samples of. She states the medication has been working well for her.    Voquezna 20 mg, sent to [facility] [address]

## 2024-12-18 ENCOUNTER — TELEPHONE (OUTPATIENT)
Dept: INTERNAL MEDICINE CLINIC | Facility: CLINIC | Age: 74
End: 2024-12-18

## 2024-12-18 NOTE — TELEPHONE ENCOUNTER
Pt called, wanted to make Agatha aware that [facility] told her she is good to  her medication tomorrow. (Javan)

## 2025-01-15 ENCOUNTER — TELEMEDICINE (OUTPATIENT)
Dept: INTERNAL MEDICINE CLINIC | Facility: CLINIC | Age: 75
End: 2025-01-15

## 2025-01-15 DIAGNOSIS — R05.1 ACUTE COUGH: ICD-10-CM

## 2025-01-15 DIAGNOSIS — J01.10 ACUTE FRONTAL SINUSITIS, RECURRENCE NOT SPECIFIED: Primary | ICD-10-CM

## 2025-01-15 RX ORDER — DEXTROMETHORPHAN HYDROBROMIDE AND PROMETHAZINE HYDROCHLORIDE 15; 6.25 MG/5ML; MG/5ML
5 SYRUP ORAL 4 TIMES DAILY PRN
Qty: 240 ML | Refills: 0 | Status: SHIPPED | OUTPATIENT
Start: 2025-01-15 | End: 2025-01-22

## 2025-01-15 SDOH — ECONOMIC STABILITY: FOOD INSECURITY: WITHIN THE PAST 12 MONTHS, YOU WORRIED THAT YOUR FOOD WOULD RUN OUT BEFORE YOU GOT MONEY TO BUY MORE.: NEVER TRUE

## 2025-01-15 SDOH — ECONOMIC STABILITY: FOOD INSECURITY: WITHIN THE PAST 12 MONTHS, THE FOOD YOU BOUGHT JUST DIDN'T LAST AND YOU DIDN'T HAVE MONEY TO GET MORE.: NEVER TRUE

## 2025-01-15 SDOH — ECONOMIC STABILITY: INCOME INSECURITY: IN THE LAST 12 MONTHS, WAS THERE A TIME WHEN YOU WERE NOT ABLE TO PAY THE MORTGAGE OR RENT ON TIME?: NO

## 2025-01-15 SDOH — ECONOMIC STABILITY: TRANSPORTATION INSECURITY
IN THE PAST 12 MONTHS, HAS LACK OF TRANSPORTATION KEPT YOU FROM MEETINGS, WORK, OR FROM GETTING THINGS NEEDED FOR DAILY LIVING?: NO

## 2025-01-15 SDOH — ECONOMIC STABILITY: TRANSPORTATION INSECURITY
IN THE PAST 12 MONTHS, HAS THE LACK OF TRANSPORTATION KEPT YOU FROM MEDICAL APPOINTMENTS OR FROM GETTING MEDICATIONS?: NO

## 2025-01-15 ASSESSMENT — ENCOUNTER SYMPTOMS
GASTROINTESTINAL NEGATIVE: 1
SORE THROAT: 1
SINUS PRESSURE: 1
COUGH: 1
WHEEZING: 0
SHORTNESS OF BREATH: 0
SINUS PAIN: 1

## 2025-01-15 NOTE — PROGRESS NOTES
amoxicillin-clavulanate (AUGMENTIN) 875-125 MG per tablet; Take 1 tablet by mouth 2 times daily for 10 days  Dispense: 20 tablet; Refill: 0    2. Acute cough    - promethazine-dextromethorphan (PROMETHAZINE-DM) 6.25-15 MG/5ML syrup; Take 5 mLs by mouth 4 times daily as needed for Cough  Dispense: 240 mL; Refill: 0        Return if symptoms worsen or fail to improve.      Anna Lincoln, was evaluated through a synchronous (real-time) audio-video encounter. The patient (or guardian if applicable) is aware that this is a billable service, which includes applicable co-pays. This Virtual Visit was conducted with patient's (and/or legal guardian's) consent. Patient identification was verified, and a caregiver was present when appropriate.   The patient was located at Home: [address] [place] [id number]  Provider was located at Facility (Appt Dept): [address] [place] [phone]  Confirm you are appropriately licensed, registered, or certified to deliver care in the [facility] where the patient is located as indicated above. If you are not or unsure, please re-schedule the visit: Yes, I confirm.       Total time spent on this encounter:  20 min    --Renetta Castro PA-C on 1/15/2025 at 12:31 PM    An electronic signature was used to authenticate this note.

## 2025-01-28 ENCOUNTER — PATIENT MESSAGE (OUTPATIENT)
Dept: INTERNAL MEDICINE CLINIC | Facility: CLINIC | Age: 75
End: 2025-01-28

## 2025-01-28 DIAGNOSIS — I25.10 MILD CORONARY ARTERY DISEASE: ICD-10-CM

## 2025-01-28 DIAGNOSIS — I65.23 CAROTID ATHEROSCLEROSIS, BILATERAL: ICD-10-CM

## 2025-01-28 DIAGNOSIS — E78.2 MIXED HYPERLIPIDEMIA: ICD-10-CM

## 2025-01-29 DIAGNOSIS — I25.10 MILD CORONARY ARTERY DISEASE: ICD-10-CM

## 2025-01-29 DIAGNOSIS — E78.2 MIXED HYPERLIPIDEMIA: ICD-10-CM

## 2025-01-29 DIAGNOSIS — I65.23 CAROTID ATHEROSCLEROSIS, BILATERAL: ICD-10-CM

## 2025-01-29 RX ORDER — ATORVASTATIN CALCIUM 10 MG/1
10 TABLET, FILM COATED ORAL
Qty: 30 TABLET | Refills: 0 | Status: SHIPPED | OUTPATIENT
Start: 2025-01-29

## 2025-01-29 RX ORDER — ATORVASTATIN CALCIUM 10 MG/1
10 TABLET, FILM COATED ORAL NIGHTLY
Qty: 90 TABLET | OUTPATIENT
Start: 2025-01-29

## 2025-02-03 ENCOUNTER — TELEPHONE (OUTPATIENT)
Dept: NEUROLOGY | Age: 75
End: 2025-02-03

## 2025-02-03 ENCOUNTER — LAB (OUTPATIENT)
Dept: INTERNAL MEDICINE CLINIC | Facility: CLINIC | Age: 75
End: 2025-02-03

## 2025-02-03 DIAGNOSIS — K29.00 ACUTE EROSIVE GASTRITIS: ICD-10-CM

## 2025-02-03 DIAGNOSIS — R73.01 ELEVATED FASTING GLUCOSE: ICD-10-CM

## 2025-02-03 DIAGNOSIS — I25.10 MILD CORONARY ARTERY DISEASE: ICD-10-CM

## 2025-02-03 DIAGNOSIS — D69.6 THROMBOCYTOPENIA (HCC): ICD-10-CM

## 2025-02-03 DIAGNOSIS — E03.9 ACQUIRED HYPOTHYROIDISM: ICD-10-CM

## 2025-02-03 DIAGNOSIS — E78.2 MIXED HYPERLIPIDEMIA: ICD-10-CM

## 2025-02-03 DIAGNOSIS — N18.31 STAGE 3A CHRONIC KIDNEY DISEASE (HCC): ICD-10-CM

## 2025-02-03 DIAGNOSIS — I65.23 CAROTID ATHEROSCLEROSIS, BILATERAL: ICD-10-CM

## 2025-02-03 LAB
ALBUMIN SERPL-MCNC: 3.7 G/DL (ref 3.2–4.6)
ALBUMIN/GLOB SERPL: 1.3 (ref 1–1.9)
ALP SERPL-CCNC: 88 U/L (ref 35–104)
ALT SERPL-CCNC: 19 U/L (ref 8–45)
ANION GAP SERPL CALC-SCNC: 11 MMOL/L (ref 7–16)
AST SERPL-CCNC: 25 U/L (ref 15–37)
BASOPHILS # BLD: 0.08 K/UL (ref 0–0.2)
BASOPHILS NFR BLD: 1.3 % (ref 0–2)
BILIRUB SERPL-MCNC: 0.7 MG/DL (ref 0–1.2)
BUN SERPL-MCNC: 19 MG/DL (ref 8–23)
CALCIUM SERPL-MCNC: 9.2 MG/DL (ref 8.8–10.2)
CHLORIDE SERPL-SCNC: 108 MMOL/L (ref 98–107)
CHOLEST SERPL-MCNC: 144 MG/DL (ref 0–200)
CO2 SERPL-SCNC: 26 MMOL/L (ref 20–29)
CREAT SERPL-MCNC: 1.11 MG/DL (ref 0.6–1.1)
DIFFERENTIAL METHOD BLD: ABNORMAL
EOSINOPHIL # BLD: 0.09 K/UL (ref 0–0.8)
EOSINOPHIL NFR BLD: 1.5 % (ref 0.5–7.8)
ERYTHROCYTE [DISTWIDTH] IN BLOOD BY AUTOMATED COUNT: 14.8 % (ref 11.9–14.6)
EST. AVERAGE GLUCOSE BLD GHB EST-MCNC: 119 MG/DL
GLOBULIN SER CALC-MCNC: 2.9 G/DL (ref 2.3–3.5)
GLUCOSE SERPL-MCNC: 103 MG/DL (ref 70–99)
HBA1C MFR BLD: 5.8 % (ref 0–5.6)
HCT VFR BLD AUTO: 34.8 % (ref 35.8–46.3)
HDLC SERPL-MCNC: 45 MG/DL (ref 40–60)
HDLC SERPL: 3.2 (ref 0–5)
HGB BLD-MCNC: 12.4 G/DL (ref 11.7–15.4)
IMM GRANULOCYTES # BLD AUTO: 0.02 K/UL (ref 0–0.5)
IMM GRANULOCYTES NFR BLD AUTO: 0.3 % (ref 0–5)
LDLC SERPL CALC-MCNC: 70 MG/DL (ref 0–100)
LYMPHOCYTES # BLD: 2.63 K/UL (ref 0.5–4.6)
LYMPHOCYTES NFR BLD: 43.8 % (ref 13–44)
MCH RBC QN AUTO: 34 PG (ref 26.1–32.9)
MCHC RBC AUTO-ENTMCNC: 35.6 G/DL (ref 31.4–35)
MCV RBC AUTO: 95.3 FL (ref 82–102)
MONOCYTES # BLD: 0.64 K/UL (ref 0.1–1.3)
MONOCYTES NFR BLD: 10.6 % (ref 4–12)
NEUTS SEG # BLD: 2.55 K/UL (ref 1.7–8.2)
NEUTS SEG NFR BLD: 42.5 % (ref 43–78)
NRBC # BLD: 0 K/UL (ref 0–0.2)
PLATELET # BLD AUTO: 141 K/UL (ref 150–450)
PMV BLD AUTO: 12.9 FL (ref 9.4–12.3)
POTASSIUM SERPL-SCNC: 4.3 MMOL/L (ref 3.5–5.1)
PROT SERPL-MCNC: 6.6 G/DL (ref 6.3–8.2)
RBC # BLD AUTO: 3.65 M/UL (ref 4.05–5.2)
SODIUM SERPL-SCNC: 144 MMOL/L (ref 136–145)
TRIGL SERPL-MCNC: 146 MG/DL (ref 0–150)
TSH, 3RD GENERATION: 4.07 UIU/ML (ref 0.27–4.2)
VLDLC SERPL CALC-MCNC: 29 MG/DL (ref 6–23)
WBC # BLD AUTO: 6 K/UL (ref 4.3–11.1)

## 2025-02-06 ENCOUNTER — OFFICE VISIT (OUTPATIENT)
Dept: NEUROLOGY | Age: 75
End: 2025-02-06

## 2025-02-06 VITALS
BODY MASS INDEX: 23.73 KG/M2 | OXYGEN SATURATION: 96 % | HEART RATE: 64 BPM | WEIGHT: 139 LBS | HEIGHT: 64 IN | SYSTOLIC BLOOD PRESSURE: 108 MMHG | DIASTOLIC BLOOD PRESSURE: 69 MMHG

## 2025-02-06 DIAGNOSIS — R25.1 TREMOR: ICD-10-CM

## 2025-02-06 DIAGNOSIS — R51.9 NONINTRACTABLE HEADACHE, UNSPECIFIED CHRONICITY PATTERN, UNSPECIFIED HEADACHE TYPE: Primary | ICD-10-CM

## 2025-02-06 DIAGNOSIS — R51.9 NONINTRACTABLE HEADACHE, UNSPECIFIED CHRONICITY PATTERN, UNSPECIFIED HEADACHE TYPE: ICD-10-CM

## 2025-02-06 DIAGNOSIS — G44.53 THUNDERCLAP HEADACHE: ICD-10-CM

## 2025-02-06 LAB — ERYTHROCYTE [SEDIMENTATION RATE] IN BLOOD: <1 MM/HR (ref 0–30)

## 2025-02-06 RX ORDER — TOPIRAMATE 25 MG/1
25 TABLET, FILM COATED ORAL DAILY
Qty: 30 TABLET | Refills: 0 | Status: SHIPPED | OUTPATIENT
Start: 2025-02-06

## 2025-02-06 ASSESSMENT — PATIENT HEALTH QUESTIONNAIRE - PHQ9
SUM OF ALL RESPONSES TO PHQ QUESTIONS 1-9: 0
2. FEELING DOWN, DEPRESSED OR HOPELESS: NOT AT ALL
SUM OF ALL RESPONSES TO PHQ9 QUESTIONS 1 & 2: 0
1. LITTLE INTEREST OR PLEASURE IN DOING THINGS: NOT AT ALL
SUM OF ALL RESPONSES TO PHQ QUESTIONS 1-9: 0

## 2025-02-06 NOTE — PROGRESS NOTES
[facility] [address], [address]  [place] [address]  [phone]      Chief Complaint   Patient presents with    Follow-up     Thunderclap headache       Original HPI  Anna Lincoln is a 75 y.o. female with past medical history of acoustic neuroma, BCC, depression, and hypothyroidism who presents on referral from her cardiologist, Dr. Pillai with a chief complaint of headache.  On 10/16, patient reports worst headache of her entire life, located on the left side of her head.  It is generally over the left middle portion, and was 10/10 pain for several seconds, then was followed by dull headache like pain for the next 30 to 40 minutes.  She also endorses right upper extremity and lower extremity weakness with this headache.  As the symptoms had resolved, she did not proceed with any emergent follow-up, and she did have appoint with a cardiologist next day.  He did end up ordering an MRI for this as he was concern for TIA versus CVA.  No vision changes, bowel incontinence, bladder incontinence, conjunctival tearing, or rhinorrhea during this event.  Never had a headache like this in the past.  Endorses she has had very few headaches like this in the past.  This headache occurred after about 2 weeks of a dull, aching like headache that occurred 2 to 3 hours in the morning and generally lasted most of the day with waxing and waning intensities.  Occasional light sensitivity, sound sensitivity, and pounding nature with this headache.  This is different from the headache she experienced on 10/16, which was sharp/shooting, followed by that dull aching pain that she had normally feeling.  Since that day has continued to have the dull/ache type headache, generally unrelenting.  Has tried a few over-the-counter treatments, but it is generally not been responsive to these.    Interval history:  Having increased anxiety, headaches, and tremors.  States the tremors of the worse and

## 2025-02-07 PROBLEM — G45.9 TIA (TRANSIENT ISCHEMIC ATTACK): Status: ACTIVE | Noted: 2025-02-07

## 2025-02-07 RX ORDER — VILAZODONE HYDROCHLORIDE 20 MG/1
20 TABLET ORAL DAILY
Qty: 90 TABLET | Refills: 3 | Status: CANCELLED | OUTPATIENT
Start: 2025-02-07

## 2025-02-07 NOTE — PROGRESS NOTES
Anna Lincoln (:  1950) is a 75 y.o. female,Established patient, here for evaluation of the following chief complaint(s):  Hyperlipidemia (3 month routine f/u with lab review. ), Hypothyroidism, and Elevated fasting glucose          Assessment/Plan  1. Mixed hyperlipidemia  -     atorvastatin (LIPITOR) 10 MG tablet; Take 1 tablet by mouth nightly, Disp-90 tablet, R-3Normal  -     Comprehensive Metabolic Panel; Future  -     Lipid Panel; Future  2. Acquired hypothyroidism  -     SYNTHROID 50 MCG tablet; Take 1 tablet by mouth every morning (before breakfast) Brand medically necessary., Disp-90 tablet, R-3, DAWNormal  -     TSH; Future  -     T4, Free; Future  3. Moderate recurrent major depression (HCC)  4. Chronic insomnia  -     clonazePAM (KLONOPIN) 1 MG tablet; Take 1/2-1 tablet po q AM as needed for anxiety & 1.5 tabets po q hs for sleep., Disp-75 tablet, R-5Normal  5. Situational stress  -     clonazePAM (KLONOPIN) 1 MG tablet; Take 1/2-1 tablet po q AM as needed for anxiety & 1.5 tabets po q hs for sleep., Disp-75 tablet, R-5Normal  6. Mild coronary artery disease  -     atorvastatin (LIPITOR) 10 MG tablet; Take 1 tablet by mouth nightly, Disp-90 tablet, R-3Normal  -     Lipid Panel; Future  7. Carotid atherosclerosis, bilateral  Comments:  Mild  Orders:  -     atorvastatin (LIPITOR) 10 MG tablet; Take 1 tablet by mouth nightly, Disp-90 tablet, R-3Normal  -     Lipid Panel; Future  8. Need for RSV immunization  -     respiratory syncytial vaccine (ABRYSVO) 120 MCG/0.5ML SOLR injection; Inject 0.5 mLs into the muscle once for 1 dose, Disp-0.5 mL, R-0Print  9. Stage 3a chronic kidney disease (HCC)  -     Comprehensive Metabolic Panel; Future  10. Gastroesophageal reflux disease without esophagitis  -     VOQUEZNA 20 MG TABS; Take 1 tablet by mouth daily, Disp-20 tablet, R-0, DAWSample  11. Elevated fasting glucose  -     Comprehensive Metabolic Panel; Future  -     Hemoglobin A1C; Future  12.

## 2025-02-10 ENCOUNTER — OFFICE VISIT (OUTPATIENT)
Dept: INTERNAL MEDICINE CLINIC | Facility: CLINIC | Age: 75
End: 2025-02-10

## 2025-02-10 VITALS
HEIGHT: 64 IN | OXYGEN SATURATION: 96 % | HEART RATE: 79 BPM | DIASTOLIC BLOOD PRESSURE: 70 MMHG | SYSTOLIC BLOOD PRESSURE: 130 MMHG | WEIGHT: 137 LBS | TEMPERATURE: 97.9 F | BODY MASS INDEX: 23.39 KG/M2

## 2025-02-10 DIAGNOSIS — Z29.11 NEED FOR RSV IMMUNIZATION: ICD-10-CM

## 2025-02-10 DIAGNOSIS — F51.04 CHRONIC INSOMNIA: ICD-10-CM

## 2025-02-10 DIAGNOSIS — K21.9 GASTROESOPHAGEAL REFLUX DISEASE WITHOUT ESOPHAGITIS: ICD-10-CM

## 2025-02-10 DIAGNOSIS — I25.10 MILD CORONARY ARTERY DISEASE: ICD-10-CM

## 2025-02-10 DIAGNOSIS — E03.9 ACQUIRED HYPOTHYROIDISM: ICD-10-CM

## 2025-02-10 DIAGNOSIS — R53.82 CHRONIC FATIGUE: ICD-10-CM

## 2025-02-10 DIAGNOSIS — R25.1 TREMOR: ICD-10-CM

## 2025-02-10 DIAGNOSIS — F43.9 SITUATIONAL STRESS: ICD-10-CM

## 2025-02-10 DIAGNOSIS — I65.23 CAROTID ATHEROSCLEROSIS, BILATERAL: ICD-10-CM

## 2025-02-10 DIAGNOSIS — F33.1 MODERATE RECURRENT MAJOR DEPRESSION (HCC): ICD-10-CM

## 2025-02-10 DIAGNOSIS — E78.2 MIXED HYPERLIPIDEMIA: Primary | ICD-10-CM

## 2025-02-10 DIAGNOSIS — N18.31 STAGE 3A CHRONIC KIDNEY DISEASE (HCC): ICD-10-CM

## 2025-02-10 DIAGNOSIS — R73.01 ELEVATED FASTING GLUCOSE: ICD-10-CM

## 2025-02-10 DIAGNOSIS — D69.6 THROMBOCYTOPENIA (HCC): ICD-10-CM

## 2025-02-10 RX ORDER — LEVOTHYROXINE SODIUM 50 MCG
50 TABLET ORAL
Qty: 90 TABLET | Refills: 3 | Status: SHIPPED | OUTPATIENT
Start: 2025-02-10

## 2025-02-10 RX ORDER — VONOPRAZAN FUMARATE 26.72 MG/1
1 TABLET ORAL DAILY
Qty: 20 TABLET | Refills: 0 | Status: SHIPPED | COMMUNITY
Start: 2025-02-10

## 2025-02-10 RX ORDER — RESPIRATORY SYNCYTIAL VIRUS VACCINE 120MCG/0.5
0.5 KIT INTRAMUSCULAR ONCE
Qty: 0.5 ML | Refills: 0 | Status: SHIPPED | OUTPATIENT
Start: 2025-02-10 | End: 2025-02-10

## 2025-02-10 RX ORDER — ATORVASTATIN CALCIUM 10 MG/1
10 TABLET, FILM COATED ORAL
Qty: 90 TABLET | Refills: 3 | Status: SHIPPED | OUTPATIENT
Start: 2025-02-10

## 2025-02-10 RX ORDER — CLONAZEPAM 1 MG/1
TABLET ORAL
Qty: 75 TABLET | Refills: 5 | Status: SHIPPED | OUTPATIENT
Start: 2025-02-10 | End: 2025-06-22

## 2025-02-10 ASSESSMENT — ANXIETY QUESTIONNAIRES
1. FEELING NERVOUS, ANXIOUS, OR ON EDGE: NEARLY EVERY DAY
3. WORRYING TOO MUCH ABOUT DIFFERENT THINGS: SEVERAL DAYS
2. NOT BEING ABLE TO STOP OR CONTROL WORRYING: MORE THAN HALF THE DAYS
7. FEELING AFRAID AS IF SOMETHING AWFUL MIGHT HAPPEN: MORE THAN HALF THE DAYS
6. BECOMING EASILY ANNOYED OR IRRITABLE: SEVERAL DAYS
4. TROUBLE RELAXING: MORE THAN HALF THE DAYS
GAD7 TOTAL SCORE: 12
IF YOU CHECKED OFF ANY PROBLEMS ON THIS QUESTIONNAIRE, HOW DIFFICULT HAVE THESE PROBLEMS MADE IT FOR YOU TO DO YOUR WORK, TAKE CARE OF THINGS AT HOME, OR GET ALONG WITH OTHER PEOPLE: SOMEWHAT DIFFICULT
5. BEING SO RESTLESS THAT IT IS HARD TO SIT STILL: SEVERAL DAYS

## 2025-02-10 ASSESSMENT — PATIENT HEALTH QUESTIONNAIRE - PHQ9
SUM OF ALL RESPONSES TO PHQ9 QUESTIONS 1 & 2: 2
3. TROUBLE FALLING OR STAYING ASLEEP: SEVERAL DAYS
SUM OF ALL RESPONSES TO PHQ QUESTIONS 1-9: 6
SUM OF ALL RESPONSES TO PHQ QUESTIONS 1-9: 6
6. FEELING BAD ABOUT YOURSELF - OR THAT YOU ARE A FAILURE OR HAVE LET YOURSELF OR YOUR FAMILY DOWN: NOT AT ALL
5. POOR APPETITE OR OVEREATING: SEVERAL DAYS
1. LITTLE INTEREST OR PLEASURE IN DOING THINGS: SEVERAL DAYS
9. THOUGHTS THAT YOU WOULD BE BETTER OFF DEAD, OR OF HURTING YOURSELF: NOT AT ALL
SUM OF ALL RESPONSES TO PHQ QUESTIONS 1-9: 6
4. FEELING TIRED OR HAVING LITTLE ENERGY: SEVERAL DAYS
2. FEELING DOWN, DEPRESSED OR HOPELESS: SEVERAL DAYS
SUM OF ALL RESPONSES TO PHQ QUESTIONS 1-9: 6
10. IF YOU CHECKED OFF ANY PROBLEMS, HOW DIFFICULT HAVE THESE PROBLEMS MADE IT FOR YOU TO DO YOUR WORK, TAKE CARE OF THINGS AT HOME, OR GET ALONG WITH OTHER PEOPLE: NOT DIFFICULT AT ALL
7. TROUBLE CONCENTRATING ON THINGS, SUCH AS READING THE NEWSPAPER OR WATCHING TELEVISION: SEVERAL DAYS
8. MOVING OR SPEAKING SO SLOWLY THAT OTHER PEOPLE COULD HAVE NOTICED. OR THE OPPOSITE, BEING SO FIGETY OR RESTLESS THAT YOU HAVE BEEN MOVING AROUND A LOT MORE THAN USUAL: NOT AT ALL

## 2025-02-10 NOTE — PATIENT INSTRUCTIONS
Agree with start of Mag-Ox 400 mg with dinner  Move Famotidine to bedtime to allow time for full absorption of magnesium  Encouraged to maintain a regular exerciser routine  The [place] database was verified to have no suspicious activity before the issuance of a controlled substance prescription  Reviewed high concerns about physical effects of anxiety disorder which I am worried could be driving a lot of her current uncontrolled symptoms - advised continuing prn use of daytime Clonazepam for now while we await and see if Topamax has any benefit  Will check into coverage or steps needed to get Voquezna covered  Reminded of the importance to stay well hydrated with plenty of water  Strongly encouraged to work with  & counselor regularly to learn & put into practice stress releasing techniques including yoga  Continue chronic medications as prescribed  Reviewed medicine history & potential side effects with concerns over the following possibilities as causes of her ongoing issues:  Recommend staying on Topamax until next week when neurology PA wanted her to reach out with an update on tolerability  Viibryd - started Aug 2015: diarrhea (29%), nausea (24%), dyspepsia (common: 1-10%), headache (15%), tremor (common: 1-10%), insomnia (common: 1-10%), anxiety (frequency not reported), palpitations (common: 1-10%), fatigue (common: 1-10%)  Prolia - started May 28, 2015: angina (common: 1-10%), hypercholesterolemia (common: 1-10%), hyperhidrosis (common: 1-10%), nausea (31%), diarrhea (20%), GERD (common: 1-10%), headache (13%), anemia (common: 1-10%), fatigue (45%), insomnia (common: 1-10%)  Discussed benefits of RSV vaccine (Abrysvo) being 88.9% protection year #1 & 77.8% year #2 against serious lower respiratory tract disease from RSV with 20% risk of symptoms including headache, muscle pain, & nausea.  This can be obtained from local pharmacy with a prescription provided by medical provider  Will plan to hold

## 2025-02-18 DIAGNOSIS — F33.1 MODERATE RECURRENT MAJOR DEPRESSION (HCC): ICD-10-CM

## 2025-02-18 RX ORDER — VILAZODONE HYDROCHLORIDE 20 MG/1
20 TABLET ORAL DAILY
Qty: 90 TABLET | Refills: 3 | OUTPATIENT
Start: 2025-02-18

## 2025-02-19 ENCOUNTER — NURSE ONLY (OUTPATIENT)
Age: 75
End: 2025-02-19
Payer: MEDICARE

## 2025-02-19 VITALS
RESPIRATION RATE: 18 BRPM | HEART RATE: 74 BPM | TEMPERATURE: 97.2 F | DIASTOLIC BLOOD PRESSURE: 70 MMHG | OXYGEN SATURATION: 93 % | SYSTOLIC BLOOD PRESSURE: 125 MMHG

## 2025-02-19 DIAGNOSIS — M85.851 OSTEOPENIA OF BOTH HIPS: Primary | ICD-10-CM

## 2025-02-19 DIAGNOSIS — M85.852 OSTEOPENIA OF BOTH HIPS: Primary | ICD-10-CM

## 2025-02-19 PROCEDURE — 96372 THER/PROPH/DIAG INJ SC/IM: CPT | Performed by: PSYCHIATRY & NEUROLOGY

## 2025-02-19 RX ORDER — HYDROCORTISONE SODIUM SUCCINATE 100 MG/2ML
100 INJECTION INTRAMUSCULAR; INTRAVENOUS
OUTPATIENT
Start: 2025-02-19

## 2025-02-19 RX ORDER — EPINEPHRINE 1 MG/ML
0.3 INJECTION, SOLUTION, CONCENTRATE INTRAVENOUS PRN
OUTPATIENT
Start: 2025-08-17

## 2025-02-19 RX ORDER — ONDANSETRON 2 MG/ML
8 INJECTION INTRAMUSCULAR; INTRAVENOUS
OUTPATIENT
Start: 2025-08-17

## 2025-02-19 RX ORDER — SODIUM CHLORIDE 9 MG/ML
INJECTION, SOLUTION INTRAVENOUS CONTINUOUS
OUTPATIENT
Start: 2025-02-19

## 2025-02-19 RX ORDER — HYDROCORTISONE SODIUM SUCCINATE 100 MG/2ML
100 INJECTION INTRAMUSCULAR; INTRAVENOUS
OUTPATIENT
Start: 2025-08-17

## 2025-02-19 RX ORDER — ACETAMINOPHEN 325 MG/1
650 TABLET ORAL
OUTPATIENT
Start: 2025-02-19

## 2025-02-19 RX ORDER — ACETAMINOPHEN 325 MG/1
650 TABLET ORAL
OUTPATIENT
Start: 2025-08-17

## 2025-02-19 RX ORDER — EPINEPHRINE 1 MG/ML
0.3 INJECTION, SOLUTION, CONCENTRATE INTRAVENOUS PRN
OUTPATIENT
Start: 2025-02-19

## 2025-02-19 RX ORDER — ALBUTEROL SULFATE 90 UG/1
4 INHALANT RESPIRATORY (INHALATION) PRN
OUTPATIENT
Start: 2025-02-19

## 2025-02-19 RX ORDER — DIPHENHYDRAMINE HYDROCHLORIDE 50 MG/ML
50 INJECTION INTRAMUSCULAR; INTRAVENOUS
OUTPATIENT
Start: 2025-02-19

## 2025-02-19 RX ORDER — SODIUM CHLORIDE 9 MG/ML
INJECTION, SOLUTION INTRAVENOUS CONTINUOUS
OUTPATIENT
Start: 2025-08-17

## 2025-02-19 RX ORDER — ALBUTEROL SULFATE 90 UG/1
4 INHALANT RESPIRATORY (INHALATION) PRN
OUTPATIENT
Start: 2025-08-17

## 2025-02-19 RX ORDER — ONDANSETRON 2 MG/ML
8 INJECTION INTRAMUSCULAR; INTRAVENOUS
OUTPATIENT
Start: 2025-02-19

## 2025-02-19 RX ORDER — DIPHENHYDRAMINE HYDROCHLORIDE 50 MG/ML
50 INJECTION INTRAMUSCULAR; INTRAVENOUS
OUTPATIENT
Start: 2025-08-17

## 2025-02-19 NOTE — PROGRESS NOTES
[facility]  [address], [address] [place] [address]  Office : [phone], Fax: [phone]        Osteoporosis pre-infusion/injection questionnaire:     1. In the past month, have you had or are you planning to have any dental surgery or major dental procedures?  No     2. Are you taking a calcium and vitamin D supplement? Yes     3. When was your last osteoporosis treatment?  08/15/2024     4. In the last year, have you had any fractures? No        Patient arrived ambulatory to infusion suite today for a repeat  Prolia subcutaneous injection.    Pertinent labs drawn 02/03/2025 . Labs reviewed and are within medication administration parameters.    Prolia 60mg/ml injected SC into right arm. Patient tolerated injection well.    Advised patient to continue with calcium and vitamin D supplements post injection. Advised patient to call the ordering provider with any problems post injection.       Next Prolia appt scheduled 08/2025 prior to departure from infusion suite.     Pt ambulatory with steady gait out of infusion suite.

## 2025-02-25 RX ORDER — TOPIRAMATE 25 MG/1
25 TABLET, FILM COATED ORAL DAILY
Qty: 30 TABLET | Refills: 0 | OUTPATIENT
Start: 2025-02-25

## 2025-02-26 NOTE — TELEPHONE ENCOUNTER
Patient request refill on Topiramate and asking for several refills since provider will be leaving. Rx pended

## 2025-02-27 RX ORDER — TOPIRAMATE 25 MG/1
25 TABLET, FILM COATED ORAL DAILY
Qty: 30 TABLET | Refills: 0 | Status: SHIPPED | OUTPATIENT
Start: 2025-02-27

## 2025-03-31 ENCOUNTER — PATIENT MESSAGE (OUTPATIENT)
Dept: INTERNAL MEDICINE CLINIC | Facility: CLINIC | Age: 75
End: 2025-03-31

## 2025-03-31 DIAGNOSIS — R51.9 NONINTRACTABLE HEADACHE, UNSPECIFIED CHRONICITY PATTERN, UNSPECIFIED HEADACHE TYPE: Primary | ICD-10-CM

## 2025-03-31 NOTE — TELEPHONE ENCOUNTER
[facility] states that pt was to only follow-up with their office if her sx's worsened and they released her to her PCP.

## 2025-03-31 NOTE — TELEPHONE ENCOUNTER
Let's call the neurology office on her behalf and let them know that she needs to be set up with another provider due to Wong not being there anymore and that she needs refills before Wednesday when she is going to run out.  That is not a medication she should abruptly stop but would prefer the refills come from them since they started it.

## 2025-04-01 NOTE — TELEPHONE ENCOUNTER
Spoke with Gayla who is going to try to get a hold of  to see if they are going to be able to let her establish with someone else there or if we need to refer her to a different neurology group.  Last office note with Cristiano Wong says \"Return if symptoms worsen or fail to improve, for Headache and tremor\" but patient has not even had 1 follow-up appointment with them since being started on Topamax in February.  She needs to follow-up with neurology somehow and is in immediate need of refills since she will run out tomorrow.  There are are multiple refill requests in the system to them but have not been addressed.

## 2025-04-03 RX ORDER — TOPIRAMATE 25 MG/1
25 TABLET, FILM COATED ORAL DAILY
Qty: 30 TABLET | Refills: 0 | Status: SHIPPED | OUTPATIENT
Start: 2025-04-03

## 2025-04-11 RX ORDER — TOPIRAMATE 25 MG/1
25 TABLET, FILM COATED ORAL DAILY
Qty: 30 TABLET | Refills: 0 | OUTPATIENT
Start: 2025-04-11

## 2025-04-21 ENCOUNTER — OFFICE VISIT (OUTPATIENT)
Dept: NEUROLOGY | Age: 75
End: 2025-04-21
Payer: MEDICARE

## 2025-04-21 VITALS
BODY MASS INDEX: 23.39 KG/M2 | DIASTOLIC BLOOD PRESSURE: 74 MMHG | HEIGHT: 64 IN | OXYGEN SATURATION: 100 % | SYSTOLIC BLOOD PRESSURE: 120 MMHG | HEART RATE: 76 BPM | WEIGHT: 137 LBS

## 2025-04-21 DIAGNOSIS — G43.109 COMPLICATED MIGRAINE: Primary | ICD-10-CM

## 2025-04-21 DIAGNOSIS — G25.2 INTENTION TREMOR: ICD-10-CM

## 2025-04-21 PROCEDURE — 1036F TOBACCO NON-USER: CPT | Performed by: NURSE PRACTITIONER

## 2025-04-21 PROCEDURE — 1160F RVW MEDS BY RX/DR IN RCRD: CPT | Performed by: NURSE PRACTITIONER

## 2025-04-21 PROCEDURE — 99214 OFFICE O/P EST MOD 30 MIN: CPT | Performed by: NURSE PRACTITIONER

## 2025-04-21 PROCEDURE — 1126F AMNT PAIN NOTED NONE PRSNT: CPT | Performed by: NURSE PRACTITIONER

## 2025-04-21 PROCEDURE — 1159F MED LIST DOCD IN RCRD: CPT | Performed by: NURSE PRACTITIONER

## 2025-04-21 PROCEDURE — G8427 DOCREV CUR MEDS BY ELIG CLIN: HCPCS | Performed by: NURSE PRACTITIONER

## 2025-04-21 PROCEDURE — G8399 PT W/DXA RESULTS DOCUMENT: HCPCS | Performed by: NURSE PRACTITIONER

## 2025-04-21 PROCEDURE — G8420 CALC BMI NORM PARAMETERS: HCPCS | Performed by: NURSE PRACTITIONER

## 2025-04-21 PROCEDURE — 1123F ACP DISCUSS/DSCN MKR DOCD: CPT | Performed by: NURSE PRACTITIONER

## 2025-04-21 PROCEDURE — 1090F PRES/ABSN URINE INCON ASSESS: CPT | Performed by: NURSE PRACTITIONER

## 2025-04-21 RX ORDER — TOPIRAMATE 25 MG/1
25 TABLET, FILM COATED ORAL 2 TIMES DAILY
Qty: 60 TABLET | Refills: 11 | Status: SHIPPED | OUTPATIENT
Start: 2025-04-21

## 2025-04-21 ASSESSMENT — ENCOUNTER SYMPTOMS
PHOTOPHOBIA: 0
RESPIRATORY NEGATIVE: 1
GASTROINTESTINAL NEGATIVE: 1

## 2025-04-21 NOTE — PROGRESS NOTES
Patient: Anna Lincoln  MRN: [id number]  : 1950    CC: Headaches    HPI:   Anna Lincoln is a 75 y.o.yo female here for new patient evaluation for headaches.     Previously evaluated by Cristiano STONE, who stated the following:    \"HPI  Anna Lincoln is a 74 y.o. female with past medical history of acoustic neuroma, BCC, depression, and hypothyroidism who presents on referral from her cardiologist, Dr. Pillai with a chief complaint of headache.  On 10/16, patient reports worst headache of her entire life, located on the left side of her head.  It is generally over the left middle portion, and was 10/10 pain for several seconds, then was followed by dull headache like pain for the next 30 to 40 minutes.  She also endorses right upper extremity and lower extremity weakness with this headache.  As the symptoms had resolved, she did not proceed with any emergent follow-up, and she did have appoint with a cardiologist next day.  He did end up ordering an MRI for this as he was concern for TIA versus CVA.  No vision changes, bowel incontinence, bladder incontinence, conjunctival tearing, or rhinorrhea during this event.  Never had a headache like this in the past.  Endorses she has had very few headaches like this in the past.  This headache occurred after about 2 weeks of a dull, aching like headache that occurred 2 to 3 hours in the morning and generally lasted most of the day with waxing and waning intensities.  Occasional light sensitivity, sound sensitivity, and pounding nature with this headache.  This is different from the headache she experienced on 10/16, which was sharp/shooting, followed by that dull aching pain that she had normally feeling.  Since that day has continued to have the dull/ache type headache, generally unrelenting.  Has tried a few over-the-counter treatments, but it is generally not been responsive to these.\"      Interval history:     She is here today with her

## 2025-04-21 NOTE — PATIENT INSTRUCTIONS
Headache Education:   Instructed the patient on over-the-counter headache management medications including: CoQ10, magnesium oxide,riboflavin, fever few and butterbur.  To avoid a pain medication overuse headache trying not to take pain medicines more than 3 doses a week.   Avoid use of Fioricet or opioids to treat headaches as this can increase risk for rebound headaches.   To help relieve headache symptoms without taking pain medicine lie down under darkroom and drink glass of water.  Consider lifestyle modification including good sleep hygiene, routine medial schedules, regular exercise and managing triggers.  Keep a headache diary  to reveal triggers and possible patterns.  Triggers may be: Food, stress, perfumes, alcohol, or even chocolate.  Drink plenty of water and try to get 8 hours of sleep each night to reduce risk factors that may cause headaches.      Samples of Nurtec ODT 75 mg and Ubrelvy 50 mg provided to patient. Advised not to take medication on same day and to update office on efficacy.   Instructions:  Nurtec ODT 75 mg daily as needed for migraine abortive therapy. Not to exceed 75mg/24 hours.   Ubrelvy 50 mg daily as needed for migraine abortive therapy. May repeat for 1 dose in 2 hours if needed. Not to exceed 100mg/24 hours.

## 2025-04-23 ASSESSMENT — ENCOUNTER SYMPTOMS: BACK PAIN: 0

## 2025-04-23 NOTE — PROGRESS NOTES
Micro  WBC - tntc  RBC - 0  Bacteria - 0  Epith - 0    There were no vitals taken for this visit.     GENERAL: NAD, ALERT, A&O x 3, GAIT NORMAL  LUNGS: easy work of breathing  ABDOMEN: soft, non tender  NEUROLOGIC: cranial nerves 2-12 grossly intact       Assessment and Plan    ICD-10-CM    1. Recurrent UTI  N39.0 AMB POC URINALYSIS DIP STICK AUTO W/O MICRO     AMB POC PVR, SHANNAN,POST-VOID RES,US,NON-IMAGING     Culture, Urine     ciprofloxacin (CIPRO) 500 MG tablet          Urine was sent for culture.  Cipro was prescribed.  She will also begin a 36 pac cranberry supplement.  She will return with np in 3 mo to reassess.     I have spent 30 minutes today reviewing previous notes, test results and face to face with the patient as well as documenting.

## 2025-04-24 ENCOUNTER — OFFICE VISIT (OUTPATIENT)
Dept: UROLOGY | Age: 75
End: 2025-04-24
Payer: MEDICARE

## 2025-04-24 DIAGNOSIS — N39.0 RECURRENT UTI: Primary | ICD-10-CM

## 2025-04-24 LAB
BILIRUBIN, URINE, POC: NEGATIVE
BLOOD URINE, POC: NORMAL
GLUCOSE URINE, POC: NEGATIVE MG/DL
KETONES, URINE, POC: NEGATIVE MG/DL
LEUKOCYTE ESTERASE, URINE, POC: NORMAL
NITRITE, URINE, POC: NEGATIVE
PH, URINE, POC: 6 (ref 4.6–8)
PROTEIN,URINE, POC: NEGATIVE MG/DL
PVR, POC: 1 CC
SPECIFIC GRAVITY, URINE, POC: 1.02 (ref 1–1.03)
URINALYSIS CLARITY, POC: NORMAL
URINALYSIS COLOR, POC: NORMAL
UROBILINOGEN, POC: NORMAL MG/DL

## 2025-04-24 PROCEDURE — 1159F MED LIST DOCD IN RCRD: CPT | Performed by: UROLOGY

## 2025-04-24 PROCEDURE — G8427 DOCREV CUR MEDS BY ELIG CLIN: HCPCS | Performed by: UROLOGY

## 2025-04-24 PROCEDURE — 1036F TOBACCO NON-USER: CPT | Performed by: UROLOGY

## 2025-04-24 PROCEDURE — G8399 PT W/DXA RESULTS DOCUMENT: HCPCS | Performed by: UROLOGY

## 2025-04-24 PROCEDURE — G8420 CALC BMI NORM PARAMETERS: HCPCS | Performed by: UROLOGY

## 2025-04-24 PROCEDURE — 1160F RVW MEDS BY RX/DR IN RCRD: CPT | Performed by: UROLOGY

## 2025-04-24 PROCEDURE — 81003 URINALYSIS AUTO W/O SCOPE: CPT | Performed by: UROLOGY

## 2025-04-24 PROCEDURE — 51798 US URINE CAPACITY MEASURE: CPT | Performed by: UROLOGY

## 2025-04-24 PROCEDURE — 99214 OFFICE O/P EST MOD 30 MIN: CPT | Performed by: UROLOGY

## 2025-04-24 PROCEDURE — 1123F ACP DISCUSS/DSCN MKR DOCD: CPT | Performed by: UROLOGY

## 2025-04-24 PROCEDURE — 1090F PRES/ABSN URINE INCON ASSESS: CPT | Performed by: UROLOGY

## 2025-04-24 RX ORDER — CIPROFLOXACIN 500 MG/1
500 TABLET, FILM COATED ORAL 2 TIMES DAILY
Qty: 14 TABLET | Refills: 0 | Status: SHIPPED | OUTPATIENT
Start: 2025-04-24 | End: 2025-05-01

## 2025-04-26 LAB
BACTERIA SPEC CULT: NORMAL
SERVICE CMNT-IMP: NORMAL

## 2025-04-28 ENCOUNTER — RESULTS FOLLOW-UP (OUTPATIENT)
Dept: UROLOGY | Age: 75
End: 2025-04-28

## 2025-05-13 ENCOUNTER — LAB (OUTPATIENT)
Dept: INTERNAL MEDICINE CLINIC | Facility: CLINIC | Age: 75
End: 2025-05-13

## 2025-05-13 ENCOUNTER — RESULTS FOLLOW-UP (OUTPATIENT)
Dept: INTERNAL MEDICINE CLINIC | Facility: CLINIC | Age: 75
End: 2025-05-13

## 2025-05-13 DIAGNOSIS — N18.31 STAGE 3A CHRONIC KIDNEY DISEASE (HCC): ICD-10-CM

## 2025-05-13 DIAGNOSIS — I25.10 MILD CORONARY ARTERY DISEASE: ICD-10-CM

## 2025-05-13 DIAGNOSIS — E03.9 ACQUIRED HYPOTHYROIDISM: ICD-10-CM

## 2025-05-13 DIAGNOSIS — D69.6 THROMBOCYTOPENIA: ICD-10-CM

## 2025-05-13 DIAGNOSIS — R73.01 ELEVATED FASTING GLUCOSE: ICD-10-CM

## 2025-05-13 DIAGNOSIS — R53.82 CHRONIC FATIGUE: ICD-10-CM

## 2025-05-13 DIAGNOSIS — E78.2 MIXED HYPERLIPIDEMIA: ICD-10-CM

## 2025-05-13 DIAGNOSIS — I65.23 CAROTID ATHEROSCLEROSIS, BILATERAL: ICD-10-CM

## 2025-05-13 LAB
ALBUMIN SERPL-MCNC: 3.8 G/DL (ref 3.2–4.6)
ALBUMIN/GLOB SERPL: 1.3 (ref 1–1.9)
ALP SERPL-CCNC: 60 U/L (ref 35–104)
ALT SERPL-CCNC: 28 U/L (ref 8–45)
ANION GAP SERPL CALC-SCNC: 10 MMOL/L (ref 7–16)
AST SERPL-CCNC: 32 U/L (ref 15–37)
BASOPHILS # BLD: 0.07 K/UL (ref 0–0.2)
BASOPHILS NFR BLD: 1.2 % (ref 0–2)
BILIRUB SERPL-MCNC: 1 MG/DL (ref 0–1.2)
BUN SERPL-MCNC: 23 MG/DL (ref 8–23)
CALCIUM SERPL-MCNC: 9.4 MG/DL (ref 8.8–10.2)
CHLORIDE SERPL-SCNC: 111 MMOL/L (ref 98–107)
CHOLEST SERPL-MCNC: 147 MG/DL (ref 0–200)
CO2 SERPL-SCNC: 22 MMOL/L (ref 20–29)
CREAT SERPL-MCNC: 1.14 MG/DL (ref 0.6–1.1)
DIFFERENTIAL METHOD BLD: ABNORMAL
EOSINOPHIL # BLD: 0.1 K/UL (ref 0–0.8)
EOSINOPHIL NFR BLD: 1.7 % (ref 0.5–7.8)
ERYTHROCYTE [DISTWIDTH] IN BLOOD BY AUTOMATED COUNT: 15.4 % (ref 11.9–14.6)
EST. AVERAGE GLUCOSE BLD GHB EST-MCNC: 108 MG/DL
GLOBULIN SER CALC-MCNC: 2.9 G/DL (ref 2.3–3.5)
GLUCOSE SERPL-MCNC: 100 MG/DL (ref 70–99)
HBA1C MFR BLD: 5.4 % (ref 0–5.6)
HCT VFR BLD AUTO: 36.8 % (ref 35.8–46.3)
HDLC SERPL-MCNC: 46 MG/DL (ref 40–60)
HDLC SERPL: 3.2 (ref 0–5)
HGB BLD-MCNC: 12.9 G/DL (ref 11.7–15.4)
IMM GRANULOCYTES # BLD AUTO: 0.01 K/UL (ref 0–0.5)
IMM GRANULOCYTES NFR BLD AUTO: 0.2 % (ref 0–5)
LDLC SERPL CALC-MCNC: 81 MG/DL (ref 0–100)
LYMPHOCYTES # BLD: 2.4 K/UL (ref 0.5–4.6)
LYMPHOCYTES NFR BLD: 41.5 % (ref 13–44)
MCH RBC QN AUTO: 34.3 PG (ref 26.1–32.9)
MCHC RBC AUTO-ENTMCNC: 35.1 G/DL (ref 31.4–35)
MCV RBC AUTO: 97.9 FL (ref 82–102)
MONOCYTES # BLD: 0.6 K/UL (ref 0.1–1.3)
MONOCYTES NFR BLD: 10.4 % (ref 4–12)
NEUTS SEG # BLD: 2.6 K/UL (ref 1.7–8.2)
NEUTS SEG NFR BLD: 45 % (ref 43–78)
NRBC # BLD: 0 K/UL (ref 0–0.2)
PLATELET # BLD AUTO: 137 K/UL (ref 150–450)
PMV BLD AUTO: 13 FL (ref 9.4–12.3)
POTASSIUM SERPL-SCNC: 4.2 MMOL/L (ref 3.5–5.1)
PROT SERPL-MCNC: 6.7 G/DL (ref 6.3–8.2)
RBC # BLD AUTO: 3.76 M/UL (ref 4.05–5.2)
SODIUM SERPL-SCNC: 144 MMOL/L (ref 136–145)
T4 FREE SERPL-MCNC: 0.9 NG/DL (ref 0.9–1.7)
TRIGL SERPL-MCNC: 102 MG/DL (ref 0–150)
TSH, 3RD GENERATION: 4.51 UIU/ML (ref 0.27–4.2)
VLDLC SERPL CALC-MCNC: 20 MG/DL (ref 6–23)
WBC # BLD AUTO: 5.8 K/UL (ref 4.3–11.1)

## 2025-05-21 RX ORDER — VONOPRAZAN FUMARATE 26.72 MG/1
1 TABLET ORAL DAILY
Qty: 30 TABLET | Refills: 5 | Status: CANCELLED | OUTPATIENT
Start: 2025-05-21

## 2025-05-21 RX ORDER — VILAZODONE HYDROCHLORIDE 20 MG/1
20 TABLET ORAL DAILY
Qty: 90 TABLET | Refills: 3 | Status: CANCELLED | OUTPATIENT
Start: 2025-05-21

## 2025-05-21 ASSESSMENT — ENCOUNTER SYMPTOMS
SHORTNESS OF BREATH: 0
CONSTIPATION: 0
DIARRHEA: 0

## 2025-05-21 NOTE — PROGRESS NOTES
How difficult have these problems made it for you to do your work, take care of things at home, or get along with other people? Somewhat difficult Somewhat difficult Somewhat difficult            Greater than 50% of this 65 minute visit was spent counseling Anna Lincoln about her lab results, status of chronic conditions, recommended medication changes and lifestyle modifications to gain or maintain good control of her overall health.      An electronic signature was used to authenticate this note.    --LIZZETH NicoleC

## 2025-05-22 ENCOUNTER — OFFICE VISIT (OUTPATIENT)
Dept: INTERNAL MEDICINE CLINIC | Facility: CLINIC | Age: 75
End: 2025-05-22
Payer: MEDICARE

## 2025-05-22 VITALS
WEIGHT: 133 LBS | SYSTOLIC BLOOD PRESSURE: 136 MMHG | HEART RATE: 83 BPM | TEMPERATURE: 98.1 F | HEIGHT: 64 IN | DIASTOLIC BLOOD PRESSURE: 80 MMHG | OXYGEN SATURATION: 98 % | BODY MASS INDEX: 22.71 KG/M2

## 2025-05-22 DIAGNOSIS — K21.9 GASTROESOPHAGEAL REFLUX DISEASE WITHOUT ESOPHAGITIS: ICD-10-CM

## 2025-05-22 DIAGNOSIS — I65.23 CAROTID ATHEROSCLEROSIS, BILATERAL: ICD-10-CM

## 2025-05-22 DIAGNOSIS — R73.01 ELEVATED FASTING GLUCOSE: Primary | ICD-10-CM

## 2025-05-22 DIAGNOSIS — N18.31 STAGE 3A CHRONIC KIDNEY DISEASE (HCC): ICD-10-CM

## 2025-05-22 DIAGNOSIS — D69.6 THROMBOCYTOPENIA: ICD-10-CM

## 2025-05-22 DIAGNOSIS — I25.10 MILD CORONARY ARTERY DISEASE: ICD-10-CM

## 2025-05-22 DIAGNOSIS — F33.1 MODERATE RECURRENT MAJOR DEPRESSION (HCC): ICD-10-CM

## 2025-05-22 DIAGNOSIS — E78.2 MIXED HYPERLIPIDEMIA: ICD-10-CM

## 2025-05-22 DIAGNOSIS — R53.82 CHRONIC FATIGUE: ICD-10-CM

## 2025-05-22 DIAGNOSIS — K29.00 ACUTE EROSIVE GASTRITIS: ICD-10-CM

## 2025-05-22 DIAGNOSIS — E03.9 ACQUIRED HYPOTHYROIDISM: ICD-10-CM

## 2025-05-22 DIAGNOSIS — G25.2 INTENTION TREMOR: ICD-10-CM

## 2025-05-22 PROCEDURE — 99215 OFFICE O/P EST HI 40 MIN: CPT | Performed by: PHYSICIAN ASSISTANT

## 2025-05-22 PROCEDURE — G8399 PT W/DXA RESULTS DOCUMENT: HCPCS | Performed by: PHYSICIAN ASSISTANT

## 2025-05-22 PROCEDURE — G8427 DOCREV CUR MEDS BY ELIG CLIN: HCPCS | Performed by: PHYSICIAN ASSISTANT

## 2025-05-22 PROCEDURE — G2211 COMPLEX E/M VISIT ADD ON: HCPCS | Performed by: PHYSICIAN ASSISTANT

## 2025-05-22 PROCEDURE — G8420 CALC BMI NORM PARAMETERS: HCPCS | Performed by: PHYSICIAN ASSISTANT

## 2025-05-22 PROCEDURE — 1123F ACP DISCUSS/DSCN MKR DOCD: CPT | Performed by: PHYSICIAN ASSISTANT

## 2025-05-22 PROCEDURE — 1125F AMNT PAIN NOTED PAIN PRSNT: CPT | Performed by: PHYSICIAN ASSISTANT

## 2025-05-22 PROCEDURE — 1159F MED LIST DOCD IN RCRD: CPT | Performed by: PHYSICIAN ASSISTANT

## 2025-05-22 PROCEDURE — 1036F TOBACCO NON-USER: CPT | Performed by: PHYSICIAN ASSISTANT

## 2025-05-22 PROCEDURE — 1090F PRES/ABSN URINE INCON ASSESS: CPT | Performed by: PHYSICIAN ASSISTANT

## 2025-05-22 RX ORDER — LEVOMEFOLATE/ALGAL OIL 15-90.314
15 CAPSULE ORAL DAILY
Qty: 90 CAPSULE | Refills: 3 | Status: SHIPPED | OUTPATIENT
Start: 2025-05-22

## 2025-05-22 RX ORDER — LEVOTHYROXINE SODIUM 50 MCG
TABLET ORAL
Qty: 114 TABLET | Refills: 3 | Status: SHIPPED | OUTPATIENT
Start: 2025-05-22

## 2025-05-22 RX ORDER — FAMOTIDINE 40 MG/1
40 TABLET, FILM COATED ORAL
COMMUNITY
Start: 2025-05-18

## 2025-05-22 RX ORDER — LEVOTHYROXINE SODIUM 50 MCG
50 TABLET ORAL
Qty: 28 TABLET | Refills: 0 | Status: SHIPPED | COMMUNITY
Start: 2025-05-22

## 2025-05-22 RX ORDER — VILAZODONE HYDROCHLORIDE 20 MG/1
20 TABLET ORAL DAILY
Qty: 90 TABLET | Refills: 3 | Status: SHIPPED | OUTPATIENT
Start: 2025-05-22

## 2025-05-22 RX ORDER — LUBIPROSTONE 24 UG/1
24 CAPSULE ORAL
COMMUNITY
Start: 2025-05-17 | End: 2025-05-22 | Stop reason: ALTCHOICE

## 2025-05-22 RX ORDER — B.COAGUL,SUBTILIS/INULIN/VIT C 1B CELL-1G
1 TABLET,CHEWABLE ORAL 2 TIMES DAILY
COMMUNITY

## 2025-05-22 NOTE — PATIENT INSTRUCTIONS
"POSTPARTUM PHONE CALL ASSESSMENT    Date of Delivery: 2024  Delivering Provider: Dr. Eckert  Mode:   Delivery Notes/Complications:  Pre-eclampsia without severe features.  Enrolled in BP monitoring program.    Do you still have bleeding/pain? Yes, light bleeding and moderate pain at incision site.  Relieved by Ibuprofen \"until it wears off\".    Regular BMs/Urination? Yes  Breastfeeding/Formula/Both? Bottle feeding  How are you doing emotionally? Patient states she is doing well.     Do you have any other questions or concerns for us or your provider? No     Have you scheduled the pediatrician appointment with pediatrician? Yes    Do you have a postpartum visit scheduled? Yes   Date scheduled: 2024 for inc check and BP check Provider:  Dr. Eckert   " Increase Synthroid to 100 mcg (2 x 50 mcg tablets) x 2 days/week (need to be spaced out as evenly as possible) + 50 mcg x 5 days/week  Encouraged to realize her own limits and try not to push herself past them on a consistent basis  Praised her success in reversing prediabetes  Urged to start an exercise/balance/toning routine @ [facility]  Recommend reducing/avoidance of use with anti-inflammatories (Advil, Ibuprofen, Aleve, Naproxen) due to chronic kidney disease - encouraged switching to Tylenol if possible or at minimum Advil Dual Action instead  Reminded to stay well hydrated with plenty of water  Continue chronic medications as prescribed

## 2025-06-08 NOTE — PROGRESS NOTES
[facility] CARDIOLOGY  [address] [place] [address], [address]  [place] [address]  PHONE: [phone]        NAME:  Anna Lincoln  : 1950  MRN: [id number]     PCP:  Agatha Lundberg PA-C      SUBJECTIVE:   Anna Lincoln is a 75 y.o. female seen for a follow up visit regarding the following:     Chief Complaint   Patient presents with    Coronary Artery Disease     HPI:    She presented recently to -[facility], last seen in the catheterization lab 2019 in the setting of recurrent substernal chest discomfort despite a negative nuclear stress test.    Cardiac cath 2019:  1.  Minimal coronary disease as described above.  2.  Noncardiac chest symptoms most likely.  3.  Normal left ventricular regional wall motion and ejection fraction.  4.  Elevated left ventricular end-diastolic pressure.    Under lots of situational stress recently with recurrent SSCP associated with severely stressful situations.  Having recurrent CP, SOB, DAVIS, and acute diaphoresis with low level stress.   Exam and ECG benign.      Echo 2022:  Left Ventricle Normal left ventricular systolic function with a visually estimated EF of 60 - 65%. Left ventricle size is normal. Normal wall thickness. Normal wall motion. Normal diastolic function.   Left Atrium Left atrium size is normal. LA Vol Index is  27 ml/m2.   Right Ventricle Right ventricle size is normal. Normal wall thickness. RV basal diameter is 3.0 cm. RV mid diameter is 2.1 cm. Normal systolic function.   Right Atrium Right atrium size is normal.   Aortic Valve Valve structure is normal. No regurgitation. No stenosis.   Mitral Valve Valve structure is normal. Trace regurgitation. No stenosis noted.   Tricuspid Valve Valve structure is normal. Mild regurgitation. No stenosis noted. The estimated RVSP is 17 mmHg.   Pulmonic Valve Valve structure is normal. Trace regurgitation. No stenosis noted.   Aorta Normal sized annulus, sinus of

## 2025-06-10 ENCOUNTER — OFFICE VISIT (OUTPATIENT)
Age: 75
End: 2025-06-10
Payer: MEDICARE

## 2025-06-10 VITALS
WEIGHT: 133 LBS | SYSTOLIC BLOOD PRESSURE: 126 MMHG | HEART RATE: 72 BPM | DIASTOLIC BLOOD PRESSURE: 78 MMHG | BODY MASS INDEX: 22.71 KG/M2 | HEIGHT: 64 IN

## 2025-06-10 DIAGNOSIS — I25.10 MILD CORONARY ARTERY DISEASE: ICD-10-CM

## 2025-06-10 DIAGNOSIS — G45.9 TIA (TRANSIENT ISCHEMIC ATTACK): ICD-10-CM

## 2025-06-10 DIAGNOSIS — E78.2 MIXED HYPERLIPIDEMIA: ICD-10-CM

## 2025-06-10 DIAGNOSIS — R25.1 TREMOR: Primary | ICD-10-CM

## 2025-06-10 DIAGNOSIS — R07.89 ATYPICAL CHEST PAIN: ICD-10-CM

## 2025-06-10 DIAGNOSIS — K21.9 GASTROESOPHAGEAL REFLUX DISEASE WITHOUT ESOPHAGITIS: ICD-10-CM

## 2025-06-10 PROCEDURE — 1123F ACP DISCUSS/DSCN MKR DOCD: CPT | Performed by: INTERNAL MEDICINE

## 2025-06-10 PROCEDURE — 1036F TOBACCO NON-USER: CPT | Performed by: INTERNAL MEDICINE

## 2025-06-10 PROCEDURE — 1159F MED LIST DOCD IN RCRD: CPT | Performed by: INTERNAL MEDICINE

## 2025-06-10 PROCEDURE — 1160F RVW MEDS BY RX/DR IN RCRD: CPT | Performed by: INTERNAL MEDICINE

## 2025-06-10 PROCEDURE — G8427 DOCREV CUR MEDS BY ELIG CLIN: HCPCS | Performed by: INTERNAL MEDICINE

## 2025-06-10 PROCEDURE — G8420 CALC BMI NORM PARAMETERS: HCPCS | Performed by: INTERNAL MEDICINE

## 2025-06-10 PROCEDURE — G8399 PT W/DXA RESULTS DOCUMENT: HCPCS | Performed by: INTERNAL MEDICINE

## 2025-06-10 PROCEDURE — 99214 OFFICE O/P EST MOD 30 MIN: CPT | Performed by: INTERNAL MEDICINE

## 2025-06-10 PROCEDURE — 1090F PRES/ABSN URINE INCON ASSESS: CPT | Performed by: INTERNAL MEDICINE

## 2025-06-10 PROCEDURE — 93000 ELECTROCARDIOGRAM COMPLETE: CPT | Performed by: INTERNAL MEDICINE

## 2025-08-11 ENCOUNTER — LAB (OUTPATIENT)
Dept: INTERNAL MEDICINE CLINIC | Facility: CLINIC | Age: 75
End: 2025-08-11

## 2025-08-11 DIAGNOSIS — I25.10 MILD CORONARY ARTERY DISEASE: ICD-10-CM

## 2025-08-11 DIAGNOSIS — E78.2 MIXED HYPERLIPIDEMIA: ICD-10-CM

## 2025-08-11 DIAGNOSIS — E03.9 ACQUIRED HYPOTHYROIDISM: ICD-10-CM

## 2025-08-11 DIAGNOSIS — R73.01 ELEVATED FASTING GLUCOSE: ICD-10-CM

## 2025-08-11 DIAGNOSIS — N18.31 STAGE 3A CHRONIC KIDNEY DISEASE (HCC): ICD-10-CM

## 2025-08-11 DIAGNOSIS — I65.23 CAROTID ATHEROSCLEROSIS, BILATERAL: ICD-10-CM

## 2025-08-11 DIAGNOSIS — D69.6 THROMBOCYTOPENIA: ICD-10-CM

## 2025-08-11 LAB
ALBUMIN SERPL-MCNC: 3.7 G/DL (ref 3.2–4.6)
ALBUMIN/GLOB SERPL: 1.4 (ref 1–1.9)
ALP SERPL-CCNC: 72 U/L (ref 35–104)
ALT SERPL-CCNC: 19 U/L (ref 8–45)
ANION GAP SERPL CALC-SCNC: 11 MMOL/L (ref 7–16)
AST SERPL-CCNC: 21 U/L (ref 15–37)
BASOPHILS # BLD: 0.07 K/UL (ref 0–0.2)
BASOPHILS NFR BLD: 1.1 % (ref 0–2)
BILIRUB SERPL-MCNC: 1.2 MG/DL (ref 0–1.2)
BUN SERPL-MCNC: 19 MG/DL (ref 8–23)
CALCIUM SERPL-MCNC: 9 MG/DL (ref 8.8–10.2)
CHLORIDE SERPL-SCNC: 110 MMOL/L (ref 98–107)
CHOLEST SERPL-MCNC: 129 MG/DL (ref 0–200)
CO2 SERPL-SCNC: 23 MMOL/L (ref 20–29)
CREAT SERPL-MCNC: 1.05 MG/DL (ref 0.6–1.1)
DIFFERENTIAL METHOD BLD: ABNORMAL
EOSINOPHIL # BLD: 0.08 K/UL (ref 0–0.8)
EOSINOPHIL NFR BLD: 1.3 % (ref 0.5–7.8)
ERYTHROCYTE [DISTWIDTH] IN BLOOD BY AUTOMATED COUNT: 14.9 % (ref 11.9–14.6)
EST. AVERAGE GLUCOSE BLD GHB EST-MCNC: 117 MG/DL
GLOBULIN SER CALC-MCNC: 2.6 G/DL (ref 2.3–3.5)
GLUCOSE SERPL-MCNC: 103 MG/DL (ref 70–99)
HBA1C MFR BLD: 5.7 % (ref 0–5.6)
HCT VFR BLD AUTO: 36.1 % (ref 35.8–46.3)
HDLC SERPL-MCNC: 43 MG/DL (ref 40–60)
HDLC SERPL: 3 (ref 0–5)
HGB BLD-MCNC: 12.6 G/DL (ref 11.7–15.4)
IMM GRANULOCYTES # BLD AUTO: 0.01 K/UL (ref 0–0.5)
IMM GRANULOCYTES NFR BLD AUTO: 0.2 % (ref 0–5)
LDLC SERPL CALC-MCNC: 58 MG/DL (ref 0–100)
LYMPHOCYTES # BLD: 2.47 K/UL (ref 0.5–4.6)
LYMPHOCYTES NFR BLD: 39.9 % (ref 13–44)
MCH RBC QN AUTO: 34 PG (ref 26.1–32.9)
MCHC RBC AUTO-ENTMCNC: 34.9 G/DL (ref 31.4–35)
MCV RBC AUTO: 97.3 FL (ref 82–102)
MONOCYTES # BLD: 0.67 K/UL (ref 0.1–1.3)
MONOCYTES NFR BLD: 10.8 % (ref 4–12)
NEUTS SEG # BLD: 2.89 K/UL (ref 1.7–8.2)
NEUTS SEG NFR BLD: 46.7 % (ref 43–78)
NRBC # BLD: 0 K/UL (ref 0–0.2)
PLATELET # BLD AUTO: 133 K/UL (ref 150–450)
PMV BLD AUTO: 12.8 FL (ref 9.4–12.3)
POTASSIUM SERPL-SCNC: 4.3 MMOL/L (ref 3.5–5.1)
PROT SERPL-MCNC: 6.3 G/DL (ref 6.3–8.2)
RBC # BLD AUTO: 3.71 M/UL (ref 4.05–5.2)
SODIUM SERPL-SCNC: 144 MMOL/L (ref 136–145)
T4 FREE SERPL-MCNC: 1.2 NG/DL (ref 0.9–1.7)
TRIGL SERPL-MCNC: 139 MG/DL (ref 0–150)
TSH, 3RD GENERATION: 0.27 UIU/ML (ref 0.27–4.2)
VLDLC SERPL CALC-MCNC: 28 MG/DL (ref 6–23)
WBC # BLD AUTO: 6.2 K/UL (ref 4.3–11.1)

## 2025-08-13 ENCOUNTER — OFFICE VISIT (OUTPATIENT)
Dept: UROLOGY | Age: 75
End: 2025-08-13
Payer: MEDICARE

## 2025-08-13 DIAGNOSIS — N39.0 RECURRENT UTI: Primary | ICD-10-CM

## 2025-08-13 DIAGNOSIS — R39.198 OTHER DIFFICULTIES WITH MICTURITION: ICD-10-CM

## 2025-08-13 LAB
BILIRUBIN, URINE, POC: NEGATIVE
BLOOD URINE, POC: NEGATIVE
GLUCOSE URINE, POC: NEGATIVE MG/DL
KETONES, URINE, POC: NEGATIVE MG/DL
LEUKOCYTE ESTERASE, URINE, POC: NEGATIVE
NITRITE, URINE, POC: NEGATIVE
PH, URINE, POC: 6.5 (ref 4.6–8)
PROTEIN,URINE, POC: NEGATIVE MG/DL
PVR, POC: 164 CC
SPECIFIC GRAVITY, URINE, POC: 1 (ref 1–1.03)
URINALYSIS CLARITY, POC: NORMAL
URINALYSIS COLOR, POC: NORMAL
UROBILINOGEN, POC: NORMAL MG/DL

## 2025-08-13 PROCEDURE — 99214 OFFICE O/P EST MOD 30 MIN: CPT | Performed by: PHYSICIAN ASSISTANT

## 2025-08-13 PROCEDURE — 51798 US URINE CAPACITY MEASURE: CPT | Performed by: PHYSICIAN ASSISTANT

## 2025-08-13 PROCEDURE — 1036F TOBACCO NON-USER: CPT | Performed by: PHYSICIAN ASSISTANT

## 2025-08-13 PROCEDURE — G8399 PT W/DXA RESULTS DOCUMENT: HCPCS | Performed by: PHYSICIAN ASSISTANT

## 2025-08-13 PROCEDURE — 1159F MED LIST DOCD IN RCRD: CPT | Performed by: PHYSICIAN ASSISTANT

## 2025-08-13 PROCEDURE — G8420 CALC BMI NORM PARAMETERS: HCPCS | Performed by: PHYSICIAN ASSISTANT

## 2025-08-13 PROCEDURE — 1160F RVW MEDS BY RX/DR IN RCRD: CPT | Performed by: PHYSICIAN ASSISTANT

## 2025-08-13 PROCEDURE — 1090F PRES/ABSN URINE INCON ASSESS: CPT | Performed by: PHYSICIAN ASSISTANT

## 2025-08-13 PROCEDURE — 81003 URINALYSIS AUTO W/O SCOPE: CPT | Performed by: PHYSICIAN ASSISTANT

## 2025-08-13 PROCEDURE — 1123F ACP DISCUSS/DSCN MKR DOCD: CPT | Performed by: PHYSICIAN ASSISTANT

## 2025-08-13 PROCEDURE — G8427 DOCREV CUR MEDS BY ELIG CLIN: HCPCS | Performed by: PHYSICIAN ASSISTANT

## 2025-08-13 RX ORDER — ESTRADIOL 0.1 MG/G
1 CREAM VAGINAL
Qty: 42.5 G | Refills: 3 | Status: SHIPPED | OUTPATIENT
Start: 2025-08-13

## 2025-08-14 PROBLEM — H43.811 POSTERIOR VITREOUS DETACHMENT OF RIGHT EYE: Status: ACTIVE | Noted: 2025-07-09

## 2025-08-14 PROBLEM — H04.19 DISORDER OF LACRIMAL GLAND: Status: ACTIVE | Noted: 2023-02-16

## 2025-08-14 PROBLEM — Z86.73 HISTORY OF TRANSIENT ISCHEMIC ATTACK: Status: ACTIVE | Noted: 2025-07-09

## 2025-08-14 PROBLEM — H43.812 POSTERIOR VITREOUS DETACHMENT OF LEFT EYE: Status: ACTIVE | Noted: 2025-07-09

## 2025-08-14 PROBLEM — H40.003 OPEN-ANGLE GLAUCOMA SUSPECT OF BOTH EYES: Status: ACTIVE | Noted: 2023-02-16

## 2025-08-14 PROBLEM — H40.059 OCULAR HYPERTENSION: Status: ACTIVE | Noted: 2023-02-16

## 2025-08-14 RX ORDER — LANSOPRAZOLE 30 MG/1
CAPSULE, DELAYED RELEASE ORAL
COMMUNITY
Start: 2025-06-21 | End: 2025-08-18 | Stop reason: ALTCHOICE

## 2025-08-14 RX ORDER — VONOPRAZAN FUMARATE 26.72 MG/1
1 TABLET ORAL DAILY
Qty: 30 TABLET | Refills: 5 | Status: CANCELLED | OUTPATIENT
Start: 2025-08-14

## 2025-08-15 LAB
BACTERIA SPEC CULT: NORMAL
SERVICE CMNT-IMP: NORMAL

## 2025-08-18 ENCOUNTER — OFFICE VISIT (OUTPATIENT)
Dept: INTERNAL MEDICINE CLINIC | Facility: CLINIC | Age: 75
End: 2025-08-18
Payer: MEDICARE

## 2025-08-18 ENCOUNTER — RESULTS FOLLOW-UP (OUTPATIENT)
Dept: UROLOGY | Age: 75
End: 2025-08-18

## 2025-08-18 ENCOUNTER — TELEPHONE (OUTPATIENT)
Dept: UROLOGY | Age: 75
End: 2025-08-18

## 2025-08-18 VITALS
TEMPERATURE: 97.7 F | WEIGHT: 133.4 LBS | BODY MASS INDEX: 24.55 KG/M2 | DIASTOLIC BLOOD PRESSURE: 62 MMHG | HEIGHT: 62 IN | OXYGEN SATURATION: 94 % | SYSTOLIC BLOOD PRESSURE: 102 MMHG | HEART RATE: 79 BPM

## 2025-08-18 DIAGNOSIS — F33.1 MODERATE RECURRENT MAJOR DEPRESSION (HCC): ICD-10-CM

## 2025-08-18 DIAGNOSIS — I25.10 MILD CORONARY ARTERY DISEASE: ICD-10-CM

## 2025-08-18 DIAGNOSIS — Z12.31 SCREENING MAMMOGRAM FOR BREAST CANCER: ICD-10-CM

## 2025-08-18 DIAGNOSIS — F43.9 SITUATIONAL STRESS: Primary | ICD-10-CM

## 2025-08-18 DIAGNOSIS — Z00.00 MEDICARE ANNUAL WELLNESS VISIT, SUBSEQUENT: ICD-10-CM

## 2025-08-18 DIAGNOSIS — N39.0 RECURRENT UTI: ICD-10-CM

## 2025-08-18 DIAGNOSIS — R73.01 ELEVATED FASTING GLUCOSE: ICD-10-CM

## 2025-08-18 DIAGNOSIS — F51.04 CHRONIC INSOMNIA: ICD-10-CM

## 2025-08-18 DIAGNOSIS — E78.2 MIXED HYPERLIPIDEMIA: ICD-10-CM

## 2025-08-18 DIAGNOSIS — Z29.11 NEED FOR RSV IMMUNIZATION: ICD-10-CM

## 2025-08-18 DIAGNOSIS — N18.31 STAGE 3A CHRONIC KIDNEY DISEASE (HCC): ICD-10-CM

## 2025-08-18 DIAGNOSIS — E03.9 ACQUIRED HYPOTHYROIDISM: ICD-10-CM

## 2025-08-18 DIAGNOSIS — D69.6 THROMBOCYTOPENIA: ICD-10-CM

## 2025-08-18 PROCEDURE — 1159F MED LIST DOCD IN RCRD: CPT | Performed by: PHYSICIAN ASSISTANT

## 2025-08-18 PROCEDURE — G0439 PPPS, SUBSEQ VISIT: HCPCS | Performed by: PHYSICIAN ASSISTANT

## 2025-08-18 PROCEDURE — 1123F ACP DISCUSS/DSCN MKR DOCD: CPT | Performed by: PHYSICIAN ASSISTANT

## 2025-08-18 PROCEDURE — 1090F PRES/ABSN URINE INCON ASSESS: CPT | Performed by: PHYSICIAN ASSISTANT

## 2025-08-18 PROCEDURE — G2211 COMPLEX E/M VISIT ADD ON: HCPCS | Performed by: PHYSICIAN ASSISTANT

## 2025-08-18 PROCEDURE — 1036F TOBACCO NON-USER: CPT | Performed by: PHYSICIAN ASSISTANT

## 2025-08-18 PROCEDURE — G8427 DOCREV CUR MEDS BY ELIG CLIN: HCPCS | Performed by: PHYSICIAN ASSISTANT

## 2025-08-18 PROCEDURE — 99214 OFFICE O/P EST MOD 30 MIN: CPT | Performed by: PHYSICIAN ASSISTANT

## 2025-08-18 PROCEDURE — G8420 CALC BMI NORM PARAMETERS: HCPCS | Performed by: PHYSICIAN ASSISTANT

## 2025-08-18 PROCEDURE — G8399 PT W/DXA RESULTS DOCUMENT: HCPCS | Performed by: PHYSICIAN ASSISTANT

## 2025-08-18 RX ORDER — CLONAZEPAM 1 MG/1
TABLET ORAL
Qty: 75 TABLET | Refills: 5 | Status: SHIPPED | OUTPATIENT
Start: 2025-09-03 | End: 2025-12-24

## 2025-08-18 RX ORDER — RESPIRATORY SYNCYTIAL VIRUS VACCINE 120MCG/0.5
0.5 KIT INTRAMUSCULAR ONCE
Qty: 0.5 ML | Refills: 0 | Status: SHIPPED | OUTPATIENT
Start: 2025-08-18 | End: 2025-08-18

## 2025-08-18 RX ORDER — CEPHALEXIN 500 MG/1
500 CAPSULE ORAL 2 TIMES DAILY
COMMUNITY
Start: 2025-08-09

## 2025-08-18 ASSESSMENT — PATIENT HEALTH QUESTIONNAIRE - PHQ9
5. POOR APPETITE OR OVEREATING: NOT AT ALL
SUM OF ALL RESPONSES TO PHQ QUESTIONS 1-9: 3
SUM OF ALL RESPONSES TO PHQ QUESTIONS 1-9: 3
9. THOUGHTS THAT YOU WOULD BE BETTER OFF DEAD, OR OF HURTING YOURSELF: NOT AT ALL
SUM OF ALL RESPONSES TO PHQ QUESTIONS 1-9: 0
10. IF YOU CHECKED OFF ANY PROBLEMS, HOW DIFFICULT HAVE THESE PROBLEMS MADE IT FOR YOU TO DO YOUR WORK, TAKE CARE OF THINGS AT HOME, OR GET ALONG WITH OTHER PEOPLE: NOT DIFFICULT AT ALL
4. FEELING TIRED OR HAVING LITTLE ENERGY: NEARLY EVERY DAY
7. TROUBLE CONCENTRATING ON THINGS, SUCH AS READING THE NEWSPAPER OR WATCHING TELEVISION: NOT AT ALL
1. LITTLE INTEREST OR PLEASURE IN DOING THINGS: NOT AT ALL
SUM OF ALL RESPONSES TO PHQ QUESTIONS 1-9: 3
SUM OF ALL RESPONSES TO PHQ QUESTIONS 1-9: 0
2. FEELING DOWN, DEPRESSED OR HOPELESS: NOT AT ALL
SUM OF ALL RESPONSES TO PHQ QUESTIONS 1-9: 3
8. MOVING OR SPEAKING SO SLOWLY THAT OTHER PEOPLE COULD HAVE NOTICED. OR THE OPPOSITE, BEING SO FIGETY OR RESTLESS THAT YOU HAVE BEEN MOVING AROUND A LOT MORE THAN USUAL: NOT AT ALL
SUM OF ALL RESPONSES TO PHQ QUESTIONS 1-9: 0
3. TROUBLE FALLING OR STAYING ASLEEP: NOT AT ALL
1. LITTLE INTEREST OR PLEASURE IN DOING THINGS: NOT AT ALL
6. FEELING BAD ABOUT YOURSELF - OR THAT YOU ARE A FAILURE OR HAVE LET YOURSELF OR YOUR FAMILY DOWN: NOT AT ALL
2. FEELING DOWN, DEPRESSED OR HOPELESS: NOT AT ALL
SUM OF ALL RESPONSES TO PHQ QUESTIONS 1-9: 0

## 2025-08-18 ASSESSMENT — ENCOUNTER SYMPTOMS
BLOOD IN STOOL: 0
NAUSEA: 0
ABDOMINAL PAIN: 1
VOMITING: 0
ABDOMINAL DISTENTION: 1

## 2025-08-18 ASSESSMENT — LIFESTYLE VARIABLES
HOW MANY STANDARD DRINKS CONTAINING ALCOHOL DO YOU HAVE ON A TYPICAL DAY: 1 OR 2
HOW OFTEN DO YOU HAVE A DRINK CONTAINING ALCOHOL: 2-3 TIMES A WEEK

## 2025-08-21 ENCOUNTER — CLINICAL SUPPORT (OUTPATIENT)
Age: 75
End: 2025-08-21
Payer: MEDICARE

## 2025-08-21 ENCOUNTER — OFFICE VISIT (OUTPATIENT)
Dept: UROLOGY | Age: 75
End: 2025-08-21
Payer: MEDICARE

## 2025-08-21 VITALS
OXYGEN SATURATION: 94 % | DIASTOLIC BLOOD PRESSURE: 65 MMHG | RESPIRATION RATE: 20 BRPM | HEART RATE: 71 BPM | TEMPERATURE: 98.1 F | SYSTOLIC BLOOD PRESSURE: 106 MMHG

## 2025-08-21 DIAGNOSIS — M81.0 AGE RELATED OSTEOPOROSIS, UNSPECIFIED PATHOLOGICAL FRACTURE PRESENCE: Primary | ICD-10-CM

## 2025-08-21 DIAGNOSIS — N30.10 INTERSTITIAL CYSTITIS: ICD-10-CM

## 2025-08-21 DIAGNOSIS — R30.0 DYSURIA: Primary | ICD-10-CM

## 2025-08-21 LAB
BILIRUBIN, URINE, POC: NEGATIVE
BLOOD URINE, POC: NEGATIVE
GLUCOSE URINE, POC: NEGATIVE MG/DL
KETONES, URINE, POC: NEGATIVE MG/DL
LEUKOCYTE ESTERASE, URINE, POC: NORMAL
NITRITE, URINE, POC: NEGATIVE
PH, URINE, POC: 7 (ref 4.6–8)
PROTEIN,URINE, POC: NEGATIVE MG/DL
PVR, POC: 55 CC
SPECIFIC GRAVITY, URINE, POC: 1.01 (ref 1–1.03)
URINALYSIS CLARITY, POC: NORMAL
URINALYSIS COLOR, POC: NORMAL
UROBILINOGEN, POC: NORMAL MG/DL

## 2025-08-21 PROCEDURE — 1159F MED LIST DOCD IN RCRD: CPT | Performed by: PHYSICIAN ASSISTANT

## 2025-08-21 PROCEDURE — 96372 THER/PROPH/DIAG INJ SC/IM: CPT | Performed by: PSYCHIATRY & NEUROLOGY

## 2025-08-21 PROCEDURE — 51798 US URINE CAPACITY MEASURE: CPT | Performed by: PHYSICIAN ASSISTANT

## 2025-08-21 PROCEDURE — 1036F TOBACCO NON-USER: CPT | Performed by: PHYSICIAN ASSISTANT

## 2025-08-21 PROCEDURE — G8399 PT W/DXA RESULTS DOCUMENT: HCPCS | Performed by: PHYSICIAN ASSISTANT

## 2025-08-21 PROCEDURE — 81003 URINALYSIS AUTO W/O SCOPE: CPT | Performed by: PHYSICIAN ASSISTANT

## 2025-08-21 PROCEDURE — 1123F ACP DISCUSS/DSCN MKR DOCD: CPT | Performed by: PHYSICIAN ASSISTANT

## 2025-08-21 PROCEDURE — G8427 DOCREV CUR MEDS BY ELIG CLIN: HCPCS | Performed by: PHYSICIAN ASSISTANT

## 2025-08-21 PROCEDURE — 99214 OFFICE O/P EST MOD 30 MIN: CPT | Performed by: PHYSICIAN ASSISTANT

## 2025-08-21 PROCEDURE — 1090F PRES/ABSN URINE INCON ASSESS: CPT | Performed by: PHYSICIAN ASSISTANT

## 2025-08-21 PROCEDURE — G8420 CALC BMI NORM PARAMETERS: HCPCS | Performed by: PHYSICIAN ASSISTANT

## 2025-08-21 PROCEDURE — 1160F RVW MEDS BY RX/DR IN RCRD: CPT | Performed by: PHYSICIAN ASSISTANT

## 2025-08-21 RX ORDER — HYDROCORTISONE SODIUM SUCCINATE 100 MG/2ML
100 INJECTION INTRAMUSCULAR; INTRAVENOUS
OUTPATIENT
Start: 2026-02-15

## 2025-08-21 RX ORDER — SODIUM CHLORIDE 9 MG/ML
INJECTION, SOLUTION INTRAVENOUS CONTINUOUS
OUTPATIENT
Start: 2026-02-15

## 2025-08-21 RX ORDER — EPINEPHRINE 1 MG/ML
0.3 INJECTION, SOLUTION, CONCENTRATE INTRAVENOUS PRN
OUTPATIENT
Start: 2026-02-15

## 2025-08-21 RX ORDER — HYDROXYZINE PAMOATE 25 MG/1
25 CAPSULE ORAL
Qty: 30 CAPSULE | Refills: 3 | Status: SHIPPED | OUTPATIENT
Start: 2025-08-21

## 2025-08-21 RX ORDER — DIPHENHYDRAMINE HYDROCHLORIDE 50 MG/ML
50 INJECTION, SOLUTION INTRAMUSCULAR; INTRAVENOUS
OUTPATIENT
Start: 2026-02-15

## 2025-08-21 RX ORDER — ONDANSETRON 2 MG/ML
8 INJECTION INTRAMUSCULAR; INTRAVENOUS
OUTPATIENT
Start: 2026-02-15

## 2025-08-21 RX ORDER — ACETAMINOPHEN 325 MG/1
650 TABLET ORAL
OUTPATIENT
Start: 2026-02-15

## 2025-08-21 RX ORDER — ALBUTEROL SULFATE 90 UG/1
4 INHALANT RESPIRATORY (INHALATION) PRN
OUTPATIENT
Start: 2026-02-15

## 2025-08-21 RX ORDER — AMITRIPTYLINE HYDROCHLORIDE 10 MG/1
10 TABLET ORAL NIGHTLY
Qty: 30 TABLET | Refills: 0 | Status: SHIPPED | OUTPATIENT
Start: 2025-08-21

## 2025-08-21 RX ORDER — PHENAZOPYRIDINE HYDROCHLORIDE 200 MG/1
200 TABLET, FILM COATED ORAL 3 TIMES DAILY PRN
Qty: 30 TABLET | Refills: 1 | Status: SHIPPED | OUTPATIENT
Start: 2025-08-21

## 2025-08-24 LAB
BACTERIA SPEC CULT: ABNORMAL
BACTERIA SPEC CULT: ABNORMAL
SERVICE CMNT-IMP: ABNORMAL

## (undated) DEVICE — DISPOSABLE TOURNIQUET CUFF SINGLE BLADDER, DUAL PORT AND QUICK CONNECT CONNECTOR: Brand: COLOR CUFF

## (undated) DEVICE — STERILE HOOK LOCK LATEX FREE ELASTIC BANDAGE 6INX5YD: Brand: HOOK LOCK™

## (undated) DEVICE — PADDING CAST W4INXL4YD ST COT COHESIVE HND TEARABLE SPEC

## (undated) DEVICE — NON-ADHERING DRESSING: Brand: ADAPTIC®

## (undated) DEVICE — BUTTON SWITCH PENCIL BLADE ELECTRODE, HOLSTER: Brand: EDGE

## (undated) DEVICE — Device

## (undated) DEVICE — SUT ETHLN 3-0 18IN PS1 BLK --

## (undated) DEVICE — KIT INSTR W/ 2.4MM GUIDEPIN SUT PASS WIRE NO2 FIBERWIRE

## (undated) DEVICE — AMD ANTIMICROBIAL GAUZE SPONGES,12 PLY USP TYPE VII, 0.2% POLYHEXAMETHYLENE BIGUANIDE HCI (PHMB): Brand: CURITY

## (undated) DEVICE — SUTURE VCRL SZ 2-0 L27IN ABSRB UD L26MM CT-2 1/2 CIR J269H

## (undated) DEVICE — BNDG SOF-FORM 2X75 STRL LF --

## (undated) DEVICE — SPLINT CAST W4XL30IN WHT THMB FBRGLS PRECUT INTLOK WRINKLE

## (undated) DEVICE — REM POLYHESIVE ADULT PATIENT RETURN ELECTRODE: Brand: VALLEYLAB

## (undated) DEVICE — AMD ANTIMICROBIAL BANDAGE ROLL,6 PLY: Brand: KERLIX

## (undated) DEVICE — LARGE TEAR CROSS CUT RASP (14.0 X 7.0MM)

## (undated) DEVICE — STERILE HOOK LOCK LATEX FREE ELASTIC BANDAGE 4INX5YD: Brand: HOOK LOCK™

## (undated) DEVICE — PAD,ABDOMINAL,5"X9",STERILE,LF,1/PK: Brand: MEDLINE INDUSTRIES, INC.

## (undated) DEVICE — SUTURE MCRYL SZ 3-0 L27IN ABSRB UD L19MM PS-2 3/8 CIR PRIM Y427H